# Patient Record
Sex: MALE | Race: WHITE | Employment: OTHER | URBAN - NONMETROPOLITAN AREA
[De-identification: names, ages, dates, MRNs, and addresses within clinical notes are randomized per-mention and may not be internally consistent; named-entity substitution may affect disease eponyms.]

---

## 2017-06-23 ENCOUNTER — OFFICE VISIT (OUTPATIENT)
Dept: CARDIOLOGY | Age: 62
End: 2017-06-23
Payer: COMMERCIAL

## 2017-06-23 VITALS
BODY MASS INDEX: 39.27 KG/M2 | SYSTOLIC BLOOD PRESSURE: 128 MMHG | DIASTOLIC BLOOD PRESSURE: 78 MMHG | WEIGHT: 306 LBS | HEART RATE: 70 BPM | HEIGHT: 74 IN

## 2017-06-23 DIAGNOSIS — I10 ESSENTIAL HYPERTENSION: ICD-10-CM

## 2017-06-23 DIAGNOSIS — I48.0 PAROXYSMAL ATRIAL FIBRILLATION (HCC): Primary | ICD-10-CM

## 2017-06-23 PROCEDURE — 93000 ELECTROCARDIOGRAM COMPLETE: CPT | Performed by: CLINICAL NURSE SPECIALIST

## 2017-06-23 PROCEDURE — 3017F COLORECTAL CA SCREEN DOC REV: CPT | Performed by: CLINICAL NURSE SPECIALIST

## 2017-06-23 PROCEDURE — G8427 DOCREV CUR MEDS BY ELIG CLIN: HCPCS | Performed by: CLINICAL NURSE SPECIALIST

## 2017-06-23 PROCEDURE — 1036F TOBACCO NON-USER: CPT | Performed by: CLINICAL NURSE SPECIALIST

## 2017-06-23 PROCEDURE — G8417 CALC BMI ABV UP PARAM F/U: HCPCS | Performed by: CLINICAL NURSE SPECIALIST

## 2017-06-23 PROCEDURE — 99213 OFFICE O/P EST LOW 20 MIN: CPT | Performed by: CLINICAL NURSE SPECIALIST

## 2017-08-04 ENCOUNTER — HOSPITAL ENCOUNTER (OUTPATIENT)
Dept: GENERAL RADIOLOGY | Age: 62
Discharge: HOME | End: 2017-08-04
Payer: COMMERCIAL

## 2017-08-04 DIAGNOSIS — J20.9 ACUTE BRONCHITIS, UNSPECIFIED ORGANISM: ICD-10-CM

## 2017-08-04 DIAGNOSIS — J01.10 ACUTE FRONTAL SINUSITIS, RECURRENCE NOT SPECIFIED: ICD-10-CM

## 2017-08-04 PROCEDURE — 71020 XR CHEST STANDARD TWO VW: CPT

## 2017-08-18 ENCOUNTER — HOSPITAL ENCOUNTER (OUTPATIENT)
Dept: CT IMAGING | Age: 62
Discharge: HOME | End: 2017-08-18
Payer: COMMERCIAL

## 2017-08-18 DIAGNOSIS — R06.2 WHEEZING: ICD-10-CM

## 2017-08-18 DIAGNOSIS — R05.9 COUGH: ICD-10-CM

## 2017-08-18 DIAGNOSIS — R07.89 OTHER CHEST PAIN: ICD-10-CM

## 2017-08-18 LAB
GFR NON-AFRICAN AMERICAN: >60
PERFORMED ON: NORMAL
POC CREATININE: 1.1 MG/DL (ref 0.3–1.3)
POC SAMPLE TYPE: NORMAL

## 2017-08-18 PROCEDURE — 71270 CT THORAX DX C-/C+: CPT

## 2017-08-18 PROCEDURE — 82565 ASSAY OF CREATININE: CPT

## 2017-08-18 PROCEDURE — 6360000004 HC RX CONTRAST MEDICATION: Performed by: FAMILY MEDICINE

## 2017-08-18 RX ADMIN — IOVERSOL 90 ML: 741 INJECTION INTRA-ARTERIAL; INTRAVENOUS at 09:58

## 2017-08-25 ENCOUNTER — HOSPITAL ENCOUNTER (OUTPATIENT)
Dept: PULMONOLOGY | Age: 62
Discharge: HOME | DRG: 193 | End: 2017-08-25
Payer: COMMERCIAL

## 2017-08-25 PROCEDURE — 94729 DIFFUSING CAPACITY: CPT

## 2017-08-25 PROCEDURE — 94727 GAS DIL/WSHOT DETER LNG VOL: CPT

## 2017-08-25 PROCEDURE — 94060 EVALUATION OF WHEEZING: CPT

## 2017-08-25 PROCEDURE — 6360000002 HC RX W HCPCS: Performed by: FAMILY MEDICINE

## 2017-08-25 RX ORDER — ALBUTEROL SULFATE 2.5 MG/3ML
2.5 SOLUTION RESPIRATORY (INHALATION) EVERY 6 HOURS PRN
Status: DISCONTINUED | OUTPATIENT
Start: 2017-08-25 | End: 2017-08-27 | Stop reason: HOSPADM

## 2017-08-27 ENCOUNTER — APPOINTMENT (OUTPATIENT)
Dept: GENERAL RADIOLOGY | Age: 62
DRG: 193 | End: 2017-08-27
Payer: COMMERCIAL

## 2017-08-27 ENCOUNTER — HOSPITAL ENCOUNTER (INPATIENT)
Age: 62
LOS: 8 days | Discharge: HOME | DRG: 193 | End: 2017-09-04
Attending: EMERGENCY MEDICINE | Admitting: FAMILY MEDICINE
Payer: COMMERCIAL

## 2017-08-27 DIAGNOSIS — R09.02 HYPOXIA: Primary | ICD-10-CM

## 2017-08-27 DIAGNOSIS — J45.20 REACTIVE AIRWAY DISEASE, MILD INTERMITTENT, UNCOMPLICATED: ICD-10-CM

## 2017-08-27 LAB
ALBUMIN SERPL-MCNC: 4.2 G/DL (ref 3.5–5.2)
ALP BLD-CCNC: 99 U/L (ref 40–130)
ALT SERPL-CCNC: 21 U/L (ref 5–41)
ANION GAP SERPL CALCULATED.3IONS-SCNC: 17 MMOL/L (ref 7–19)
AST SERPL-CCNC: 14 U/L (ref 5–40)
BASE EXCESS ARTERIAL: 0.8 MMOL/L (ref -2–2)
BILIRUB SERPL-MCNC: 1.3 MG/DL (ref 0.2–1.2)
BUN BLDV-MCNC: 14 MG/DL (ref 8–23)
CALCIUM SERPL-MCNC: 9.9 MG/DL (ref 8.8–10.2)
CARBOXYHEMOGLOBIN ARTERIAL: 1.3 % (ref 0–5)
CHLORIDE BLD-SCNC: 99 MMOL/L (ref 98–111)
CO2: 24 MMOL/L (ref 22–29)
CREAT SERPL-MCNC: 0.7 MG/DL (ref 0.5–1.2)
GFR NON-AFRICAN AMERICAN: >60
GLUCOSE BLD-MCNC: 180 MG/DL (ref 74–109)
GLUCOSE BLD-MCNC: 389 MG/DL (ref 70–99)
HCO3 ARTERIAL: 24.3 MMOL/L (ref 22–26)
HCT VFR BLD CALC: 49.8 % (ref 42–52)
HEMOGLOBIN, ART, EXTENDED: 16 G/DL (ref 14–18)
HEMOGLOBIN: 16.1 G/DL (ref 14–18)
MCH RBC QN AUTO: 26.9 PG (ref 27–31)
MCHC RBC AUTO-ENTMCNC: 32.3 G/DL (ref 33–37)
MCV RBC AUTO: 83.1 FL (ref 80–94)
METHEMOGLOBIN ARTERIAL: 0.6 %
O2 CONTENT ARTERIAL: 20.7 ML/DL
O2 SAT, ARTERIAL: 92.3 %
O2 THERAPY: ABNORMAL
PCO2 ARTERIAL: 35 MMHG (ref 35–45)
PDW BLD-RTO: 15.2 % (ref 11.5–14.5)
PERFORMED ON: ABNORMAL
PH ARTERIAL: 7.45 (ref 7.35–7.45)
PLATELET # BLD: 166 K/UL (ref 130–400)
PMV BLD AUTO: 11.9 FL (ref 9.4–12.4)
PO2 ARTERIAL: 64 MMHG (ref 80–100)
POTASSIUM SERPL-SCNC: 4.3 MMOL/L (ref 3.5–5)
POTASSIUM, WHOLE BLOOD: 4.1
RBC # BLD: 5.99 M/UL (ref 4.7–6.1)
SODIUM BLD-SCNC: 140 MMOL/L (ref 136–145)
TOTAL PROTEIN: 7.9 G/DL (ref 6.6–8.7)
WBC # BLD: 13.8 K/UL (ref 4.8–10.8)

## 2017-08-27 PROCEDURE — 82803 BLOOD GASES ANY COMBINATION: CPT

## 2017-08-27 PROCEDURE — 6360000002 HC RX W HCPCS: Performed by: EMERGENCY MEDICINE

## 2017-08-27 PROCEDURE — 1210000000 HC MED SURG R&B

## 2017-08-27 PROCEDURE — 99284 EMERGENCY DEPT VISIT MOD MDM: CPT | Performed by: EMERGENCY MEDICINE

## 2017-08-27 PROCEDURE — 36415 COLL VENOUS BLD VENIPUNCTURE: CPT

## 2017-08-27 PROCEDURE — 36600 WITHDRAWAL OF ARTERIAL BLOOD: CPT

## 2017-08-27 PROCEDURE — 82948 REAGENT STRIP/BLOOD GLUCOSE: CPT

## 2017-08-27 PROCEDURE — 84132 ASSAY OF SERUM POTASSIUM: CPT

## 2017-08-27 PROCEDURE — 93005 ELECTROCARDIOGRAM TRACING: CPT

## 2017-08-27 PROCEDURE — 6370000000 HC RX 637 (ALT 250 FOR IP): Performed by: EMERGENCY MEDICINE

## 2017-08-27 PROCEDURE — 85027 COMPLETE CBC AUTOMATED: CPT

## 2017-08-27 PROCEDURE — 99285 EMERGENCY DEPT VISIT HI MDM: CPT

## 2017-08-27 PROCEDURE — 6370000000 HC RX 637 (ALT 250 FOR IP): Performed by: FAMILY MEDICINE

## 2017-08-27 PROCEDURE — 71020 XR CHEST STANDARD TWO VW: CPT

## 2017-08-27 PROCEDURE — 2580000003 HC RX 258: Performed by: EMERGENCY MEDICINE

## 2017-08-27 PROCEDURE — 80053 COMPREHEN METABOLIC PANEL: CPT

## 2017-08-27 PROCEDURE — 94640 AIRWAY INHALATION TREATMENT: CPT

## 2017-08-27 PROCEDURE — 96374 THER/PROPH/DIAG INJ IV PUSH: CPT

## 2017-08-27 RX ORDER — METHYLPREDNISOLONE SODIUM SUCCINATE 125 MG/2ML
80 INJECTION, POWDER, LYOPHILIZED, FOR SOLUTION INTRAMUSCULAR; INTRAVENOUS EVERY 6 HOURS
Status: DISCONTINUED | OUTPATIENT
Start: 2017-08-27 | End: 2017-08-28

## 2017-08-27 RX ORDER — IPRATROPIUM BROMIDE AND ALBUTEROL SULFATE 2.5; .5 MG/3ML; MG/3ML
1 SOLUTION RESPIRATORY (INHALATION) ONCE
Status: COMPLETED | OUTPATIENT
Start: 2017-08-27 | End: 2017-08-27

## 2017-08-27 RX ORDER — M-VIT,TX,IRON,MINS/CALC/FOLIC 27MG-0.4MG
1 TABLET ORAL DAILY
Status: DISCONTINUED | OUTPATIENT
Start: 2017-08-27 | End: 2017-09-05 | Stop reason: HOSPADM

## 2017-08-27 RX ORDER — SODIUM CHLORIDE 0.9 % (FLUSH) 0.9 %
10 SYRINGE (ML) INJECTION EVERY 12 HOURS SCHEDULED
Status: DISCONTINUED | OUTPATIENT
Start: 2017-08-27 | End: 2017-09-05 | Stop reason: HOSPADM

## 2017-08-27 RX ORDER — ACETAMINOPHEN 325 MG/1
650 TABLET ORAL EVERY 4 HOURS PRN
Status: DISCONTINUED | OUTPATIENT
Start: 2017-08-27 | End: 2017-09-05 | Stop reason: HOSPADM

## 2017-08-27 RX ORDER — NICOTINE POLACRILEX 4 MG
15 LOZENGE BUCCAL PRN
Status: DISCONTINUED | OUTPATIENT
Start: 2017-08-27 | End: 2017-09-05 | Stop reason: HOSPADM

## 2017-08-27 RX ORDER — LISINOPRIL AND HYDROCHLOROTHIAZIDE 20; 12.5 MG/1; MG/1
1 TABLET ORAL DAILY
Status: DISCONTINUED | OUTPATIENT
Start: 2017-08-27 | End: 2017-08-27 | Stop reason: CLARIF

## 2017-08-27 RX ORDER — IPRATROPIUM BROMIDE AND ALBUTEROL SULFATE 2.5; .5 MG/3ML; MG/3ML
1 SOLUTION RESPIRATORY (INHALATION)
Status: DISCONTINUED | OUTPATIENT
Start: 2017-08-27 | End: 2017-08-27

## 2017-08-27 RX ORDER — ATORVASTATIN CALCIUM 40 MG/1
40 TABLET, FILM COATED ORAL DAILY
Status: DISCONTINUED | OUTPATIENT
Start: 2017-08-27 | End: 2017-09-05 | Stop reason: HOSPADM

## 2017-08-27 RX ORDER — ASPIRIN 81 MG/1
81 TABLET ORAL DAILY
Status: DISCONTINUED | OUTPATIENT
Start: 2017-08-27 | End: 2017-09-05 | Stop reason: HOSPADM

## 2017-08-27 RX ORDER — METHYLPREDNISOLONE SODIUM SUCCINATE 125 MG/2ML
125 INJECTION, POWDER, LYOPHILIZED, FOR SOLUTION INTRAMUSCULAR; INTRAVENOUS ONCE
Status: COMPLETED | OUTPATIENT
Start: 2017-08-27 | End: 2017-08-27

## 2017-08-27 RX ORDER — PANTOPRAZOLE SODIUM 40 MG/1
40 TABLET, DELAYED RELEASE ORAL
Status: DISCONTINUED | OUTPATIENT
Start: 2017-08-28 | End: 2017-09-05 | Stop reason: HOSPADM

## 2017-08-27 RX ORDER — HYDROCHLOROTHIAZIDE 12.5 MG/1
12.5 CAPSULE, GELATIN COATED ORAL DAILY
Status: DISCONTINUED | OUTPATIENT
Start: 2017-08-27 | End: 2017-08-28

## 2017-08-27 RX ORDER — LISINOPRIL 20 MG/1
20 TABLET ORAL DAILY
Status: DISCONTINUED | OUTPATIENT
Start: 2017-08-27 | End: 2017-08-28

## 2017-08-27 RX ORDER — PROPAFENONE HYDROCHLORIDE 150 MG/1
300 TABLET, FILM COATED ORAL 3 TIMES DAILY
Status: DISCONTINUED | OUTPATIENT
Start: 2017-08-27 | End: 2017-09-05 | Stop reason: HOSPADM

## 2017-08-27 RX ORDER — DEXTROSE MONOHYDRATE 50 MG/ML
100 INJECTION, SOLUTION INTRAVENOUS PRN
Status: DISCONTINUED | OUTPATIENT
Start: 2017-08-27 | End: 2017-09-05 | Stop reason: HOSPADM

## 2017-08-27 RX ORDER — ALBUTEROL SULFATE 2.5 MG/3ML
2.5 SOLUTION RESPIRATORY (INHALATION) EVERY 4 HOURS PRN
Status: DISCONTINUED | OUTPATIENT
Start: 2017-08-27 | End: 2017-08-28

## 2017-08-27 RX ORDER — DEXTROSE MONOHYDRATE 25 G/50ML
12.5 INJECTION, SOLUTION INTRAVENOUS PRN
Status: DISCONTINUED | OUTPATIENT
Start: 2017-08-27 | End: 2017-09-05 | Stop reason: HOSPADM

## 2017-08-27 RX ORDER — SODIUM CHLORIDE 0.9 % (FLUSH) 0.9 %
10 SYRINGE (ML) INJECTION PRN
Status: DISCONTINUED | OUTPATIENT
Start: 2017-08-27 | End: 2017-09-05 | Stop reason: HOSPADM

## 2017-08-27 RX ADMIN — IPRATROPIUM BROMIDE AND ALBUTEROL SULFATE 1 AMPULE: .5; 3 SOLUTION RESPIRATORY (INHALATION) at 15:06

## 2017-08-27 RX ADMIN — INSULIN LISPRO 5 UNITS: 100 INJECTION, SOLUTION INTRAVENOUS; SUBCUTANEOUS at 22:04

## 2017-08-27 RX ADMIN — ASPIRIN 81 MG: 81 TABLET, COATED ORAL at 20:08

## 2017-08-27 RX ADMIN — METFORMIN HYDROCHLORIDE 500 MG: 500 TABLET, FILM COATED ORAL at 20:08

## 2017-08-27 RX ADMIN — ATORVASTATIN CALCIUM 40 MG: 40 TABLET, FILM COATED ORAL at 20:08

## 2017-08-27 RX ADMIN — METHYLPREDNISOLONE SODIUM SUCCINATE 80 MG: 125 INJECTION, POWDER, FOR SOLUTION INTRAMUSCULAR; INTRAVENOUS at 20:09

## 2017-08-27 RX ADMIN — METHYLPREDNISOLONE SODIUM SUCCINATE 125 MG: 125 INJECTION, POWDER, FOR SOLUTION INTRAMUSCULAR; INTRAVENOUS at 15:45

## 2017-08-27 RX ADMIN — ENOXAPARIN SODIUM 40 MG: 40 INJECTION SUBCUTANEOUS at 20:08

## 2017-08-27 RX ADMIN — ACETAMINOPHEN 650 MG: 325 TABLET, FILM COATED ORAL at 20:17

## 2017-08-27 RX ADMIN — PROPAFENONE HYDROCHLORIDE 300 MG: 150 TABLET, FILM COATED ORAL at 20:08

## 2017-08-27 RX ADMIN — Medication 10 ML: at 20:08

## 2017-08-27 ASSESSMENT — PAIN SCALES - GENERAL
PAINLEVEL_OUTOF10: 5
PAINLEVEL_OUTOF10: 2
PAINLEVEL_OUTOF10: 2

## 2017-08-27 ASSESSMENT — ENCOUNTER SYMPTOMS
EYE PAIN: 0
APNEA: 0
ALLERGIC/IMMUNOLOGIC NEGATIVE: 1
CHEST TIGHTNESS: 0
EYE DISCHARGE: 0
BACK PAIN: 0
CONSTIPATION: 0
CHOKING: 0
GASTROINTESTINAL NEGATIVE: 1
RESPIRATORY NEGATIVE: 1
EYE REDNESS: 0
EYES NEGATIVE: 1
NAUSEA: 0
COUGH: 0
EYE ITCHING: 0
ABDOMINAL DISTENTION: 0

## 2017-08-28 ENCOUNTER — APPOINTMENT (OUTPATIENT)
Dept: CT IMAGING | Age: 62
DRG: 193 | End: 2017-08-28
Payer: COMMERCIAL

## 2017-08-28 PROBLEM — J96.01 ACUTE RESPIRATORY FAILURE WITH HYPOXIA (HCC): Status: ACTIVE | Noted: 2017-08-28

## 2017-08-28 PROBLEM — R09.02 HYPOXIA: Status: ACTIVE | Noted: 2017-08-28

## 2017-08-28 LAB
BILIRUBIN URINE: NEGATIVE
BLOOD, URINE: NEGATIVE
CLARITY: CLEAR
COLOR: YELLOW
D DIMER: 15.19 NG/ML DDU (ref 0–0.48)
GLUCOSE BLD-MCNC: 275 MG/DL (ref 70–99)
GLUCOSE BLD-MCNC: 304 MG/DL (ref 70–99)
GLUCOSE BLD-MCNC: 320 MG/DL (ref 70–99)
GLUCOSE BLD-MCNC: 321 MG/DL (ref 70–99)
GLUCOSE URINE: >=1000 MG/DL
KETONES, URINE: ABNORMAL MG/DL
LEUKOCYTE ESTERASE, URINE: NEGATIVE
NITRITE, URINE: NEGATIVE
PERFORMED ON: ABNORMAL
PH UA: 6
PROTEIN UA: NEGATIVE MG/DL
SPECIFIC GRAVITY UA: 1.04
STREP PNEUMONIAE ANTIGEN, URINE: NORMAL
UROBILINOGEN, URINE: 1 E.U./DL

## 2017-08-28 PROCEDURE — 6370000000 HC RX 637 (ALT 250 FOR IP): Performed by: FAMILY MEDICINE

## 2017-08-28 PROCEDURE — 1210000000 HC MED SURG R&B

## 2017-08-28 PROCEDURE — 6360000004 HC RX CONTRAST MEDICATION: Performed by: FAMILY MEDICINE

## 2017-08-28 PROCEDURE — 6360000002 HC RX W HCPCS: Performed by: FAMILY MEDICINE

## 2017-08-28 PROCEDURE — 6360000002 HC RX W HCPCS: Performed by: EMERGENCY MEDICINE

## 2017-08-28 PROCEDURE — 81003 URINALYSIS AUTO W/O SCOPE: CPT

## 2017-08-28 PROCEDURE — 82948 REAGENT STRIP/BLOOD GLUCOSE: CPT

## 2017-08-28 PROCEDURE — 2700000000 HC OXYGEN THERAPY PER DAY

## 2017-08-28 PROCEDURE — 2580000003 HC RX 258: Performed by: FAMILY MEDICINE

## 2017-08-28 PROCEDURE — 87449 NOS EACH ORGANISM AG IA: CPT

## 2017-08-28 PROCEDURE — 6370000000 HC RX 637 (ALT 250 FOR IP): Performed by: EMERGENCY MEDICINE

## 2017-08-28 PROCEDURE — 36415 COLL VENOUS BLD VENIPUNCTURE: CPT

## 2017-08-28 PROCEDURE — 6360000002 HC RX W HCPCS: Performed by: INTERNAL MEDICINE

## 2017-08-28 PROCEDURE — 2580000003 HC RX 258: Performed by: EMERGENCY MEDICINE

## 2017-08-28 PROCEDURE — 85379 FIBRIN DEGRADATION QUANT: CPT

## 2017-08-28 PROCEDURE — 94640 AIRWAY INHALATION TREATMENT: CPT

## 2017-08-28 PROCEDURE — 71275 CT ANGIOGRAPHY CHEST: CPT

## 2017-08-28 RX ORDER — LOSARTAN POTASSIUM 25 MG/1
50 TABLET ORAL DAILY
Status: DISCONTINUED | OUTPATIENT
Start: 2017-08-28 | End: 2017-09-05 | Stop reason: HOSPADM

## 2017-08-28 RX ORDER — ALBUTEROL SULFATE 2.5 MG/3ML
2.5 SOLUTION RESPIRATORY (INHALATION)
Status: DISCONTINUED | OUTPATIENT
Start: 2017-08-28 | End: 2017-09-05 | Stop reason: HOSPADM

## 2017-08-28 RX ORDER — HYDROCHLOROTHIAZIDE 12.5 MG/1
12.5 CAPSULE, GELATIN COATED ORAL DAILY
Status: DISCONTINUED | OUTPATIENT
Start: 2017-08-28 | End: 2017-09-05 | Stop reason: HOSPADM

## 2017-08-28 RX ORDER — METHYLPREDNISOLONE SODIUM SUCCINATE 40 MG/ML
40 INJECTION, POWDER, LYOPHILIZED, FOR SOLUTION INTRAMUSCULAR; INTRAVENOUS EVERY 6 HOURS
Status: DISCONTINUED | OUTPATIENT
Start: 2017-08-28 | End: 2017-08-29

## 2017-08-28 RX ORDER — LOSARTAN POTASSIUM AND HYDROCHLOROTHIAZIDE 12.5; 5 MG/1; MG/1
1 TABLET ORAL DAILY
Status: DISCONTINUED | OUTPATIENT
Start: 2017-08-28 | End: 2017-08-28 | Stop reason: SDUPTHER

## 2017-08-28 RX ADMIN — PANTOPRAZOLE SODIUM 40 MG: 40 TABLET, DELAYED RELEASE ORAL at 06:21

## 2017-08-28 RX ADMIN — Medication 10 ML: at 21:10

## 2017-08-28 RX ADMIN — HYDROCHLOROTHIAZIDE 12.5 MG: 12.5 CAPSULE ORAL at 08:19

## 2017-08-28 RX ADMIN — ENOXAPARIN SODIUM 40 MG: 40 INJECTION SUBCUTANEOUS at 17:27

## 2017-08-28 RX ADMIN — PROPAFENONE HYDROCHLORIDE 300 MG: 150 TABLET, FILM COATED ORAL at 13:58

## 2017-08-28 RX ADMIN — CEFTRIAXONE SODIUM 1 G: 1 INJECTION, POWDER, FOR SOLUTION INTRAMUSCULAR; INTRAVENOUS at 13:56

## 2017-08-28 RX ADMIN — MULTIPLE VITAMINS W/ MINERALS TAB 1 TABLET: TAB at 08:19

## 2017-08-28 RX ADMIN — INSULIN LISPRO 8 UNITS: 100 INJECTION, SOLUTION INTRAVENOUS; SUBCUTANEOUS at 12:21

## 2017-08-28 RX ADMIN — ASPIRIN 81 MG: 81 TABLET, COATED ORAL at 21:09

## 2017-08-28 RX ADMIN — METFORMIN HYDROCHLORIDE 500 MG: 500 TABLET, FILM COATED ORAL at 17:27

## 2017-08-28 RX ADMIN — METHYLPREDNISOLONE SODIUM SUCCINATE 80 MG: 125 INJECTION, POWDER, FOR SOLUTION INTRAMUSCULAR; INTRAVENOUS at 02:40

## 2017-08-28 RX ADMIN — ALBUTEROL SULFATE 2.5 MG: 2.5 SOLUTION RESPIRATORY (INHALATION) at 14:46

## 2017-08-28 RX ADMIN — ALBUTEROL SULFATE 2.5 MG: 2.5 SOLUTION RESPIRATORY (INHALATION) at 19:53

## 2017-08-28 RX ADMIN — LISINOPRIL 20 MG: 20 TABLET ORAL at 08:19

## 2017-08-28 RX ADMIN — IOVERSOL 90 ML: 741 INJECTION INTRA-ARTERIAL; INTRAVENOUS at 20:25

## 2017-08-28 RX ADMIN — METHYLPREDNISOLONE SODIUM SUCCINATE 80 MG: 125 INJECTION, POWDER, FOR SOLUTION INTRAMUSCULAR; INTRAVENOUS at 12:23

## 2017-08-28 RX ADMIN — METHYLPREDNISOLONE SODIUM SUCCINATE 80 MG: 125 INJECTION, POWDER, FOR SOLUTION INTRAMUSCULAR; INTRAVENOUS at 06:20

## 2017-08-28 RX ADMIN — PROPAFENONE HYDROCHLORIDE 300 MG: 150 TABLET, FILM COATED ORAL at 08:20

## 2017-08-28 RX ADMIN — ATORVASTATIN CALCIUM 40 MG: 40 TABLET, FILM COATED ORAL at 21:09

## 2017-08-28 RX ADMIN — INSULIN LISPRO 8 UNITS: 100 INJECTION, SOLUTION INTRAVENOUS; SUBCUTANEOUS at 17:27

## 2017-08-28 RX ADMIN — Medication 10 ML: at 08:23

## 2017-08-28 RX ADMIN — INSULIN LISPRO 3 UNITS: 100 INJECTION, SOLUTION INTRAVENOUS; SUBCUTANEOUS at 21:10

## 2017-08-28 RX ADMIN — METFORMIN HYDROCHLORIDE 500 MG: 500 TABLET, FILM COATED ORAL at 08:19

## 2017-08-28 RX ADMIN — INSULIN LISPRO 8 UNITS: 100 INJECTION, SOLUTION INTRAVENOUS; SUBCUTANEOUS at 08:23

## 2017-08-28 RX ADMIN — METHYLPREDNISOLONE SODIUM SUCCINATE 40 MG: 40 INJECTION, POWDER, FOR SOLUTION INTRAMUSCULAR; INTRAVENOUS at 21:10

## 2017-08-28 RX ADMIN — PROPAFENONE HYDROCHLORIDE 300 MG: 150 TABLET, FILM COATED ORAL at 21:09

## 2017-08-28 ASSESSMENT — ENCOUNTER SYMPTOMS
NAUSEA: 0
BLURRED VISION: 0
HEMOPTYSIS: 0
VOMITING: 0
ORTHOPNEA: 0
SORE THROAT: 0
BACK PAIN: 0
STRIDOR: 0
ABDOMINAL PAIN: 0

## 2017-08-29 PROBLEM — J18.9 BILATERAL PNEUMONIA: Status: ACTIVE | Noted: 2017-08-29

## 2017-08-29 LAB
ALBUMIN SERPL-MCNC: 3.5 G/DL (ref 3.5–5.2)
ALP BLD-CCNC: 83 U/L (ref 40–130)
ALT SERPL-CCNC: 13 U/L (ref 5–41)
ANION GAP SERPL CALCULATED.3IONS-SCNC: 15 MMOL/L (ref 7–19)
AST SERPL-CCNC: 9 U/L (ref 5–40)
BASOPHILS ABSOLUTE: 0 K/UL (ref 0–0.2)
BASOPHILS RELATIVE PERCENT: 0.1 % (ref 0–1)
BILIRUB SERPL-MCNC: 0.3 MG/DL (ref 0.2–1.2)
BUN BLDV-MCNC: 22 MG/DL (ref 8–23)
CALCIUM SERPL-MCNC: 9.1 MG/DL (ref 8.8–10.2)
CHLORIDE BLD-SCNC: 98 MMOL/L (ref 98–111)
CO2: 24 MMOL/L (ref 22–29)
CREAT SERPL-MCNC: 0.8 MG/DL (ref 0.5–1.2)
CULTURE, RESPIRATORY: ABNORMAL
EOSINOPHILS ABSOLUTE: 0 K/UL (ref 0–0.6)
EOSINOPHILS RELATIVE PERCENT: 0 % (ref 0–5)
GFR NON-AFRICAN AMERICAN: >60
GLUCOSE BLD-MCNC: 228 MG/DL (ref 70–99)
GLUCOSE BLD-MCNC: 249 MG/DL (ref 74–109)
GLUCOSE BLD-MCNC: 258 MG/DL (ref 70–99)
GLUCOSE BLD-MCNC: 293 MG/DL (ref 70–99)
GLUCOSE BLD-MCNC: 386 MG/DL (ref 70–99)
GRAM STAIN RESULT: ABNORMAL
HCT VFR BLD CALC: 41.6 % (ref 42–52)
HEMOGLOBIN: 13.6 G/DL (ref 14–18)
LYMPHOCYTES ABSOLUTE: 2.1 K/UL (ref 1.1–4.5)
LYMPHOCYTES RELATIVE PERCENT: 11.5 % (ref 20–40)
MCH RBC QN AUTO: 27.1 PG (ref 27–31)
MCHC RBC AUTO-ENTMCNC: 32.7 G/DL (ref 33–37)
MCV RBC AUTO: 82.9 FL (ref 80–94)
MONOCYTES ABSOLUTE: 1 K/UL (ref 0–0.9)
MONOCYTES RELATIVE PERCENT: 5.5 % (ref 0–10)
NEUTROPHILS ABSOLUTE: 15.3 K/UL (ref 1.5–7.5)
NEUTROPHILS RELATIVE PERCENT: 81.8 % (ref 50–65)
ORGANISM: ABNORMAL
PDW BLD-RTO: 14.5 % (ref 11.5–14.5)
PERFORMED ON: ABNORMAL
PLATELET # BLD: 187 K/UL (ref 130–400)
PMV BLD AUTO: 11.8 FL (ref 9.4–12.4)
POTASSIUM SERPL-SCNC: 4.6 MMOL/L (ref 3.5–5)
RBC # BLD: 5.02 M/UL (ref 4.7–6.1)
SODIUM BLD-SCNC: 137 MMOL/L (ref 136–145)
TOTAL PROTEIN: 6.7 G/DL (ref 6.6–8.7)
WBC # BLD: 18.7 K/UL (ref 4.8–10.8)

## 2017-08-29 PROCEDURE — 6360000002 HC RX W HCPCS: Performed by: INTERNAL MEDICINE

## 2017-08-29 PROCEDURE — 6370000000 HC RX 637 (ALT 250 FOR IP): Performed by: FAMILY MEDICINE

## 2017-08-29 PROCEDURE — 94664 DEMO&/EVAL PT USE INHALER: CPT

## 2017-08-29 PROCEDURE — 6370000000 HC RX 637 (ALT 250 FOR IP): Performed by: EMERGENCY MEDICINE

## 2017-08-29 PROCEDURE — 36415 COLL VENOUS BLD VENIPUNCTURE: CPT

## 2017-08-29 PROCEDURE — 82948 REAGENT STRIP/BLOOD GLUCOSE: CPT

## 2017-08-29 PROCEDURE — 80053 COMPREHEN METABOLIC PANEL: CPT

## 2017-08-29 PROCEDURE — 2580000003 HC RX 258: Performed by: FAMILY MEDICINE

## 2017-08-29 PROCEDURE — 6360000002 HC RX W HCPCS: Performed by: FAMILY MEDICINE

## 2017-08-29 PROCEDURE — 6370000000 HC RX 637 (ALT 250 FOR IP): Performed by: INTERNAL MEDICINE

## 2017-08-29 PROCEDURE — 94640 AIRWAY INHALATION TREATMENT: CPT

## 2017-08-29 PROCEDURE — 2580000003 HC RX 258: Performed by: EMERGENCY MEDICINE

## 2017-08-29 PROCEDURE — 6360000002 HC RX W HCPCS: Performed by: EMERGENCY MEDICINE

## 2017-08-29 PROCEDURE — 2700000000 HC OXYGEN THERAPY PER DAY

## 2017-08-29 PROCEDURE — 1210000000 HC MED SURG R&B

## 2017-08-29 PROCEDURE — 94762 N-INVAS EAR/PLS OXIMTRY CONT: CPT

## 2017-08-29 PROCEDURE — 85025 COMPLETE CBC W/AUTO DIFF WBC: CPT

## 2017-08-29 RX ORDER — METHYLPREDNISOLONE SODIUM SUCCINATE 40 MG/ML
40 INJECTION, POWDER, LYOPHILIZED, FOR SOLUTION INTRAMUSCULAR; INTRAVENOUS EVERY 12 HOURS
Status: DISCONTINUED | OUTPATIENT
Start: 2017-08-29 | End: 2017-08-30

## 2017-08-29 RX ORDER — BENZONATATE 100 MG/1
100 CAPSULE ORAL 3 TIMES DAILY
Status: DISCONTINUED | OUTPATIENT
Start: 2017-08-29 | End: 2017-09-05 | Stop reason: HOSPADM

## 2017-08-29 RX ORDER — ZOLPIDEM TARTRATE 5 MG/1
5 TABLET ORAL NIGHTLY PRN
Status: DISCONTINUED | OUTPATIENT
Start: 2017-08-29 | End: 2017-09-05 | Stop reason: HOSPADM

## 2017-08-29 RX ADMIN — INSULIN LISPRO 6 UNITS: 100 INJECTION, SOLUTION INTRAVENOUS; SUBCUTANEOUS at 17:11

## 2017-08-29 RX ADMIN — ALBUTEROL SULFATE 2.5 MG: 2.5 SOLUTION RESPIRATORY (INHALATION) at 00:40

## 2017-08-29 RX ADMIN — ASPIRIN 81 MG: 81 TABLET, COATED ORAL at 20:42

## 2017-08-29 RX ADMIN — INSULIN LISPRO 6 UNITS: 100 INJECTION, SOLUTION INTRAVENOUS; SUBCUTANEOUS at 08:07

## 2017-08-29 RX ADMIN — Medication 10 ML: at 20:43

## 2017-08-29 RX ADMIN — ALBUTEROL SULFATE 2.5 MG: 2.5 SOLUTION RESPIRATORY (INHALATION) at 22:33

## 2017-08-29 RX ADMIN — PROPAFENONE HYDROCHLORIDE 300 MG: 150 TABLET, FILM COATED ORAL at 14:29

## 2017-08-29 RX ADMIN — METHYLPREDNISOLONE SODIUM SUCCINATE 40 MG: 40 INJECTION, POWDER, FOR SOLUTION INTRAMUSCULAR; INTRAVENOUS at 04:56

## 2017-08-29 RX ADMIN — GUAIFENESIN AND DEXTROMETHORPHAN HYDROBROMIDE 1 TABLET: 600; 30 TABLET, EXTENDED RELEASE ORAL at 10:05

## 2017-08-29 RX ADMIN — MULTIPLE VITAMINS W/ MINERALS TAB 1 TABLET: TAB at 08:06

## 2017-08-29 RX ADMIN — INSULIN LISPRO 10 UNITS: 100 INJECTION, SOLUTION INTRAVENOUS; SUBCUTANEOUS at 11:50

## 2017-08-29 RX ADMIN — ATORVASTATIN CALCIUM 40 MG: 40 TABLET, FILM COATED ORAL at 20:42

## 2017-08-29 RX ADMIN — CEFTRIAXONE SODIUM 1 G: 1 INJECTION, POWDER, FOR SOLUTION INTRAMUSCULAR; INTRAVENOUS at 14:29

## 2017-08-29 RX ADMIN — PROPAFENONE HYDROCHLORIDE 300 MG: 150 TABLET, FILM COATED ORAL at 20:42

## 2017-08-29 RX ADMIN — METHYLPREDNISOLONE SODIUM SUCCINATE 40 MG: 40 INJECTION, POWDER, FOR SOLUTION INTRAMUSCULAR; INTRAVENOUS at 17:10

## 2017-08-29 RX ADMIN — HYDROCHLOROTHIAZIDE 12.5 MG: 12.5 CAPSULE ORAL at 08:06

## 2017-08-29 RX ADMIN — INSULIN LISPRO 2 UNITS: 100 INJECTION, SOLUTION INTRAVENOUS; SUBCUTANEOUS at 20:51

## 2017-08-29 RX ADMIN — ALBUTEROL SULFATE 2.5 MG: 2.5 SOLUTION RESPIRATORY (INHALATION) at 14:29

## 2017-08-29 RX ADMIN — ZOLPIDEM TARTRATE 5 MG: 5 TABLET ORAL at 23:10

## 2017-08-29 RX ADMIN — LOSARTAN POTASSIUM 50 MG: 25 TABLET ORAL at 08:06

## 2017-08-29 RX ADMIN — GUAIFENESIN AND DEXTROMETHORPHAN HYDROBROMIDE 1 TABLET: 600; 30 TABLET, EXTENDED RELEASE ORAL at 20:42

## 2017-08-29 RX ADMIN — ENOXAPARIN SODIUM 40 MG: 40 INJECTION SUBCUTANEOUS at 17:10

## 2017-08-29 RX ADMIN — PROPAFENONE HYDROCHLORIDE 300 MG: 150 TABLET, FILM COATED ORAL at 08:06

## 2017-08-29 RX ADMIN — BENZONATATE 100 MG: 100 CAPSULE ORAL at 10:06

## 2017-08-29 RX ADMIN — Medication 10 ML: at 08:08

## 2017-08-29 RX ADMIN — PANTOPRAZOLE SODIUM 40 MG: 40 TABLET, DELAYED RELEASE ORAL at 06:12

## 2017-08-29 RX ADMIN — ALBUTEROL SULFATE 2.5 MG: 2.5 SOLUTION RESPIRATORY (INHALATION) at 10:19

## 2017-08-29 RX ADMIN — BENZONATATE 100 MG: 100 CAPSULE ORAL at 20:42

## 2017-08-29 RX ADMIN — BENZONATATE 100 MG: 100 CAPSULE ORAL at 14:29

## 2017-08-29 RX ADMIN — ALBUTEROL SULFATE 2.5 MG: 2.5 SOLUTION RESPIRATORY (INHALATION) at 06:26

## 2017-08-29 ASSESSMENT — ENCOUNTER SYMPTOMS
ORTHOPNEA: 0
VOMITING: 0
HEMOPTYSIS: 0

## 2017-08-30 LAB
ALBUMIN SERPL-MCNC: 3.4 G/DL (ref 3.5–5.2)
ALP BLD-CCNC: 76 U/L (ref 40–130)
ALT SERPL-CCNC: 23 U/L (ref 5–41)
ANION GAP SERPL CALCULATED.3IONS-SCNC: 13 MMOL/L (ref 7–19)
AST SERPL-CCNC: 27 U/L (ref 5–40)
BASOPHILS ABSOLUTE: 0 K/UL (ref 0–0.2)
BASOPHILS RELATIVE PERCENT: 0.1 % (ref 0–1)
BILIRUB SERPL-MCNC: 0.3 MG/DL (ref 0.2–1.2)
BUN BLDV-MCNC: 19 MG/DL (ref 8–23)
CALCIUM SERPL-MCNC: 8.9 MG/DL (ref 8.8–10.2)
CHLORIDE BLD-SCNC: 99 MMOL/L (ref 98–111)
CO2: 29 MMOL/L (ref 22–29)
CREAT SERPL-MCNC: 0.9 MG/DL (ref 0.5–1.2)
EOSINOPHILS ABSOLUTE: 0 K/UL (ref 0–0.6)
EOSINOPHILS RELATIVE PERCENT: 0.1 % (ref 0–5)
GFR NON-AFRICAN AMERICAN: >60
GLUCOSE BLD-MCNC: 233 MG/DL (ref 74–109)
GLUCOSE BLD-MCNC: 255 MG/DL (ref 70–99)
GLUCOSE BLD-MCNC: 257 MG/DL (ref 70–99)
GLUCOSE BLD-MCNC: 263 MG/DL (ref 70–99)
HCT VFR BLD CALC: 41.9 % (ref 42–52)
HEMOGLOBIN: 13.4 G/DL (ref 14–18)
LYMPHOCYTES ABSOLUTE: 2.7 K/UL (ref 1.1–4.5)
LYMPHOCYTES RELATIVE PERCENT: 19.8 % (ref 20–40)
MCH RBC QN AUTO: 26.9 PG (ref 27–31)
MCHC RBC AUTO-ENTMCNC: 32 G/DL (ref 33–37)
MCV RBC AUTO: 84 FL (ref 80–94)
MONOCYTES ABSOLUTE: 0.9 K/UL (ref 0–0.9)
MONOCYTES RELATIVE PERCENT: 6.6 % (ref 0–10)
NEUTROPHILS ABSOLUTE: 9.8 K/UL (ref 1.5–7.5)
NEUTROPHILS RELATIVE PERCENT: 72.2 % (ref 50–65)
PDW BLD-RTO: 14.6 % (ref 11.5–14.5)
PERFORMED ON: ABNORMAL
PLATELET # BLD: 166 K/UL (ref 130–400)
PMV BLD AUTO: 11.9 FL (ref 9.4–12.4)
POTASSIUM SERPL-SCNC: 4.3 MMOL/L (ref 3.5–5)
RBC # BLD: 4.99 M/UL (ref 4.7–6.1)
SODIUM BLD-SCNC: 141 MMOL/L (ref 136–145)
TOTAL PROTEIN: 6.2 G/DL (ref 6.6–8.7)
WBC # BLD: 13.6 K/UL (ref 4.8–10.8)

## 2017-08-30 PROCEDURE — 6370000000 HC RX 637 (ALT 250 FOR IP): Performed by: EMERGENCY MEDICINE

## 2017-08-30 PROCEDURE — 2580000003 HC RX 258: Performed by: FAMILY MEDICINE

## 2017-08-30 PROCEDURE — 94640 AIRWAY INHALATION TREATMENT: CPT

## 2017-08-30 PROCEDURE — 6370000000 HC RX 637 (ALT 250 FOR IP): Performed by: FAMILY MEDICINE

## 2017-08-30 PROCEDURE — 85025 COMPLETE CBC W/AUTO DIFF WBC: CPT

## 2017-08-30 PROCEDURE — 6360000002 HC RX W HCPCS: Performed by: EMERGENCY MEDICINE

## 2017-08-30 PROCEDURE — 2580000003 HC RX 258: Performed by: EMERGENCY MEDICINE

## 2017-08-30 PROCEDURE — 6360000002 HC RX W HCPCS: Performed by: INTERNAL MEDICINE

## 2017-08-30 PROCEDURE — 6360000002 HC RX W HCPCS: Performed by: FAMILY MEDICINE

## 2017-08-30 PROCEDURE — 1210000000 HC MED SURG R&B

## 2017-08-30 PROCEDURE — 2700000000 HC OXYGEN THERAPY PER DAY

## 2017-08-30 PROCEDURE — 36415 COLL VENOUS BLD VENIPUNCTURE: CPT

## 2017-08-30 PROCEDURE — 82948 REAGENT STRIP/BLOOD GLUCOSE: CPT

## 2017-08-30 PROCEDURE — 6370000000 HC RX 637 (ALT 250 FOR IP): Performed by: INTERNAL MEDICINE

## 2017-08-30 PROCEDURE — 80053 COMPREHEN METABOLIC PANEL: CPT

## 2017-08-30 PROCEDURE — 6360000002 HC RX W HCPCS: Performed by: NURSE PRACTITIONER

## 2017-08-30 RX ORDER — METHYLPREDNISOLONE SODIUM SUCCINATE 40 MG/ML
20 INJECTION, POWDER, LYOPHILIZED, FOR SOLUTION INTRAMUSCULAR; INTRAVENOUS EVERY 12 HOURS
Status: DISCONTINUED | OUTPATIENT
Start: 2017-08-30 | End: 2017-09-01

## 2017-08-30 RX ORDER — SODIUM CHLORIDE/ALOE VERA
GEL (GRAM) NASAL PRN
Status: DISCONTINUED | OUTPATIENT
Start: 2017-08-30 | End: 2017-09-05 | Stop reason: HOSPADM

## 2017-08-30 RX ADMIN — PROPAFENONE HYDROCHLORIDE 300 MG: 150 TABLET, FILM COATED ORAL at 20:16

## 2017-08-30 RX ADMIN — PANTOPRAZOLE SODIUM 40 MG: 40 TABLET, DELAYED RELEASE ORAL at 06:25

## 2017-08-30 RX ADMIN — LOSARTAN POTASSIUM 50 MG: 25 TABLET ORAL at 09:55

## 2017-08-30 RX ADMIN — MULTIPLE VITAMINS W/ MINERALS TAB 1 TABLET: TAB at 09:55

## 2017-08-30 RX ADMIN — METHYLPREDNISOLONE SODIUM SUCCINATE 40 MG: 40 INJECTION, POWDER, FOR SOLUTION INTRAMUSCULAR; INTRAVENOUS at 06:26

## 2017-08-30 RX ADMIN — ALBUTEROL SULFATE 2.5 MG: 2.5 SOLUTION RESPIRATORY (INHALATION) at 19:15

## 2017-08-30 RX ADMIN — HYDROCHLOROTHIAZIDE 12.5 MG: 12.5 CAPSULE ORAL at 09:55

## 2017-08-30 RX ADMIN — BENZONATATE 100 MG: 100 CAPSULE ORAL at 13:53

## 2017-08-30 RX ADMIN — ALBUTEROL SULFATE 2.5 MG: 2.5 SOLUTION RESPIRATORY (INHALATION) at 10:09

## 2017-08-30 RX ADMIN — Medication 10 ML: at 20:16

## 2017-08-30 RX ADMIN — INSULIN LISPRO 3 UNITS: 100 INJECTION, SOLUTION INTRAVENOUS; SUBCUTANEOUS at 20:59

## 2017-08-30 RX ADMIN — INSULIN LISPRO 6 UNITS: 100 INJECTION, SOLUTION INTRAVENOUS; SUBCUTANEOUS at 13:46

## 2017-08-30 RX ADMIN — BENZONATATE 100 MG: 100 CAPSULE ORAL at 20:15

## 2017-08-30 RX ADMIN — BENZONATATE 100 MG: 100 CAPSULE ORAL at 09:55

## 2017-08-30 RX ADMIN — PROPAFENONE HYDROCHLORIDE 300 MG: 150 TABLET, FILM COATED ORAL at 09:55

## 2017-08-30 RX ADMIN — METHYLPREDNISOLONE SODIUM SUCCINATE 20 MG: 40 INJECTION, POWDER, FOR SOLUTION INTRAMUSCULAR; INTRAVENOUS at 18:27

## 2017-08-30 RX ADMIN — ENOXAPARIN SODIUM 40 MG: 40 INJECTION SUBCUTANEOUS at 18:27

## 2017-08-30 RX ADMIN — ALBUTEROL SULFATE 2.5 MG: 2.5 SOLUTION RESPIRATORY (INHALATION) at 22:30

## 2017-08-30 RX ADMIN — ALBUTEROL SULFATE 2.5 MG: 2.5 SOLUTION RESPIRATORY (INHALATION) at 06:17

## 2017-08-30 RX ADMIN — ATORVASTATIN CALCIUM 40 MG: 40 TABLET, FILM COATED ORAL at 20:15

## 2017-08-30 RX ADMIN — ALBUTEROL SULFATE 2.5 MG: 2.5 SOLUTION RESPIRATORY (INHALATION) at 14:53

## 2017-08-30 RX ADMIN — PROPAFENONE HYDROCHLORIDE 300 MG: 150 TABLET, FILM COATED ORAL at 13:53

## 2017-08-30 RX ADMIN — ASPIRIN 81 MG: 81 TABLET, COATED ORAL at 20:15

## 2017-08-30 RX ADMIN — INSULIN LISPRO 6 UNITS: 100 INJECTION, SOLUTION INTRAVENOUS; SUBCUTANEOUS at 18:30

## 2017-08-30 RX ADMIN — CEFTRIAXONE SODIUM 1 G: 1 INJECTION, POWDER, FOR SOLUTION INTRAMUSCULAR; INTRAVENOUS at 13:41

## 2017-08-30 ASSESSMENT — ENCOUNTER SYMPTOMS
VOMITING: 0
ORTHOPNEA: 0
HEMOPTYSIS: 0

## 2017-08-31 LAB
ANION GAP SERPL CALCULATED.3IONS-SCNC: 15 MMOL/L (ref 7–19)
BASOPHILS ABSOLUTE: 0 K/UL (ref 0–0.2)
BASOPHILS RELATIVE PERCENT: 0.2 % (ref 0–1)
BUN BLDV-MCNC: 18 MG/DL (ref 8–23)
CALCIUM SERPL-MCNC: 9.5 MG/DL (ref 8.8–10.2)
CHLORIDE BLD-SCNC: 97 MMOL/L (ref 98–111)
CO2: 26 MMOL/L (ref 22–29)
CREAT SERPL-MCNC: 0.8 MG/DL (ref 0.5–1.2)
EOSINOPHILS ABSOLUTE: 0 K/UL (ref 0–0.6)
EOSINOPHILS RELATIVE PERCENT: 0.2 % (ref 0–5)
GFR NON-AFRICAN AMERICAN: >60
GLUCOSE BLD-MCNC: 214 MG/DL (ref 70–99)
GLUCOSE BLD-MCNC: 221 MG/DL (ref 70–99)
GLUCOSE BLD-MCNC: 224 MG/DL (ref 70–99)
GLUCOSE BLD-MCNC: 244 MG/DL (ref 74–109)
GLUCOSE BLD-MCNC: 275 MG/DL (ref 70–99)
HCT VFR BLD CALC: 44 % (ref 42–52)
HEMOGLOBIN: 14.3 G/DL (ref 14–18)
L. PNEUMOPHILA SEROGP 1 UR AG: NEGATIVE
LYMPHOCYTES ABSOLUTE: 3.3 K/UL (ref 1.1–4.5)
LYMPHOCYTES RELATIVE PERCENT: 21.9 % (ref 20–40)
MCH RBC QN AUTO: 27.4 PG (ref 27–31)
MCHC RBC AUTO-ENTMCNC: 32.5 G/DL (ref 33–37)
MCV RBC AUTO: 84.5 FL (ref 80–94)
MONOCYTES ABSOLUTE: 1.1 K/UL (ref 0–0.9)
MONOCYTES RELATIVE PERCENT: 7.1 % (ref 0–10)
NEUTROPHILS ABSOLUTE: 10.3 K/UL (ref 1.5–7.5)
NEUTROPHILS RELATIVE PERCENT: 68 % (ref 50–65)
PDW BLD-RTO: 14.6 % (ref 11.5–14.5)
PERFORMED ON: ABNORMAL
PLATELET # BLD: 185 K/UL (ref 130–400)
PMV BLD AUTO: 11.4 FL (ref 9.4–12.4)
POTASSIUM SERPL-SCNC: 4.5 MMOL/L (ref 3.5–5)
RBC # BLD: 5.21 M/UL (ref 4.7–6.1)
SODIUM BLD-SCNC: 138 MMOL/L (ref 136–145)
WBC # BLD: 15.2 K/UL (ref 4.8–10.8)

## 2017-08-31 PROCEDURE — 6370000000 HC RX 637 (ALT 250 FOR IP): Performed by: EMERGENCY MEDICINE

## 2017-08-31 PROCEDURE — 2580000003 HC RX 258: Performed by: EMERGENCY MEDICINE

## 2017-08-31 PROCEDURE — 94640 AIRWAY INHALATION TREATMENT: CPT

## 2017-08-31 PROCEDURE — 6370000000 HC RX 637 (ALT 250 FOR IP): Performed by: INTERNAL MEDICINE

## 2017-08-31 PROCEDURE — 85025 COMPLETE CBC W/AUTO DIFF WBC: CPT

## 2017-08-31 PROCEDURE — 6360000002 HC RX W HCPCS: Performed by: NURSE PRACTITIONER

## 2017-08-31 PROCEDURE — 36415 COLL VENOUS BLD VENIPUNCTURE: CPT

## 2017-08-31 PROCEDURE — 6370000000 HC RX 637 (ALT 250 FOR IP): Performed by: FAMILY MEDICINE

## 2017-08-31 PROCEDURE — 6360000002 HC RX W HCPCS: Performed by: FAMILY MEDICINE

## 2017-08-31 PROCEDURE — 6360000002 HC RX W HCPCS: Performed by: EMERGENCY MEDICINE

## 2017-08-31 PROCEDURE — 2580000003 HC RX 258: Performed by: FAMILY MEDICINE

## 2017-08-31 PROCEDURE — 1210000000 HC MED SURG R&B

## 2017-08-31 PROCEDURE — 94667 MNPJ CHEST WALL 1ST: CPT

## 2017-08-31 PROCEDURE — 82948 REAGENT STRIP/BLOOD GLUCOSE: CPT

## 2017-08-31 PROCEDURE — 80048 BASIC METABOLIC PNL TOTAL CA: CPT

## 2017-08-31 RX ORDER — HYDROCODONE POLISTIREX AND CHLORPHENIRAMINE POLISTIREX 10; 8 MG/5ML; MG/5ML
5 SUSPENSION, EXTENDED RELEASE ORAL EVERY 12 HOURS PRN
Status: DISCONTINUED | OUTPATIENT
Start: 2017-08-31 | End: 2017-09-05 | Stop reason: HOSPADM

## 2017-08-31 RX ADMIN — ALBUTEROL SULFATE 2.5 MG: 2.5 SOLUTION RESPIRATORY (INHALATION) at 09:53

## 2017-08-31 RX ADMIN — BENZONATATE 100 MG: 100 CAPSULE ORAL at 08:30

## 2017-08-31 RX ADMIN — ATORVASTATIN CALCIUM 40 MG: 40 TABLET, FILM COATED ORAL at 20:52

## 2017-08-31 RX ADMIN — MULTIPLE VITAMINS W/ MINERALS TAB 1 TABLET: TAB at 08:29

## 2017-08-31 RX ADMIN — METHYLPREDNISOLONE SODIUM SUCCINATE 20 MG: 40 INJECTION, POWDER, FOR SOLUTION INTRAMUSCULAR; INTRAVENOUS at 04:07

## 2017-08-31 RX ADMIN — LOSARTAN POTASSIUM 50 MG: 25 TABLET ORAL at 08:30

## 2017-08-31 RX ADMIN — ALBUTEROL SULFATE 2.5 MG: 2.5 SOLUTION RESPIRATORY (INHALATION) at 18:30

## 2017-08-31 RX ADMIN — BENZONATATE 100 MG: 100 CAPSULE ORAL at 14:42

## 2017-08-31 RX ADMIN — PROPAFENONE HYDROCHLORIDE 300 MG: 150 TABLET, FILM COATED ORAL at 08:30

## 2017-08-31 RX ADMIN — PROPAFENONE HYDROCHLORIDE 300 MG: 150 TABLET, FILM COATED ORAL at 14:42

## 2017-08-31 RX ADMIN — ALBUTEROL SULFATE 2.5 MG: 2.5 SOLUTION RESPIRATORY (INHALATION) at 22:23

## 2017-08-31 RX ADMIN — PANTOPRAZOLE SODIUM 40 MG: 40 TABLET, DELAYED RELEASE ORAL at 05:25

## 2017-08-31 RX ADMIN — BENZONATATE 100 MG: 100 CAPSULE ORAL at 20:52

## 2017-08-31 RX ADMIN — INSULIN LISPRO 4 UNITS: 100 INJECTION, SOLUTION INTRAVENOUS; SUBCUTANEOUS at 12:23

## 2017-08-31 RX ADMIN — METFORMIN HYDROCHLORIDE 500 MG: 500 TABLET, FILM COATED ORAL at 08:29

## 2017-08-31 RX ADMIN — Medication 10 ML: at 10:03

## 2017-08-31 RX ADMIN — INSULIN LISPRO 4 UNITS: 100 INJECTION, SOLUTION INTRAVENOUS; SUBCUTANEOUS at 16:06

## 2017-08-31 RX ADMIN — INSULIN LISPRO 3 UNITS: 100 INJECTION, SOLUTION INTRAVENOUS; SUBCUTANEOUS at 21:16

## 2017-08-31 RX ADMIN — Medication 10 ML: at 20:52

## 2017-08-31 RX ADMIN — ALBUTEROL SULFATE 2.5 MG: 2.5 SOLUTION RESPIRATORY (INHALATION) at 06:35

## 2017-08-31 RX ADMIN — PROPAFENONE HYDROCHLORIDE 300 MG: 150 TABLET, FILM COATED ORAL at 20:52

## 2017-08-31 RX ADMIN — Medication 5 ML: at 09:21

## 2017-08-31 RX ADMIN — ENOXAPARIN SODIUM 40 MG: 40 INJECTION SUBCUTANEOUS at 17:42

## 2017-08-31 RX ADMIN — METHYLPREDNISOLONE SODIUM SUCCINATE 20 MG: 40 INJECTION, POWDER, FOR SOLUTION INTRAMUSCULAR; INTRAVENOUS at 16:06

## 2017-08-31 RX ADMIN — HYDROCHLOROTHIAZIDE 12.5 MG: 12.5 CAPSULE ORAL at 08:30

## 2017-08-31 RX ADMIN — ALBUTEROL SULFATE 2.5 MG: 2.5 SOLUTION RESPIRATORY (INHALATION) at 14:35

## 2017-08-31 RX ADMIN — INSULIN LISPRO 4 UNITS: 100 INJECTION, SOLUTION INTRAVENOUS; SUBCUTANEOUS at 08:32

## 2017-08-31 RX ADMIN — METFORMIN HYDROCHLORIDE 500 MG: 500 TABLET, FILM COATED ORAL at 16:06

## 2017-08-31 RX ADMIN — ASPIRIN 81 MG: 81 TABLET, COATED ORAL at 20:52

## 2017-08-31 RX ADMIN — CEFTRIAXONE SODIUM 1 G: 1 INJECTION, POWDER, FOR SOLUTION INTRAMUSCULAR; INTRAVENOUS at 14:42

## 2017-08-31 ASSESSMENT — ENCOUNTER SYMPTOMS
ABDOMINAL PAIN: 0
BLURRED VISION: 0
SPUTUM PRODUCTION: 0
NAUSEA: 0
CONSTIPATION: 0
VOMITING: 0
COUGH: 1
DOUBLE VISION: 0
DIARRHEA: 0
ORTHOPNEA: 0
SHORTNESS OF BREATH: 0
HEMOPTYSIS: 0

## 2017-09-01 LAB
BASOPHILS ABSOLUTE: 0 K/UL (ref 0–0.2)
BASOPHILS RELATIVE PERCENT: 0.2 % (ref 0–1)
EOSINOPHILS ABSOLUTE: 0.2 K/UL (ref 0–0.6)
EOSINOPHILS RELATIVE PERCENT: 1.2 % (ref 0–5)
GLUCOSE BLD-MCNC: 181 MG/DL (ref 70–99)
GLUCOSE BLD-MCNC: 201 MG/DL (ref 70–99)
GLUCOSE BLD-MCNC: 205 MG/DL (ref 70–99)
GLUCOSE BLD-MCNC: 300 MG/DL (ref 70–99)
HCT VFR BLD CALC: 44.9 % (ref 42–52)
HEMOGLOBIN: 14.2 G/DL (ref 14–18)
LYMPHOCYTES ABSOLUTE: 4.1 K/UL (ref 1.1–4.5)
LYMPHOCYTES RELATIVE PERCENT: 26.2 % (ref 20–40)
MCH RBC QN AUTO: 26.7 PG (ref 27–31)
MCHC RBC AUTO-ENTMCNC: 31.6 G/DL (ref 33–37)
MCV RBC AUTO: 84.6 FL (ref 80–94)
MONOCYTES ABSOLUTE: 1.1 K/UL (ref 0–0.9)
MONOCYTES RELATIVE PERCENT: 7.1 % (ref 0–10)
NEUTROPHILS ABSOLUTE: 9.8 K/UL (ref 1.5–7.5)
NEUTROPHILS RELATIVE PERCENT: 62.7 % (ref 50–65)
PDW BLD-RTO: 14.7 % (ref 11.5–14.5)
PERFORMED ON: ABNORMAL
PLATELET # BLD: 182 K/UL (ref 130–400)
PMV BLD AUTO: 11.6 FL (ref 9.4–12.4)
RBC # BLD: 5.31 M/UL (ref 4.7–6.1)
WBC # BLD: 15.6 K/UL (ref 4.8–10.8)

## 2017-09-01 PROCEDURE — 94668 MNPJ CHEST WALL SBSQ: CPT

## 2017-09-01 PROCEDURE — 6370000000 HC RX 637 (ALT 250 FOR IP): Performed by: FAMILY MEDICINE

## 2017-09-01 PROCEDURE — 6360000002 HC RX W HCPCS: Performed by: EMERGENCY MEDICINE

## 2017-09-01 PROCEDURE — 94667 MNPJ CHEST WALL 1ST: CPT

## 2017-09-01 PROCEDURE — 6370000000 HC RX 637 (ALT 250 FOR IP): Performed by: EMERGENCY MEDICINE

## 2017-09-01 PROCEDURE — 6360000002 HC RX W HCPCS: Performed by: FAMILY MEDICINE

## 2017-09-01 PROCEDURE — 2700000000 HC OXYGEN THERAPY PER DAY

## 2017-09-01 PROCEDURE — 6370000000 HC RX 637 (ALT 250 FOR IP): Performed by: INTERNAL MEDICINE

## 2017-09-01 PROCEDURE — 2580000003 HC RX 258: Performed by: EMERGENCY MEDICINE

## 2017-09-01 PROCEDURE — 1210000000 HC MED SURG R&B

## 2017-09-01 PROCEDURE — 94640 AIRWAY INHALATION TREATMENT: CPT

## 2017-09-01 PROCEDURE — 85025 COMPLETE CBC W/AUTO DIFF WBC: CPT

## 2017-09-01 PROCEDURE — 2580000003 HC RX 258: Performed by: FAMILY MEDICINE

## 2017-09-01 PROCEDURE — 36415 COLL VENOUS BLD VENIPUNCTURE: CPT

## 2017-09-01 PROCEDURE — 82948 REAGENT STRIP/BLOOD GLUCOSE: CPT

## 2017-09-01 PROCEDURE — 6360000002 HC RX W HCPCS: Performed by: NURSE PRACTITIONER

## 2017-09-01 RX ADMIN — METHYLPREDNISOLONE SODIUM SUCCINATE 20 MG: 40 INJECTION, POWDER, FOR SOLUTION INTRAMUSCULAR; INTRAVENOUS at 05:08

## 2017-09-01 RX ADMIN — ALBUTEROL SULFATE 2.5 MG: 2.5 SOLUTION RESPIRATORY (INHALATION) at 11:04

## 2017-09-01 RX ADMIN — PANTOPRAZOLE SODIUM 40 MG: 40 TABLET, DELAYED RELEASE ORAL at 05:08

## 2017-09-01 RX ADMIN — ENOXAPARIN SODIUM 40 MG: 40 INJECTION SUBCUTANEOUS at 17:02

## 2017-09-01 RX ADMIN — PROPAFENONE HYDROCHLORIDE 300 MG: 150 TABLET, FILM COATED ORAL at 13:08

## 2017-09-01 RX ADMIN — BENZONATATE 100 MG: 100 CAPSULE ORAL at 21:02

## 2017-09-01 RX ADMIN — ALBUTEROL SULFATE 2.5 MG: 2.5 SOLUTION RESPIRATORY (INHALATION) at 07:46

## 2017-09-01 RX ADMIN — HYDROCHLOROTHIAZIDE 12.5 MG: 12.5 CAPSULE ORAL at 09:06

## 2017-09-01 RX ADMIN — INSULIN LISPRO 4 UNITS: 100 INJECTION, SOLUTION INTRAVENOUS; SUBCUTANEOUS at 09:05

## 2017-09-01 RX ADMIN — INSULIN LISPRO 8 UNITS: 100 INJECTION, SOLUTION INTRAVENOUS; SUBCUTANEOUS at 12:52

## 2017-09-01 RX ADMIN — ATORVASTATIN CALCIUM 40 MG: 40 TABLET, FILM COATED ORAL at 21:02

## 2017-09-01 RX ADMIN — BENZONATATE 100 MG: 100 CAPSULE ORAL at 09:06

## 2017-09-01 RX ADMIN — BENZONATATE 100 MG: 100 CAPSULE ORAL at 13:08

## 2017-09-01 RX ADMIN — ASPIRIN 81 MG: 81 TABLET, COATED ORAL at 21:01

## 2017-09-01 RX ADMIN — PROPAFENONE HYDROCHLORIDE 300 MG: 150 TABLET, FILM COATED ORAL at 09:05

## 2017-09-01 RX ADMIN — Medication 10 ML: at 10:03

## 2017-09-01 RX ADMIN — Medication 10 ML: at 21:01

## 2017-09-01 RX ADMIN — INSULIN LISPRO 2 UNITS: 100 INJECTION, SOLUTION INTRAVENOUS; SUBCUTANEOUS at 17:02

## 2017-09-01 RX ADMIN — ALBUTEROL SULFATE 2.5 MG: 2.5 SOLUTION RESPIRATORY (INHALATION) at 22:29

## 2017-09-01 RX ADMIN — INSULIN LISPRO 2 UNITS: 100 INJECTION, SOLUTION INTRAVENOUS; SUBCUTANEOUS at 21:07

## 2017-09-01 RX ADMIN — ALBUTEROL SULFATE 2.5 MG: 2.5 SOLUTION RESPIRATORY (INHALATION) at 15:29

## 2017-09-01 RX ADMIN — LOSARTAN POTASSIUM 50 MG: 25 TABLET ORAL at 09:06

## 2017-09-01 RX ADMIN — Medication 5 ML: at 23:55

## 2017-09-01 RX ADMIN — PROPAFENONE HYDROCHLORIDE 300 MG: 150 TABLET, FILM COATED ORAL at 21:02

## 2017-09-01 RX ADMIN — METFORMIN HYDROCHLORIDE 500 MG: 500 TABLET, FILM COATED ORAL at 17:02

## 2017-09-01 RX ADMIN — ALBUTEROL SULFATE 2.5 MG: 2.5 SOLUTION RESPIRATORY (INHALATION) at 18:38

## 2017-09-01 RX ADMIN — MULTIPLE VITAMINS W/ MINERALS TAB 1 TABLET: TAB at 09:06

## 2017-09-01 RX ADMIN — METFORMIN HYDROCHLORIDE 500 MG: 500 TABLET, FILM COATED ORAL at 09:06

## 2017-09-01 RX ADMIN — CEFTRIAXONE SODIUM 1 G: 1 INJECTION, POWDER, FOR SOLUTION INTRAMUSCULAR; INTRAVENOUS at 13:10

## 2017-09-02 LAB
ALBUMIN SERPL-MCNC: 3.2 G/DL (ref 3.5–5.2)
ALP BLD-CCNC: 83 U/L (ref 40–130)
ALT SERPL-CCNC: 66 U/L (ref 5–41)
ANION GAP SERPL CALCULATED.3IONS-SCNC: 17 MMOL/L (ref 7–19)
AST SERPL-CCNC: 32 U/L (ref 5–40)
BASOPHILS ABSOLUTE: 0.1 K/UL (ref 0–0.2)
BASOPHILS RELATIVE PERCENT: 0.3 % (ref 0–1)
BILIRUB SERPL-MCNC: <0.2 MG/DL (ref 0.2–1.2)
BUN BLDV-MCNC: 24 MG/DL (ref 8–23)
CALCIUM SERPL-MCNC: 9 MG/DL (ref 8.8–10.2)
CHLORIDE BLD-SCNC: 98 MMOL/L (ref 98–111)
CO2: 25 MMOL/L (ref 22–29)
CREAT SERPL-MCNC: 0.9 MG/DL (ref 0.5–1.2)
EOSINOPHILS ABSOLUTE: 0.3 K/UL (ref 0–0.6)
EOSINOPHILS RELATIVE PERCENT: 1.9 % (ref 0–5)
GFR NON-AFRICAN AMERICAN: >60
GLUCOSE BLD-MCNC: 176 MG/DL (ref 70–99)
GLUCOSE BLD-MCNC: 179 MG/DL (ref 70–99)
GLUCOSE BLD-MCNC: 184 MG/DL (ref 70–99)
GLUCOSE BLD-MCNC: 237 MG/DL (ref 74–109)
GLUCOSE BLD-MCNC: 259 MG/DL (ref 70–99)
HCT VFR BLD CALC: 44.7 % (ref 42–52)
HEMOGLOBIN: 13.8 G/DL (ref 14–18)
LYMPHOCYTES ABSOLUTE: 4.7 K/UL (ref 1.1–4.5)
LYMPHOCYTES RELATIVE PERCENT: 26.9 % (ref 20–40)
MCH RBC QN AUTO: 26.8 PG (ref 27–31)
MCHC RBC AUTO-ENTMCNC: 30.9 G/DL (ref 33–37)
MCV RBC AUTO: 87 FL (ref 80–94)
MONOCYTES ABSOLUTE: 1.1 K/UL (ref 0–0.9)
MONOCYTES RELATIVE PERCENT: 6.4 % (ref 0–10)
NEUTROPHILS ABSOLUTE: 10.8 K/UL (ref 1.5–7.5)
NEUTROPHILS RELATIVE PERCENT: 61.7 % (ref 50–65)
PDW BLD-RTO: 14.9 % (ref 11.5–14.5)
PERFORMED ON: ABNORMAL
PLATELET # BLD: 184 K/UL (ref 130–400)
PMV BLD AUTO: 11.5 FL (ref 9.4–12.4)
POTASSIUM SERPL-SCNC: 4 MMOL/L (ref 3.5–5)
RBC # BLD: 5.14 M/UL (ref 4.7–6.1)
SODIUM BLD-SCNC: 140 MMOL/L (ref 136–145)
TOTAL PROTEIN: 5.8 G/DL (ref 6.6–8.7)
WBC # BLD: 17.5 K/UL (ref 4.8–10.8)

## 2017-09-02 PROCEDURE — 85025 COMPLETE CBC W/AUTO DIFF WBC: CPT

## 2017-09-02 PROCEDURE — 6370000000 HC RX 637 (ALT 250 FOR IP): Performed by: INTERNAL MEDICINE

## 2017-09-02 PROCEDURE — 6360000002 HC RX W HCPCS: Performed by: EMERGENCY MEDICINE

## 2017-09-02 PROCEDURE — 94668 MNPJ CHEST WALL SBSQ: CPT

## 2017-09-02 PROCEDURE — 6370000000 HC RX 637 (ALT 250 FOR IP): Performed by: EMERGENCY MEDICINE

## 2017-09-02 PROCEDURE — 36415 COLL VENOUS BLD VENIPUNCTURE: CPT

## 2017-09-02 PROCEDURE — 80053 COMPREHEN METABOLIC PANEL: CPT

## 2017-09-02 PROCEDURE — 6370000000 HC RX 637 (ALT 250 FOR IP): Performed by: FAMILY MEDICINE

## 2017-09-02 PROCEDURE — 6360000002 HC RX W HCPCS: Performed by: FAMILY MEDICINE

## 2017-09-02 PROCEDURE — 82948 REAGENT STRIP/BLOOD GLUCOSE: CPT

## 2017-09-02 PROCEDURE — 1210000000 HC MED SURG R&B

## 2017-09-02 PROCEDURE — 94640 AIRWAY INHALATION TREATMENT: CPT

## 2017-09-02 PROCEDURE — 2580000003 HC RX 258: Performed by: EMERGENCY MEDICINE

## 2017-09-02 PROCEDURE — 2580000003 HC RX 258: Performed by: FAMILY MEDICINE

## 2017-09-02 RX ORDER — GUAIFENESIN 600 MG/1
1200 TABLET, EXTENDED RELEASE ORAL 2 TIMES DAILY
Status: DISCONTINUED | OUTPATIENT
Start: 2017-09-02 | End: 2017-09-05 | Stop reason: HOSPADM

## 2017-09-02 RX ADMIN — CEFTRIAXONE SODIUM 1 G: 1 INJECTION, POWDER, FOR SOLUTION INTRAMUSCULAR; INTRAVENOUS at 14:32

## 2017-09-02 RX ADMIN — Medication 10 ML: at 20:12

## 2017-09-02 RX ADMIN — MULTIPLE VITAMINS W/ MINERALS TAB 1 TABLET: TAB at 08:20

## 2017-09-02 RX ADMIN — ALBUTEROL SULFATE 2.5 MG: 2.5 SOLUTION RESPIRATORY (INHALATION) at 10:49

## 2017-09-02 RX ADMIN — ALBUTEROL SULFATE 2.5 MG: 2.5 SOLUTION RESPIRATORY (INHALATION) at 22:31

## 2017-09-02 RX ADMIN — PANTOPRAZOLE SODIUM 40 MG: 40 TABLET, DELAYED RELEASE ORAL at 06:39

## 2017-09-02 RX ADMIN — GUAIFENESIN 1200 MG: 600 TABLET, EXTENDED RELEASE ORAL at 20:24

## 2017-09-02 RX ADMIN — BENZONATATE 100 MG: 100 CAPSULE ORAL at 14:32

## 2017-09-02 RX ADMIN — ENOXAPARIN SODIUM 40 MG: 40 INJECTION SUBCUTANEOUS at 18:30

## 2017-09-02 RX ADMIN — HYDROCHLOROTHIAZIDE 12.5 MG: 12.5 CAPSULE ORAL at 08:20

## 2017-09-02 RX ADMIN — ASPIRIN 81 MG: 81 TABLET, COATED ORAL at 20:12

## 2017-09-02 RX ADMIN — LOSARTAN POTASSIUM 50 MG: 25 TABLET ORAL at 08:20

## 2017-09-02 RX ADMIN — METFORMIN HYDROCHLORIDE 500 MG: 500 TABLET, FILM COATED ORAL at 08:20

## 2017-09-02 RX ADMIN — BENZONATATE 100 MG: 100 CAPSULE ORAL at 20:12

## 2017-09-02 RX ADMIN — METFORMIN HYDROCHLORIDE 500 MG: 500 TABLET, FILM COATED ORAL at 18:30

## 2017-09-02 RX ADMIN — ALBUTEROL SULFATE 2.5 MG: 2.5 SOLUTION RESPIRATORY (INHALATION) at 18:30

## 2017-09-02 RX ADMIN — PROPAFENONE HYDROCHLORIDE 300 MG: 150 TABLET, FILM COATED ORAL at 14:32

## 2017-09-02 RX ADMIN — ATORVASTATIN CALCIUM 40 MG: 40 TABLET, FILM COATED ORAL at 20:12

## 2017-09-02 RX ADMIN — BENZONATATE 100 MG: 100 CAPSULE ORAL at 08:19

## 2017-09-02 RX ADMIN — PROPAFENONE HYDROCHLORIDE 300 MG: 150 TABLET, FILM COATED ORAL at 20:12

## 2017-09-02 RX ADMIN — ALBUTEROL SULFATE 2.5 MG: 2.5 SOLUTION RESPIRATORY (INHALATION) at 06:56

## 2017-09-02 RX ADMIN — PROPAFENONE HYDROCHLORIDE 300 MG: 150 TABLET, FILM COATED ORAL at 08:20

## 2017-09-02 RX ADMIN — Medication 10 ML: at 08:20

## 2017-09-02 RX ADMIN — ALBUTEROL SULFATE 2.5 MG: 2.5 SOLUTION RESPIRATORY (INHALATION) at 15:34

## 2017-09-03 LAB
GLUCOSE BLD-MCNC: 155 MG/DL (ref 70–99)
GLUCOSE BLD-MCNC: 162 MG/DL (ref 70–99)
GLUCOSE BLD-MCNC: 165 MG/DL (ref 70–99)
GLUCOSE BLD-MCNC: 227 MG/DL (ref 70–99)
PERFORMED ON: ABNORMAL

## 2017-09-03 PROCEDURE — 6370000000 HC RX 637 (ALT 250 FOR IP): Performed by: FAMILY MEDICINE

## 2017-09-03 PROCEDURE — 6360000002 HC RX W HCPCS: Performed by: FAMILY MEDICINE

## 2017-09-03 PROCEDURE — 6370000000 HC RX 637 (ALT 250 FOR IP): Performed by: INTERNAL MEDICINE

## 2017-09-03 PROCEDURE — 94668 MNPJ CHEST WALL SBSQ: CPT

## 2017-09-03 PROCEDURE — 2580000003 HC RX 258: Performed by: FAMILY MEDICINE

## 2017-09-03 PROCEDURE — 82948 REAGENT STRIP/BLOOD GLUCOSE: CPT

## 2017-09-03 PROCEDURE — 94640 AIRWAY INHALATION TREATMENT: CPT

## 2017-09-03 PROCEDURE — 1210000000 HC MED SURG R&B

## 2017-09-03 PROCEDURE — 6360000002 HC RX W HCPCS: Performed by: EMERGENCY MEDICINE

## 2017-09-03 PROCEDURE — 2580000003 HC RX 258: Performed by: EMERGENCY MEDICINE

## 2017-09-03 PROCEDURE — 6370000000 HC RX 637 (ALT 250 FOR IP): Performed by: EMERGENCY MEDICINE

## 2017-09-03 RX ADMIN — PROPAFENONE HYDROCHLORIDE 300 MG: 150 TABLET, FILM COATED ORAL at 09:27

## 2017-09-03 RX ADMIN — LOSARTAN POTASSIUM 50 MG: 25 TABLET ORAL at 09:28

## 2017-09-03 RX ADMIN — BENZONATATE 100 MG: 100 CAPSULE ORAL at 09:28

## 2017-09-03 RX ADMIN — ALBUTEROL SULFATE 2.5 MG: 2.5 SOLUTION RESPIRATORY (INHALATION) at 07:02

## 2017-09-03 RX ADMIN — ALBUTEROL SULFATE 2.5 MG: 2.5 SOLUTION RESPIRATORY (INHALATION) at 10:34

## 2017-09-03 RX ADMIN — GUAIFENESIN 1200 MG: 600 TABLET, EXTENDED RELEASE ORAL at 21:41

## 2017-09-03 RX ADMIN — ALBUTEROL SULFATE 2.5 MG: 2.5 SOLUTION RESPIRATORY (INHALATION) at 15:02

## 2017-09-03 RX ADMIN — PROPAFENONE HYDROCHLORIDE 300 MG: 150 TABLET, FILM COATED ORAL at 15:21

## 2017-09-03 RX ADMIN — PROPAFENONE HYDROCHLORIDE 300 MG: 150 TABLET, FILM COATED ORAL at 21:41

## 2017-09-03 RX ADMIN — BENZONATATE 100 MG: 100 CAPSULE ORAL at 21:41

## 2017-09-03 RX ADMIN — MULTIPLE VITAMINS W/ MINERALS TAB 1 TABLET: TAB at 09:27

## 2017-09-03 RX ADMIN — ASPIRIN 81 MG: 81 TABLET, COATED ORAL at 21:41

## 2017-09-03 RX ADMIN — ALBUTEROL SULFATE 2.5 MG: 2.5 SOLUTION RESPIRATORY (INHALATION) at 19:44

## 2017-09-03 RX ADMIN — METFORMIN HYDROCHLORIDE 500 MG: 500 TABLET, FILM COATED ORAL at 09:28

## 2017-09-03 RX ADMIN — BENZONATATE 100 MG: 100 CAPSULE ORAL at 15:22

## 2017-09-03 RX ADMIN — INSULIN LISPRO 4 UNITS: 100 INJECTION, SOLUTION INTRAVENOUS; SUBCUTANEOUS at 18:19

## 2017-09-03 RX ADMIN — ENOXAPARIN SODIUM 40 MG: 40 INJECTION SUBCUTANEOUS at 18:20

## 2017-09-03 RX ADMIN — ATORVASTATIN CALCIUM 40 MG: 40 TABLET, FILM COATED ORAL at 21:41

## 2017-09-03 RX ADMIN — PANTOPRAZOLE SODIUM 40 MG: 40 TABLET, DELAYED RELEASE ORAL at 05:24

## 2017-09-03 RX ADMIN — HYDROCHLOROTHIAZIDE 12.5 MG: 12.5 CAPSULE ORAL at 09:27

## 2017-09-03 RX ADMIN — METFORMIN HYDROCHLORIDE 500 MG: 500 TABLET, FILM COATED ORAL at 18:20

## 2017-09-03 RX ADMIN — Medication 10 ML: at 21:41

## 2017-09-03 RX ADMIN — GUAIFENESIN 1200 MG: 600 TABLET, EXTENDED RELEASE ORAL at 09:27

## 2017-09-03 RX ADMIN — CEFTRIAXONE SODIUM 1 G: 1 INJECTION, POWDER, FOR SOLUTION INTRAMUSCULAR; INTRAVENOUS at 15:21

## 2017-09-04 VITALS
RESPIRATION RATE: 18 BRPM | SYSTOLIC BLOOD PRESSURE: 137 MMHG | OXYGEN SATURATION: 93 % | BODY MASS INDEX: 38.5 KG/M2 | TEMPERATURE: 96.8 F | DIASTOLIC BLOOD PRESSURE: 74 MMHG | HEART RATE: 87 BPM | HEIGHT: 74 IN | WEIGHT: 300 LBS

## 2017-09-04 LAB
GLUCOSE BLD-MCNC: 150 MG/DL (ref 70–99)
GLUCOSE BLD-MCNC: 157 MG/DL (ref 70–99)
GLUCOSE BLD-MCNC: 172 MG/DL (ref 70–99)
PERFORMED ON: ABNORMAL

## 2017-09-04 PROCEDURE — 6360000002 HC RX W HCPCS: Performed by: FAMILY MEDICINE

## 2017-09-04 PROCEDURE — 6370000000 HC RX 637 (ALT 250 FOR IP): Performed by: EMERGENCY MEDICINE

## 2017-09-04 PROCEDURE — 82948 REAGENT STRIP/BLOOD GLUCOSE: CPT

## 2017-09-04 PROCEDURE — 94640 AIRWAY INHALATION TREATMENT: CPT

## 2017-09-04 PROCEDURE — 6370000000 HC RX 637 (ALT 250 FOR IP): Performed by: INTERNAL MEDICINE

## 2017-09-04 PROCEDURE — 2580000003 HC RX 258: Performed by: EMERGENCY MEDICINE

## 2017-09-04 PROCEDURE — 2580000003 HC RX 258: Performed by: FAMILY MEDICINE

## 2017-09-04 PROCEDURE — 94668 MNPJ CHEST WALL SBSQ: CPT

## 2017-09-04 PROCEDURE — 6370000000 HC RX 637 (ALT 250 FOR IP): Performed by: FAMILY MEDICINE

## 2017-09-04 RX ORDER — CEFDINIR 300 MG/1
300 CAPSULE ORAL 2 TIMES DAILY
Qty: 10 CAPSULE | Refills: 0 | Status: SHIPPED | OUTPATIENT
Start: 2017-09-04 | End: 2017-09-14

## 2017-09-04 RX ORDER — ALBUTEROL SULFATE 90 UG/1
2 AEROSOL, METERED RESPIRATORY (INHALATION) EVERY 6 HOURS PRN
Qty: 1 INHALER | Refills: 0 | Status: SHIPPED | OUTPATIENT
Start: 2017-09-04

## 2017-09-04 RX ORDER — LOSARTAN POTASSIUM 50 MG/1
50 TABLET ORAL DAILY
Qty: 30 TABLET | Refills: 0 | Status: SHIPPED | OUTPATIENT
Start: 2017-09-04 | End: 2017-12-18 | Stop reason: ALTCHOICE

## 2017-09-04 RX ORDER — BENZONATATE 100 MG/1
100 CAPSULE ORAL 3 TIMES DAILY
Qty: 30 CAPSULE | Refills: 0 | Status: SHIPPED | OUTPATIENT
Start: 2017-09-04 | End: 2017-09-11

## 2017-09-04 RX ORDER — ALBUTEROL SULFATE 90 UG/1
2 AEROSOL, METERED RESPIRATORY (INHALATION) EVERY 6 HOURS PRN
Status: DISCONTINUED | OUTPATIENT
Start: 2017-09-04 | End: 2017-09-05 | Stop reason: HOSPADM

## 2017-09-04 RX ORDER — HYDROCHLOROTHIAZIDE 12.5 MG/1
12.5 CAPSULE, GELATIN COATED ORAL DAILY
Qty: 30 CAPSULE | Refills: 0 | Status: SHIPPED | OUTPATIENT
Start: 2017-09-04 | End: 2017-09-20

## 2017-09-04 RX ORDER — ALBUTEROL SULFATE 2.5 MG/3ML
2.5 SOLUTION RESPIRATORY (INHALATION)
Qty: 25 EACH | Refills: 0 | Status: SHIPPED | OUTPATIENT
Start: 2017-09-04 | End: 2017-09-20

## 2017-09-04 RX ORDER — GUAIFENESIN 600 MG/1
1200 TABLET, EXTENDED RELEASE ORAL 2 TIMES DAILY
Qty: 60 TABLET | Refills: 0 | Status: SHIPPED | OUTPATIENT
Start: 2017-09-04 | End: 2017-09-20

## 2017-09-04 RX ADMIN — CEFTRIAXONE SODIUM 1 G: 1 INJECTION, POWDER, FOR SOLUTION INTRAMUSCULAR; INTRAVENOUS at 08:00

## 2017-09-04 RX ADMIN — BENZONATATE 100 MG: 100 CAPSULE ORAL at 15:06

## 2017-09-04 RX ADMIN — METFORMIN HYDROCHLORIDE 500 MG: 500 TABLET, FILM COATED ORAL at 10:34

## 2017-09-04 RX ADMIN — ALBUTEROL SULFATE 2.5 MG: 2.5 SOLUTION RESPIRATORY (INHALATION) at 10:48

## 2017-09-04 RX ADMIN — HYDROCHLOROTHIAZIDE 12.5 MG: 12.5 CAPSULE ORAL at 10:35

## 2017-09-04 RX ADMIN — GUAIFENESIN 1200 MG: 600 TABLET, EXTENDED RELEASE ORAL at 10:35

## 2017-09-04 RX ADMIN — ALBUTEROL SULFATE 2.5 MG: 2.5 SOLUTION RESPIRATORY (INHALATION) at 14:56

## 2017-09-04 RX ADMIN — ALBUTEROL SULFATE 2.5 MG: 2.5 SOLUTION RESPIRATORY (INHALATION) at 18:59

## 2017-09-04 RX ADMIN — METFORMIN HYDROCHLORIDE 500 MG: 500 TABLET, FILM COATED ORAL at 18:02

## 2017-09-04 RX ADMIN — ALBUTEROL SULFATE 2.5 MG: 2.5 SOLUTION RESPIRATORY (INHALATION) at 07:14

## 2017-09-04 RX ADMIN — Medication 10 ML: at 10:36

## 2017-09-04 RX ADMIN — MULTIPLE VITAMINS W/ MINERALS TAB 1 TABLET: TAB at 10:34

## 2017-09-04 RX ADMIN — PANTOPRAZOLE SODIUM 40 MG: 40 TABLET, DELAYED RELEASE ORAL at 06:16

## 2017-09-04 RX ADMIN — PROPAFENONE HYDROCHLORIDE 300 MG: 150 TABLET, FILM COATED ORAL at 15:06

## 2017-09-04 RX ADMIN — BENZONATATE 100 MG: 100 CAPSULE ORAL at 10:39

## 2017-09-04 RX ADMIN — PROPAFENONE HYDROCHLORIDE 300 MG: 150 TABLET, FILM COATED ORAL at 10:34

## 2017-09-04 RX ADMIN — LOSARTAN POTASSIUM 50 MG: 25 TABLET ORAL at 10:35

## 2017-09-06 LAB
EKG P AXIS: 61 DEGREES
EKG P-R INTERVAL: 166 MS
EKG Q-T INTERVAL: 356 MS
EKG QRS DURATION: 149 MS
EKG QTC CALCULATION (BAZETT): 464 MS
EKG T AXIS: 28 DEGREES

## 2017-09-20 ENCOUNTER — APPOINTMENT (OUTPATIENT)
Dept: GENERAL RADIOLOGY | Age: 62
DRG: 871 | End: 2017-09-20
Payer: COMMERCIAL

## 2017-09-20 ENCOUNTER — HOSPITAL ENCOUNTER (INPATIENT)
Age: 62
LOS: 8 days | Discharge: HOME | DRG: 871 | End: 2017-09-28
Attending: EMERGENCY MEDICINE | Admitting: FAMILY MEDICINE
Payer: COMMERCIAL

## 2017-09-20 DIAGNOSIS — J18.9 PNEUMONIA OF LEFT LOWER LOBE DUE TO INFECTIOUS ORGANISM: ICD-10-CM

## 2017-09-20 DIAGNOSIS — R09.02 HYPOXIA: ICD-10-CM

## 2017-09-20 DIAGNOSIS — J18.9 PNEUMONIA DUE TO ORGANISM: Primary | ICD-10-CM

## 2017-09-20 LAB
ALBUMIN SERPL-MCNC: 3.8 G/DL (ref 3.5–5.2)
ALP BLD-CCNC: 76 U/L (ref 40–130)
ALT SERPL-CCNC: 24 U/L (ref 5–41)
ANION GAP SERPL CALCULATED.3IONS-SCNC: 15 MMOL/L (ref 7–19)
ANISOCYTOSIS: ABNORMAL
APTT: 23.6 SEC (ref 26–36.2)
AST SERPL-CCNC: 14 U/L (ref 5–40)
BANDED NEUTROPHILS RELATIVE PERCENT: 15 % (ref 0–5)
BASE EXCESS ARTERIAL: 0.4 MMOL/L (ref -2–2)
BASOPHILS ABSOLUTE: 0 K/UL (ref 0–0.2)
BASOPHILS MANUAL: 0 %
BASOPHILS RELATIVE PERCENT: 0 % (ref 0–1)
BILIRUB SERPL-MCNC: 0.9 MG/DL (ref 0.2–1.2)
BUN BLDV-MCNC: 21 MG/DL (ref 8–23)
CALCIUM SERPL-MCNC: 9 MG/DL (ref 8.8–10.2)
CARBOXYHEMOGLOBIN ARTERIAL: 1.1 % (ref 0–5)
CHLORIDE BLD-SCNC: 102 MMOL/L (ref 98–111)
CO2: 24 MMOL/L (ref 22–29)
CREAT SERPL-MCNC: 0.8 MG/DL (ref 0.5–1.2)
EOSINOPHILS ABSOLUTE: 0 K/UL (ref 0–0.6)
EOSINOPHILS RELATIVE PERCENT: 0 % (ref 0–5)
GFR NON-AFRICAN AMERICAN: >60
GLUCOSE BLD-MCNC: 175 MG/DL (ref 74–109)
HCO3 ARTERIAL: 23.3 MMOL/L (ref 22–26)
HCT VFR BLD CALC: 46.9 % (ref 42–52)
HEMOGLOBIN, ART, EXTENDED: 15.3 G/DL (ref 14–18)
HEMOGLOBIN: 15 G/DL (ref 14–18)
LV EF: 55 %
LVEF MODALITY: NORMAL
LYMPHOCYTES ABSOLUTE: 1.3 K/UL (ref 1.1–4.5)
LYMPHOCYTES RELATIVE PERCENT: 20 % (ref 20–40)
MCH RBC QN AUTO: 27.1 PG (ref 27–31)
MCHC RBC AUTO-ENTMCNC: 32 G/DL (ref 33–37)
MCV RBC AUTO: 84.8 FL (ref 80–94)
METHEMOGLOBIN ARTERIAL: 0.4 %
MICROCYTES: ABNORMAL
MONOCYTES ABSOLUTE: 0.1 K/UL (ref 0–0.9)
MONOCYTES RELATIVE PERCENT: 1 % (ref 0–10)
NEUTROPHILS ABSOLUTE: 5.3 K/UL (ref 1.5–7.5)
NEUTROPHILS MANUAL: 64 %
NEUTROPHILS RELATIVE PERCENT: 64 % (ref 50–65)
O2 CONTENT ARTERIAL: 19.7 ML/DL
O2 SAT, ARTERIAL: 91.7 %
O2 THERAPY: ABNORMAL
PCO2 ARTERIAL: 32 MMHG (ref 35–45)
PDW BLD-RTO: 15.4 % (ref 11.5–14.5)
PERFORMED ON: NORMAL
PH ARTERIAL: 7.47 (ref 7.35–7.45)
PLATELET # BLD: 121 K/UL (ref 130–400)
PLATELET SLIDE REVIEW: ADEQUATE
PMV BLD AUTO: 11.7 FL (ref 9.4–12.4)
PO2 ARTERIAL: 57 MMHG (ref 80–100)
POC TROPONIN I: 0 NG/ML (ref 0–0.08)
POTASSIUM SERPL-SCNC: 4.6 MMOL/L (ref 3.5–5)
POTASSIUM, WHOLE BLOOD: 4.3
PRO-BNP: 250 PG/ML (ref 0–900)
RBC # BLD: 5.53 M/UL (ref 4.7–6.1)
SODIUM BLD-SCNC: 141 MMOL/L (ref 136–145)
TOTAL PROTEIN: 6.4 G/DL (ref 6.6–8.7)
WBC # BLD: 6.7 K/UL (ref 4.8–10.8)

## 2017-09-20 PROCEDURE — 83880 ASSAY OF NATRIURETIC PEPTIDE: CPT

## 2017-09-20 PROCEDURE — 82803 BLOOD GASES ANY COMBINATION: CPT

## 2017-09-20 PROCEDURE — 87015 SPECIMEN INFECT AGNT CONCNTJ: CPT

## 2017-09-20 PROCEDURE — 87184 SC STD DISK METHOD PER PLATE: CPT

## 2017-09-20 PROCEDURE — 94664 DEMO&/EVAL PT USE INHALER: CPT

## 2017-09-20 PROCEDURE — 87077 CULTURE AEROBIC IDENTIFY: CPT

## 2017-09-20 PROCEDURE — 71010 XR CHEST PORTABLE: CPT

## 2017-09-20 PROCEDURE — 87102 FUNGUS ISOLATION CULTURE: CPT

## 2017-09-20 PROCEDURE — G8998 SWALLOW D/C STATUS: HCPCS

## 2017-09-20 PROCEDURE — 2500000003 HC RX 250 WO HCPCS: Performed by: FAMILY MEDICINE

## 2017-09-20 PROCEDURE — 6370000000 HC RX 637 (ALT 250 FOR IP): Performed by: FAMILY MEDICINE

## 2017-09-20 PROCEDURE — 87205 SMEAR GRAM STAIN: CPT

## 2017-09-20 PROCEDURE — 87070 CULTURE OTHR SPECIMN AEROBIC: CPT

## 2017-09-20 PROCEDURE — 6370000000 HC RX 637 (ALT 250 FOR IP): Performed by: NURSE PRACTITIONER

## 2017-09-20 PROCEDURE — 86612 BLASTOMYCES ANTIBODY: CPT

## 2017-09-20 PROCEDURE — 94640 AIRWAY INHALATION TREATMENT: CPT

## 2017-09-20 PROCEDURE — 6360000002 HC RX W HCPCS: Performed by: EMERGENCY MEDICINE

## 2017-09-20 PROCEDURE — 84132 ASSAY OF SERUM POTASSIUM: CPT

## 2017-09-20 PROCEDURE — 80053 COMPREHEN METABOLIC PANEL: CPT

## 2017-09-20 PROCEDURE — 85730 THROMBOPLASTIN TIME PARTIAL: CPT

## 2017-09-20 PROCEDURE — 2580000003 HC RX 258: Performed by: FAMILY MEDICINE

## 2017-09-20 PROCEDURE — 99285 EMERGENCY DEPT VISIT HI MDM: CPT

## 2017-09-20 PROCEDURE — 6360000002 HC RX W HCPCS

## 2017-09-20 PROCEDURE — 85025 COMPLETE CBC W/AUTO DIFF WBC: CPT

## 2017-09-20 PROCEDURE — 94667 MNPJ CHEST WALL 1ST: CPT

## 2017-09-20 PROCEDURE — 87206 SMEAR FLUORESCENT/ACID STAI: CPT

## 2017-09-20 PROCEDURE — 93005 ELECTROCARDIOGRAM TRACING: CPT

## 2017-09-20 PROCEDURE — 87040 BLOOD CULTURE FOR BACTERIA: CPT

## 2017-09-20 PROCEDURE — G8996 SWALLOW CURRENT STATUS: HCPCS

## 2017-09-20 PROCEDURE — G8997 SWALLOW GOAL STATUS: HCPCS

## 2017-09-20 PROCEDURE — 84484 ASSAY OF TROPONIN QUANT: CPT

## 2017-09-20 PROCEDURE — 92610 EVALUATE SWALLOWING FUNCTION: CPT

## 2017-09-20 PROCEDURE — 1210000000 HC MED SURG R&B

## 2017-09-20 PROCEDURE — 87385 HISTOPLASMA CAPSUL AG IA: CPT

## 2017-09-20 PROCEDURE — 87116 MYCOBACTERIA CULTURE: CPT

## 2017-09-20 PROCEDURE — 6360000002 HC RX W HCPCS: Performed by: NURSE PRACTITIONER

## 2017-09-20 PROCEDURE — 36415 COLL VENOUS BLD VENIPUNCTURE: CPT

## 2017-09-20 PROCEDURE — 99284 EMERGENCY DEPT VISIT MOD MDM: CPT | Performed by: EMERGENCY MEDICINE

## 2017-09-20 PROCEDURE — 6360000002 HC RX W HCPCS: Performed by: FAMILY MEDICINE

## 2017-09-20 PROCEDURE — 87449 NOS EACH ORGANISM AG IA: CPT

## 2017-09-20 PROCEDURE — 36600 WITHDRAWAL OF ARTERIAL BLOOD: CPT

## 2017-09-20 PROCEDURE — 87185 SC STD ENZYME DETCJ PER NZM: CPT

## 2017-09-20 PROCEDURE — 93306 TTE W/DOPPLER COMPLETE: CPT

## 2017-09-20 RX ORDER — ACETAMINOPHEN 325 MG/1
650 TABLET ORAL EVERY 4 HOURS PRN
Status: DISCONTINUED | OUTPATIENT
Start: 2017-09-20 | End: 2017-09-25 | Stop reason: SDUPTHER

## 2017-09-20 RX ORDER — GUAIFENESIN 600 MG/1
1200 TABLET, EXTENDED RELEASE ORAL 2 TIMES DAILY
Status: DISCONTINUED | OUTPATIENT
Start: 2017-09-20 | End: 2017-09-28 | Stop reason: HOSPADM

## 2017-09-20 RX ORDER — IPRATROPIUM BROMIDE AND ALBUTEROL SULFATE 2.5; .5 MG/3ML; MG/3ML
1 SOLUTION RESPIRATORY (INHALATION) ONCE
Status: COMPLETED | OUTPATIENT
Start: 2017-09-20 | End: 2017-09-20

## 2017-09-20 RX ORDER — METHYLPREDNISOLONE SODIUM SUCCINATE 125 MG/2ML
125 INJECTION, POWDER, LYOPHILIZED, FOR SOLUTION INTRAMUSCULAR; INTRAVENOUS ONCE
Status: COMPLETED | OUTPATIENT
Start: 2017-09-20 | End: 2017-09-20

## 2017-09-20 RX ORDER — SODIUM CHLORIDE 0.9 % (FLUSH) 0.9 %
10 SYRINGE (ML) INJECTION PRN
Status: DISCONTINUED | OUTPATIENT
Start: 2017-09-20 | End: 2017-09-28 | Stop reason: HOSPADM

## 2017-09-20 RX ORDER — SODIUM CHLORIDE 0.9 % (FLUSH) 0.9 %
10 SYRINGE (ML) INJECTION EVERY 12 HOURS SCHEDULED
Status: DISCONTINUED | OUTPATIENT
Start: 2017-09-20 | End: 2017-09-20 | Stop reason: SDUPTHER

## 2017-09-20 RX ORDER — ASPIRIN 81 MG/1
81 TABLET ORAL DAILY
Status: DISCONTINUED | OUTPATIENT
Start: 2017-09-20 | End: 2017-09-28 | Stop reason: HOSPADM

## 2017-09-20 RX ORDER — ATORVASTATIN CALCIUM 40 MG/1
40 TABLET, FILM COATED ORAL DAILY
Status: DISCONTINUED | OUTPATIENT
Start: 2017-09-20 | End: 2017-09-28 | Stop reason: HOSPADM

## 2017-09-20 RX ORDER — ACETAMINOPHEN 325 MG/1
650 TABLET ORAL EVERY 4 HOURS PRN
Status: DISCONTINUED | OUTPATIENT
Start: 2017-09-20 | End: 2017-09-28 | Stop reason: HOSPADM

## 2017-09-20 RX ORDER — LEVOFLOXACIN 5 MG/ML
750 INJECTION, SOLUTION INTRAVENOUS ONCE
Status: DISCONTINUED | OUTPATIENT
Start: 2017-09-20 | End: 2017-09-20

## 2017-09-20 RX ORDER — IPRATROPIUM BROMIDE AND ALBUTEROL SULFATE 2.5; .5 MG/3ML; MG/3ML
1 SOLUTION RESPIRATORY (INHALATION)
Status: DISCONTINUED | OUTPATIENT
Start: 2017-09-20 | End: 2017-09-28 | Stop reason: HOSPADM

## 2017-09-20 RX ORDER — LEVOFLOXACIN 5 MG/ML
750 INJECTION, SOLUTION INTRAVENOUS EVERY 24 HOURS
Status: DISCONTINUED | OUTPATIENT
Start: 2017-09-21 | End: 2017-09-21

## 2017-09-20 RX ORDER — HYDROCODONE BITARTRATE AND ACETAMINOPHEN 5; 325 MG/1; MG/1
2 TABLET ORAL EVERY 4 HOURS PRN
Status: DISCONTINUED | OUTPATIENT
Start: 2017-09-20 | End: 2017-09-28 | Stop reason: HOSPADM

## 2017-09-20 RX ORDER — SODIUM CHLORIDE 0.9 % (FLUSH) 0.9 %
10 SYRINGE (ML) INJECTION EVERY 12 HOURS SCHEDULED
Status: DISCONTINUED | OUTPATIENT
Start: 2017-09-20 | End: 2017-09-28 | Stop reason: HOSPADM

## 2017-09-20 RX ORDER — ONDANSETRON 2 MG/ML
4 INJECTION INTRAMUSCULAR; INTRAVENOUS EVERY 6 HOURS PRN
Status: DISCONTINUED | OUTPATIENT
Start: 2017-09-20 | End: 2017-09-28 | Stop reason: HOSPADM

## 2017-09-20 RX ORDER — PROPAFENONE HYDROCHLORIDE 150 MG/1
300 TABLET, FILM COATED ORAL 2 TIMES DAILY
Status: DISCONTINUED | OUTPATIENT
Start: 2017-09-20 | End: 2017-09-28 | Stop reason: HOSPADM

## 2017-09-20 RX ORDER — LOSARTAN POTASSIUM 50 MG/1
50 TABLET ORAL DAILY
Status: DISCONTINUED | OUTPATIENT
Start: 2017-09-20 | End: 2017-09-28 | Stop reason: HOSPADM

## 2017-09-20 RX ORDER — ONDANSETRON 2 MG/ML
INJECTION INTRAMUSCULAR; INTRAVENOUS
Status: COMPLETED
Start: 2017-09-20 | End: 2017-09-20

## 2017-09-20 RX ORDER — SODIUM CHLORIDE 0.9 % (FLUSH) 0.9 %
10 SYRINGE (ML) INJECTION PRN
Status: DISCONTINUED | OUTPATIENT
Start: 2017-09-20 | End: 2017-09-20 | Stop reason: SDUPTHER

## 2017-09-20 RX ORDER — VANCOMYCIN HYDROCHLORIDE 1 G/200ML
1000 INJECTION, SOLUTION INTRAVENOUS ONCE
Status: COMPLETED | OUTPATIENT
Start: 2017-09-20 | End: 2017-09-20

## 2017-09-20 RX ORDER — MORPHINE SULFATE 4 MG/ML
4 INJECTION, SOLUTION INTRAMUSCULAR; INTRAVENOUS ONCE
Status: COMPLETED | OUTPATIENT
Start: 2017-09-20 | End: 2017-09-20

## 2017-09-20 RX ORDER — METHYLPREDNISOLONE SODIUM SUCCINATE 40 MG/ML
40 INJECTION, POWDER, LYOPHILIZED, FOR SOLUTION INTRAMUSCULAR; INTRAVENOUS EVERY 12 HOURS
Status: DISCONTINUED | OUTPATIENT
Start: 2017-09-20 | End: 2017-09-21

## 2017-09-20 RX ORDER — IPRATROPIUM BROMIDE AND ALBUTEROL SULFATE 2.5; .5 MG/3ML; MG/3ML
1 SOLUTION RESPIRATORY (INHALATION) ONCE
Status: DISCONTINUED | OUTPATIENT
Start: 2017-09-20 | End: 2017-09-20

## 2017-09-20 RX ORDER — HYDROCODONE BITARTRATE AND ACETAMINOPHEN 5; 325 MG/1; MG/1
1 TABLET ORAL EVERY 4 HOURS PRN
Status: DISCONTINUED | OUTPATIENT
Start: 2017-09-20 | End: 2017-09-28 | Stop reason: HOSPADM

## 2017-09-20 RX ADMIN — VANCOMYCIN HYDROCHLORIDE 1000 MG: 1 INJECTION, SOLUTION INTRAVENOUS at 17:37

## 2017-09-20 RX ADMIN — METHYLPREDNISOLONE SODIUM SUCCINATE 40 MG: 40 INJECTION, POWDER, FOR SOLUTION INTRAMUSCULAR; INTRAVENOUS at 17:37

## 2017-09-20 RX ADMIN — IPRATROPIUM BROMIDE AND ALBUTEROL SULFATE 1 AMPULE: .5; 3 SOLUTION RESPIRATORY (INHALATION) at 14:15

## 2017-09-20 RX ADMIN — PROPAFENONE HYDROCHLORIDE 300 MG: 150 TABLET, FILM COATED ORAL at 20:41

## 2017-09-20 RX ADMIN — METFORMIN HYDROCHLORIDE 1000 MG: 500 TABLET, FILM COATED ORAL at 17:32

## 2017-09-20 RX ADMIN — Medication 10 ML: at 14:01

## 2017-09-20 RX ADMIN — ONDANSETRON 4 MG: 2 INJECTION INTRAMUSCULAR; INTRAVENOUS at 10:16

## 2017-09-20 RX ADMIN — MORPHINE SULFATE 4 MG: 4 INJECTION, SOLUTION INTRAMUSCULAR; INTRAVENOUS at 08:07

## 2017-09-20 RX ADMIN — PROPAFENONE HYDROCHLORIDE 300 MG: 150 TABLET, FILM COATED ORAL at 13:09

## 2017-09-20 RX ADMIN — TAZOBACTAM SODIUM AND PIPERACILLIN SODIUM 3.38 G: 375; 3 INJECTION, SOLUTION INTRAVENOUS at 22:02

## 2017-09-20 RX ADMIN — IPRATROPIUM BROMIDE AND ALBUTEROL SULFATE 1 AMPULE: .5; 3 SOLUTION RESPIRATORY (INHALATION) at 07:25

## 2017-09-20 RX ADMIN — GUAIFENESIN 1200 MG: 600 TABLET, EXTENDED RELEASE ORAL at 20:41

## 2017-09-20 RX ADMIN — METFORMIN HYDROCHLORIDE 1000 MG: 500 TABLET, FILM COATED ORAL at 13:09

## 2017-09-20 RX ADMIN — METHYLPREDNISOLONE SODIUM SUCCINATE 125 MG: 125 INJECTION, POWDER, FOR SOLUTION INTRAMUSCULAR; INTRAVENOUS at 07:35

## 2017-09-20 RX ADMIN — IPRATROPIUM BROMIDE AND ALBUTEROL SULFATE 1 AMPULE: .5; 3 SOLUTION RESPIRATORY (INHALATION) at 19:26

## 2017-09-20 RX ADMIN — LEVOFLOXACIN 750 MG: 5 INJECTION, SOLUTION INTRAVENOUS at 07:35

## 2017-09-20 RX ADMIN — TUBERCULIN PURIFIED PROTEIN DERIVATIVE 5 UNITS: 5 INJECTION, SOLUTION INTRADERMAL at 18:11

## 2017-09-20 RX ADMIN — IPRATROPIUM BROMIDE AND ALBUTEROL SULFATE 1 AMPULE: .5; 3 SOLUTION RESPIRATORY (INHALATION) at 10:58

## 2017-09-20 RX ADMIN — TAZOBACTAM SODIUM AND PIPERACILLIN SODIUM 3.38 G: 375; 3 INJECTION, SOLUTION INTRAVENOUS at 13:57

## 2017-09-20 RX ADMIN — Medication 10 ML: at 20:41

## 2017-09-20 RX ADMIN — ENOXAPARIN SODIUM 40 MG: 40 INJECTION SUBCUTANEOUS at 12:59

## 2017-09-20 ASSESSMENT — ENCOUNTER SYMPTOMS
SORE THROAT: 0
SHORTNESS OF BREATH: 1
COUGH: 1
WHEEZING: 1
HEMOPTYSIS: 1
CONSTIPATION: 0
VOMITING: 0
ABDOMINAL PAIN: 0
WHEEZING: 0
BLURRED VISION: 0
EYE DISCHARGE: 0
BACK PAIN: 0
DIARRHEA: 0
SPUTUM PRODUCTION: 1
NAUSEA: 0
DOUBLE VISION: 0

## 2017-09-20 ASSESSMENT — PAIN SCALES - GENERAL
PAINLEVEL_OUTOF10: 6
PAINLEVEL_OUTOF10: 6

## 2017-09-21 ENCOUNTER — APPOINTMENT (OUTPATIENT)
Dept: GENERAL RADIOLOGY | Age: 62
DRG: 871 | End: 2017-09-21
Payer: COMMERCIAL

## 2017-09-21 PROBLEM — R78.81 BACTEREMIA DUE TO GRAM-NEGATIVE BACTERIA: Status: ACTIVE | Noted: 2017-09-21

## 2017-09-21 LAB
ALBUMIN SERPL-MCNC: 3.2 G/DL (ref 3.5–5.2)
ALP BLD-CCNC: 55 U/L (ref 40–130)
ALT SERPL-CCNC: 17 U/L (ref 5–41)
ANION GAP SERPL CALCULATED.3IONS-SCNC: 16 MMOL/L (ref 7–19)
ANISOCYTOSIS: ABNORMAL
AST SERPL-CCNC: 8 U/L (ref 5–40)
BASOPHILS ABSOLUTE: 0 K/UL (ref 0–0.2)
BASOPHILS MANUAL: 0 %
BASOPHILS RELATIVE PERCENT: 0 % (ref 0–1)
BILIRUB SERPL-MCNC: 0.6 MG/DL (ref 0.2–1.2)
BUN BLDV-MCNC: 25 MG/DL (ref 8–23)
CALCIUM SERPL-MCNC: 9.1 MG/DL (ref 8.8–10.2)
CHLORIDE BLD-SCNC: 101 MMOL/L (ref 98–111)
CO2: 24 MMOL/L (ref 22–29)
CREAT SERPL-MCNC: 1 MG/DL (ref 0.5–1.2)
EOSINOPHILS ABSOLUTE: 0 K/UL (ref 0–0.6)
EOSINOPHILS RELATIVE PERCENT: 0 % (ref 0–5)
GFR NON-AFRICAN AMERICAN: >60
GLUCOSE BLD-MCNC: 178 MG/DL (ref 70–99)
GLUCOSE BLD-MCNC: 206 MG/DL (ref 70–99)
GLUCOSE BLD-MCNC: 208 MG/DL (ref 70–99)
GLUCOSE BLD-MCNC: 257 MG/DL (ref 70–99)
GLUCOSE BLD-MCNC: 259 MG/DL (ref 74–109)
HBA1C MFR BLD: 8.8 %
HCT VFR BLD CALC: 40.2 % (ref 42–52)
HEMOGLOBIN: 12.4 G/DL (ref 14–18)
LYMPHOCYTES ABSOLUTE: 1.5 K/UL (ref 1.1–4.5)
LYMPHOCYTES RELATIVE PERCENT: 10 % (ref 20–40)
MCH RBC QN AUTO: 26.7 PG (ref 27–31)
MCHC RBC AUTO-ENTMCNC: 30.8 G/DL (ref 33–37)
MCV RBC AUTO: 86.5 FL (ref 80–94)
MICROCYTES: ABNORMAL
MONOCYTES ABSOLUTE: 1.2 K/UL (ref 0–0.9)
MONOCYTES RELATIVE PERCENT: 8 % (ref 0–10)
NEUTROPHILS ABSOLUTE: 12.5 K/UL (ref 1.5–7.5)
NEUTROPHILS MANUAL: 82 %
NEUTROPHILS RELATIVE PERCENT: 82 % (ref 50–65)
PDW BLD-RTO: 15.5 % (ref 11.5–14.5)
PERFORMED ON: ABNORMAL
PLATELET # BLD: 107 K/UL (ref 130–400)
PLATELET SLIDE REVIEW: ADEQUATE
PMV BLD AUTO: 12 FL (ref 9.4–12.4)
POTASSIUM SERPL-SCNC: 4.3 MMOL/L (ref 3.5–5)
RBC # BLD: 4.65 M/UL (ref 4.7–6.1)
SODIUM BLD-SCNC: 141 MMOL/L (ref 136–145)
STREP PNEUMONIAE ANTIGEN, URINE: NORMAL
TOTAL PROTEIN: 5.9 G/DL (ref 6.6–8.7)
WBC # BLD: 15.3 K/UL (ref 4.8–10.8)

## 2017-09-21 PROCEDURE — 85025 COMPLETE CBC W/AUTO DIFF WBC: CPT

## 2017-09-21 PROCEDURE — 2580000003 HC RX 258: Performed by: FAMILY MEDICINE

## 2017-09-21 PROCEDURE — 36415 COLL VENOUS BLD VENIPUNCTURE: CPT

## 2017-09-21 PROCEDURE — 6370000000 HC RX 637 (ALT 250 FOR IP): Performed by: FAMILY MEDICINE

## 2017-09-21 PROCEDURE — 71020 XR CHEST STANDARD TWO VW: CPT

## 2017-09-21 PROCEDURE — 94668 MNPJ CHEST WALL SBSQ: CPT

## 2017-09-21 PROCEDURE — 6360000002 HC RX W HCPCS: Performed by: NURSE PRACTITIONER

## 2017-09-21 PROCEDURE — 94640 AIRWAY INHALATION TREATMENT: CPT

## 2017-09-21 PROCEDURE — 6360000002 HC RX W HCPCS: Performed by: FAMILY MEDICINE

## 2017-09-21 PROCEDURE — 2500000003 HC RX 250 WO HCPCS: Performed by: FAMILY MEDICINE

## 2017-09-21 PROCEDURE — 80053 COMPREHEN METABOLIC PANEL: CPT

## 2017-09-21 PROCEDURE — 82948 REAGENT STRIP/BLOOD GLUCOSE: CPT

## 2017-09-21 PROCEDURE — 6360000002 HC RX W HCPCS: Performed by: INTERNAL MEDICINE

## 2017-09-21 PROCEDURE — 1210000000 HC MED SURG R&B

## 2017-09-21 PROCEDURE — 83036 HEMOGLOBIN GLYCOSYLATED A1C: CPT

## 2017-09-21 PROCEDURE — 6370000000 HC RX 637 (ALT 250 FOR IP): Performed by: NURSE PRACTITIONER

## 2017-09-21 RX ORDER — DEXTROSE MONOHYDRATE 50 MG/ML
100 INJECTION, SOLUTION INTRAVENOUS PRN
Status: DISCONTINUED | OUTPATIENT
Start: 2017-09-21 | End: 2017-09-28 | Stop reason: HOSPADM

## 2017-09-21 RX ORDER — NICOTINE POLACRILEX 4 MG
15 LOZENGE BUCCAL PRN
Status: DISCONTINUED | OUTPATIENT
Start: 2017-09-21 | End: 2017-09-28 | Stop reason: HOSPADM

## 2017-09-21 RX ORDER — LEVOFLOXACIN 5 MG/ML
750 INJECTION, SOLUTION INTRAVENOUS EVERY 24 HOURS
Status: DISCONTINUED | OUTPATIENT
Start: 2017-09-21 | End: 2017-09-23

## 2017-09-21 RX ORDER — DEXTROSE MONOHYDRATE 25 G/50ML
12.5 INJECTION, SOLUTION INTRAVENOUS PRN
Status: DISCONTINUED | OUTPATIENT
Start: 2017-09-21 | End: 2017-09-28 | Stop reason: HOSPADM

## 2017-09-21 RX ORDER — METHYLPREDNISOLONE SODIUM SUCCINATE 40 MG/ML
20 INJECTION, POWDER, LYOPHILIZED, FOR SOLUTION INTRAMUSCULAR; INTRAVENOUS EVERY 12 HOURS
Status: COMPLETED | OUTPATIENT
Start: 2017-09-21 | End: 2017-09-22

## 2017-09-21 RX ADMIN — TAZOBACTAM SODIUM AND PIPERACILLIN SODIUM 3.38 G: 375; 3 INJECTION, SOLUTION INTRAVENOUS at 06:00

## 2017-09-21 RX ADMIN — ENOXAPARIN SODIUM 40 MG: 40 INJECTION SUBCUTANEOUS at 08:48

## 2017-09-21 RX ADMIN — METHYLPREDNISOLONE SODIUM SUCCINATE 20 MG: 40 INJECTION, POWDER, FOR SOLUTION INTRAMUSCULAR; INTRAVENOUS at 18:20

## 2017-09-21 RX ADMIN — ASPIRIN 81 MG: 81 TABLET, COATED ORAL at 08:49

## 2017-09-21 RX ADMIN — INSULIN LISPRO 2 UNITS: 100 INJECTION, SOLUTION INTRAVENOUS; SUBCUTANEOUS at 11:33

## 2017-09-21 RX ADMIN — PROPAFENONE HYDROCHLORIDE 300 MG: 150 TABLET, FILM COATED ORAL at 08:49

## 2017-09-21 RX ADMIN — METFORMIN HYDROCHLORIDE 1000 MG: 500 TABLET, FILM COATED ORAL at 08:49

## 2017-09-21 RX ADMIN — GUAIFENESIN 1200 MG: 600 TABLET, EXTENDED RELEASE ORAL at 21:23

## 2017-09-21 RX ADMIN — PROPAFENONE HYDROCHLORIDE 300 MG: 150 TABLET, FILM COATED ORAL at 21:23

## 2017-09-21 RX ADMIN — ATORVASTATIN CALCIUM 40 MG: 40 TABLET, FILM COATED ORAL at 08:49

## 2017-09-21 RX ADMIN — IPRATROPIUM BROMIDE AND ALBUTEROL SULFATE 1 AMPULE: .5; 3 SOLUTION RESPIRATORY (INHALATION) at 06:48

## 2017-09-21 RX ADMIN — INSULIN LISPRO 2 UNITS: 100 INJECTION, SOLUTION INTRAVENOUS; SUBCUTANEOUS at 18:20

## 2017-09-21 RX ADMIN — METFORMIN HYDROCHLORIDE 1000 MG: 500 TABLET, FILM COATED ORAL at 18:19

## 2017-09-21 RX ADMIN — METHYLPREDNISOLONE SODIUM SUCCINATE 40 MG: 40 INJECTION, POWDER, FOR SOLUTION INTRAMUSCULAR; INTRAVENOUS at 06:03

## 2017-09-21 RX ADMIN — TAZOBACTAM SODIUM AND PIPERACILLIN SODIUM 3.38 G: 375; 3 INJECTION, SOLUTION INTRAVENOUS at 16:13

## 2017-09-21 RX ADMIN — INSULIN LISPRO 2 UNITS: 100 INJECTION, SOLUTION INTRAVENOUS; SUBCUTANEOUS at 21:24

## 2017-09-21 RX ADMIN — IPRATROPIUM BROMIDE AND ALBUTEROL SULFATE 1 AMPULE: .5; 3 SOLUTION RESPIRATORY (INHALATION) at 15:23

## 2017-09-21 RX ADMIN — GUAIFENESIN 1200 MG: 600 TABLET, EXTENDED RELEASE ORAL at 08:49

## 2017-09-21 RX ADMIN — Medication 10 ML: at 21:23

## 2017-09-21 RX ADMIN — IPRATROPIUM BROMIDE AND ALBUTEROL SULFATE 1 AMPULE: .5; 3 SOLUTION RESPIRATORY (INHALATION) at 11:08

## 2017-09-21 RX ADMIN — LEVOFLOXACIN 750 MG: 5 INJECTION, SOLUTION INTRAVENOUS at 11:26

## 2017-09-21 RX ADMIN — TAZOBACTAM SODIUM AND PIPERACILLIN SODIUM 3.38 G: 375; 3 INJECTION, SOLUTION INTRAVENOUS at 21:27

## 2017-09-21 RX ADMIN — IPRATROPIUM BROMIDE AND ALBUTEROL SULFATE 1 AMPULE: .5; 3 SOLUTION RESPIRATORY (INHALATION) at 20:39

## 2017-09-21 RX ADMIN — LOSARTAN POTASSIUM 50 MG: 50 TABLET ORAL at 08:49

## 2017-09-21 ASSESSMENT — ENCOUNTER SYMPTOMS
SPUTUM PRODUCTION: 0
VOMITING: 0
DIARRHEA: 0
NAUSEA: 0

## 2017-09-22 LAB
ALBUMIN SERPL-MCNC: 2.9 G/DL (ref 3.5–5.2)
ALP BLD-CCNC: 55 U/L (ref 40–130)
ALT SERPL-CCNC: 13 U/L (ref 5–41)
ANION GAP SERPL CALCULATED.3IONS-SCNC: 12 MMOL/L (ref 7–19)
ANISOCYTOSIS: ABNORMAL
AST SERPL-CCNC: 6 U/L (ref 5–40)
BANDED NEUTROPHILS RELATIVE PERCENT: 12 % (ref 0–5)
BASOPHILS ABSOLUTE: 0 K/UL (ref 0–0.2)
BASOPHILS MANUAL: 0 %
BASOPHILS RELATIVE PERCENT: 0 % (ref 0–1)
BILIRUB SERPL-MCNC: 0.4 MG/DL (ref 0.2–1.2)
BUN BLDV-MCNC: 24 MG/DL (ref 8–23)
CALCIUM SERPL-MCNC: 9.2 MG/DL (ref 8.8–10.2)
CHLORIDE BLD-SCNC: 102 MMOL/L (ref 98–111)
CO2: 24 MMOL/L (ref 22–29)
CREAT SERPL-MCNC: 0.7 MG/DL (ref 0.5–1.2)
CULTURE, RESPIRATORY: NORMAL
EOSINOPHILS ABSOLUTE: 0 K/UL (ref 0–0.6)
EOSINOPHILS RELATIVE PERCENT: 0 % (ref 0–5)
GFR NON-AFRICAN AMERICAN: >60
GLUCOSE BLD-MCNC: 216 MG/DL (ref 70–99)
GLUCOSE BLD-MCNC: 226 MG/DL (ref 70–99)
GLUCOSE BLD-MCNC: 236 MG/DL (ref 74–109)
GLUCOSE BLD-MCNC: 251 MG/DL (ref 70–99)
GLUCOSE BLD-MCNC: 256 MG/DL (ref 70–99)
GRAM STAIN RESULT: NORMAL
HCT VFR BLD CALC: 38.2 % (ref 42–52)
HEMOGLOBIN: 11.9 G/DL (ref 14–18)
LYMPHOCYTES ABSOLUTE: 2 K/UL (ref 1.1–4.5)
LYMPHOCYTES RELATIVE PERCENT: 14 % (ref 20–40)
MCH RBC QN AUTO: 26.8 PG (ref 27–31)
MCHC RBC AUTO-ENTMCNC: 31.2 G/DL (ref 33–37)
MCV RBC AUTO: 86 FL (ref 80–94)
MONOCYTES ABSOLUTE: 0.4 K/UL (ref 0–0.9)
MONOCYTES RELATIVE PERCENT: 3 % (ref 0–10)
MYELOCYTE PERCENT: 1 %
NEUTROPHILS ABSOLUTE: 11.6 K/UL (ref 1.5–7.5)
NEUTROPHILS MANUAL: 70 %
NEUTROPHILS RELATIVE PERCENT: 70 % (ref 50–65)
PDW BLD-RTO: 15.4 % (ref 11.5–14.5)
PERFORMED ON: ABNORMAL
PLATELET # BLD: 119 K/UL (ref 130–400)
PLATELET SLIDE REVIEW: ADEQUATE
PMV BLD AUTO: 12.8 FL (ref 9.4–12.4)
POTASSIUM SERPL-SCNC: 4.5 MMOL/L (ref 3.5–5)
RBC # BLD: 4.44 M/UL (ref 4.7–6.1)
SODIUM BLD-SCNC: 138 MMOL/L (ref 136–145)
TOTAL PROTEIN: 5.9 G/DL (ref 6.6–8.7)
WBC # BLD: 14 K/UL (ref 4.8–10.8)

## 2017-09-22 PROCEDURE — 82948 REAGENT STRIP/BLOOD GLUCOSE: CPT

## 2017-09-22 PROCEDURE — 2500000003 HC RX 250 WO HCPCS: Performed by: FAMILY MEDICINE

## 2017-09-22 PROCEDURE — 36415 COLL VENOUS BLD VENIPUNCTURE: CPT

## 2017-09-22 PROCEDURE — 6360000002 HC RX W HCPCS: Performed by: INTERNAL MEDICINE

## 2017-09-22 PROCEDURE — 6370000000 HC RX 637 (ALT 250 FOR IP): Performed by: FAMILY MEDICINE

## 2017-09-22 PROCEDURE — 94640 AIRWAY INHALATION TREATMENT: CPT

## 2017-09-22 PROCEDURE — 94668 MNPJ CHEST WALL SBSQ: CPT

## 2017-09-22 PROCEDURE — 6360000002 HC RX W HCPCS: Performed by: FAMILY MEDICINE

## 2017-09-22 PROCEDURE — 85025 COMPLETE CBC W/AUTO DIFF WBC: CPT

## 2017-09-22 PROCEDURE — 2580000003 HC RX 258: Performed by: FAMILY MEDICINE

## 2017-09-22 PROCEDURE — 80053 COMPREHEN METABOLIC PANEL: CPT

## 2017-09-22 PROCEDURE — 6370000000 HC RX 637 (ALT 250 FOR IP): Performed by: NURSE PRACTITIONER

## 2017-09-22 PROCEDURE — 1210000000 HC MED SURG R&B

## 2017-09-22 PROCEDURE — 36600 WITHDRAWAL OF ARTERIAL BLOOD: CPT

## 2017-09-22 RX ORDER — HYDROCODONE POLISTIREX AND CHLORPHENIRAMINE POLISTIREX 10; 8 MG/5ML; MG/5ML
5 SUSPENSION, EXTENDED RELEASE ORAL EVERY 12 HOURS PRN
Status: DISCONTINUED | OUTPATIENT
Start: 2017-09-22 | End: 2017-09-28 | Stop reason: HOSPADM

## 2017-09-22 RX ADMIN — IPRATROPIUM BROMIDE AND ALBUTEROL SULFATE 1 AMPULE: .5; 3 SOLUTION RESPIRATORY (INHALATION) at 11:57

## 2017-09-22 RX ADMIN — METFORMIN HYDROCHLORIDE 1000 MG: 500 TABLET, FILM COATED ORAL at 17:10

## 2017-09-22 RX ADMIN — INSULIN LISPRO 2 UNITS: 100 INJECTION, SOLUTION INTRAVENOUS; SUBCUTANEOUS at 17:11

## 2017-09-22 RX ADMIN — LOSARTAN POTASSIUM 50 MG: 50 TABLET ORAL at 02:00

## 2017-09-22 RX ADMIN — Medication 10 ML: at 10:10

## 2017-09-22 RX ADMIN — METHYLPREDNISOLONE SODIUM SUCCINATE 20 MG: 40 INJECTION, POWDER, FOR SOLUTION INTRAMUSCULAR; INTRAVENOUS at 05:54

## 2017-09-22 RX ADMIN — ASPIRIN 81 MG: 81 TABLET, COATED ORAL at 10:01

## 2017-09-22 RX ADMIN — IPRATROPIUM BROMIDE AND ALBUTEROL SULFATE 1 AMPULE: .5; 3 SOLUTION RESPIRATORY (INHALATION) at 15:44

## 2017-09-22 RX ADMIN — TAZOBACTAM SODIUM AND PIPERACILLIN SODIUM 3.38 G: 375; 3 INJECTION, SOLUTION INTRAVENOUS at 21:37

## 2017-09-22 RX ADMIN — PROPAFENONE HYDROCHLORIDE 300 MG: 150 TABLET, FILM COATED ORAL at 10:01

## 2017-09-22 RX ADMIN — INSULIN LISPRO 3 UNITS: 100 INJECTION, SOLUTION INTRAVENOUS; SUBCUTANEOUS at 12:23

## 2017-09-22 RX ADMIN — IPRATROPIUM BROMIDE AND ALBUTEROL SULFATE 1 AMPULE: .5; 3 SOLUTION RESPIRATORY (INHALATION) at 21:55

## 2017-09-22 RX ADMIN — TAZOBACTAM SODIUM AND PIPERACILLIN SODIUM 3.38 G: 375; 3 INJECTION, SOLUTION INTRAVENOUS at 05:54

## 2017-09-22 RX ADMIN — INSULIN LISPRO 1 UNITS: 100 INJECTION, SOLUTION INTRAVENOUS; SUBCUTANEOUS at 21:38

## 2017-09-22 RX ADMIN — Medication 5 ML: at 09:59

## 2017-09-22 RX ADMIN — Medication 10 ML: at 21:37

## 2017-09-22 RX ADMIN — LEVOFLOXACIN 750 MG: 5 INJECTION, SOLUTION INTRAVENOUS at 10:11

## 2017-09-22 RX ADMIN — Medication 5 ML: at 21:43

## 2017-09-22 RX ADMIN — GUAIFENESIN 1200 MG: 600 TABLET, EXTENDED RELEASE ORAL at 10:00

## 2017-09-22 RX ADMIN — PROPAFENONE HYDROCHLORIDE 300 MG: 150 TABLET, FILM COATED ORAL at 21:37

## 2017-09-22 RX ADMIN — IPRATROPIUM BROMIDE AND ALBUTEROL SULFATE 1 AMPULE: .5; 3 SOLUTION RESPIRATORY (INHALATION) at 07:58

## 2017-09-22 RX ADMIN — TAZOBACTAM SODIUM AND PIPERACILLIN SODIUM 3.38 G: 375; 3 INJECTION, SOLUTION INTRAVENOUS at 13:29

## 2017-09-22 RX ADMIN — ATORVASTATIN CALCIUM 40 MG: 40 TABLET, FILM COATED ORAL at 10:08

## 2017-09-22 RX ADMIN — INSULIN LISPRO 3 UNITS: 100 INJECTION, SOLUTION INTRAVENOUS; SUBCUTANEOUS at 09:39

## 2017-09-22 RX ADMIN — METFORMIN HYDROCHLORIDE 1000 MG: 500 TABLET, FILM COATED ORAL at 10:01

## 2017-09-22 RX ADMIN — ENOXAPARIN SODIUM 40 MG: 40 INJECTION SUBCUTANEOUS at 10:00

## 2017-09-22 RX ADMIN — GUAIFENESIN 1200 MG: 600 TABLET, EXTENDED RELEASE ORAL at 21:37

## 2017-09-22 ASSESSMENT — ENCOUNTER SYMPTOMS
VOMITING: 0
NAUSEA: 0
WHEEZING: 0
COUGH: 1

## 2017-09-23 PROBLEM — A41.3 SEPSIS DUE TO HAEMOPHILUS INFLUENZAE (HCC): Status: ACTIVE | Noted: 2017-09-23

## 2017-09-23 LAB
ALBUMIN SERPL-MCNC: 3.1 G/DL (ref 3.5–5.2)
ALP BLD-CCNC: 61 U/L (ref 40–130)
ALT SERPL-CCNC: 12 U/L (ref 5–41)
ANION GAP SERPL CALCULATED.3IONS-SCNC: 11 MMOL/L (ref 7–19)
AST SERPL-CCNC: 7 U/L (ref 5–40)
BASOPHILS ABSOLUTE: 0.1 K/UL (ref 0–0.2)
BASOPHILS RELATIVE PERCENT: 0.3 % (ref 0–1)
BILIRUB SERPL-MCNC: 0.3 MG/DL (ref 0.2–1.2)
BLOOD CULTURE, ROUTINE: ABNORMAL
BUN BLDV-MCNC: 22 MG/DL (ref 8–23)
CALCIUM SERPL-MCNC: 8.8 MG/DL (ref 8.8–10.2)
CHLORIDE BLD-SCNC: 102 MMOL/L (ref 98–111)
CO2: 27 MMOL/L (ref 22–29)
CREAT SERPL-MCNC: 0.7 MG/DL (ref 0.5–1.2)
CULTURE, BLOOD 2: ABNORMAL
EOSINOPHILS ABSOLUTE: 0.1 K/UL (ref 0–0.6)
EOSINOPHILS RELATIVE PERCENT: 0.7 % (ref 0–5)
GFR NON-AFRICAN AMERICAN: >60
GLUCOSE BLD-MCNC: 116 MG/DL (ref 70–99)
GLUCOSE BLD-MCNC: 138 MG/DL (ref 70–99)
GLUCOSE BLD-MCNC: 144 MG/DL (ref 70–99)
GLUCOSE BLD-MCNC: 195 MG/DL (ref 70–99)
GLUCOSE BLD-MCNC: 214 MG/DL (ref 74–109)
HCT VFR BLD CALC: 40.2 % (ref 42–52)
HEMOGLOBIN: 12.4 G/DL (ref 14–18)
HISTOPLASMA ANTIGEN URINE INTERP: NOT DETECTED
HISTOPLASMA ANTIGEN URINE: NOT DETECTED NG/ML
L. PNEUMOPHILA SEROGP 1 UR AG: NEGATIVE
LYMPHOCYTES ABSOLUTE: 2.7 K/UL (ref 1.1–4.5)
LYMPHOCYTES RELATIVE PERCENT: 18.6 % (ref 20–40)
MCH RBC QN AUTO: 26.8 PG (ref 27–31)
MCHC RBC AUTO-ENTMCNC: 30.8 G/DL (ref 33–37)
MCV RBC AUTO: 86.8 FL (ref 80–94)
MONOCYTES ABSOLUTE: 0.8 K/UL (ref 0–0.9)
MONOCYTES RELATIVE PERCENT: 5.6 % (ref 0–10)
NEUTROPHILS ABSOLUTE: 10.4 K/UL (ref 1.5–7.5)
NEUTROPHILS RELATIVE PERCENT: 71.9 % (ref 50–65)
ORGANISM: ABNORMAL
ORGANISM: ABNORMAL
PDW BLD-RTO: 15.4 % (ref 11.5–14.5)
PERFORMED ON: ABNORMAL
PLATELET # BLD: 125 K/UL (ref 130–400)
PMV BLD AUTO: 12 FL (ref 9.4–12.4)
POTASSIUM SERPL-SCNC: 4.3 MMOL/L (ref 3.5–5)
RBC # BLD: 4.63 M/UL (ref 4.7–6.1)
SODIUM BLD-SCNC: 140 MMOL/L (ref 136–145)
TOTAL PROTEIN: 5.4 G/DL (ref 6.6–8.7)
WBC # BLD: 14.5 K/UL (ref 4.8–10.8)

## 2017-09-23 PROCEDURE — 82948 REAGENT STRIP/BLOOD GLUCOSE: CPT

## 2017-09-23 PROCEDURE — 6360000002 HC RX W HCPCS: Performed by: INTERNAL MEDICINE

## 2017-09-23 PROCEDURE — 2500000003 HC RX 250 WO HCPCS: Performed by: FAMILY MEDICINE

## 2017-09-23 PROCEDURE — 80053 COMPREHEN METABOLIC PANEL: CPT

## 2017-09-23 PROCEDURE — 6360000002 HC RX W HCPCS: Performed by: FAMILY MEDICINE

## 2017-09-23 PROCEDURE — 6370000000 HC RX 637 (ALT 250 FOR IP): Performed by: NURSE PRACTITIONER

## 2017-09-23 PROCEDURE — 94640 AIRWAY INHALATION TREATMENT: CPT

## 2017-09-23 PROCEDURE — 94668 MNPJ CHEST WALL SBSQ: CPT

## 2017-09-23 PROCEDURE — 2580000003 HC RX 258: Performed by: FAMILY MEDICINE

## 2017-09-23 PROCEDURE — 6370000000 HC RX 637 (ALT 250 FOR IP): Performed by: FAMILY MEDICINE

## 2017-09-23 PROCEDURE — 36415 COLL VENOUS BLD VENIPUNCTURE: CPT

## 2017-09-23 PROCEDURE — 1210000000 HC MED SURG R&B

## 2017-09-23 PROCEDURE — 85025 COMPLETE CBC W/AUTO DIFF WBC: CPT

## 2017-09-23 PROCEDURE — 2580000003 HC RX 258: Performed by: INTERNAL MEDICINE

## 2017-09-23 RX ADMIN — ATORVASTATIN CALCIUM 40 MG: 40 TABLET, FILM COATED ORAL at 08:53

## 2017-09-23 RX ADMIN — PROPAFENONE HYDROCHLORIDE 300 MG: 150 TABLET, FILM COATED ORAL at 22:38

## 2017-09-23 RX ADMIN — IPRATROPIUM BROMIDE AND ALBUTEROL SULFATE 1 AMPULE: .5; 3 SOLUTION RESPIRATORY (INHALATION) at 14:35

## 2017-09-23 RX ADMIN — METFORMIN HYDROCHLORIDE 1000 MG: 500 TABLET, FILM COATED ORAL at 19:05

## 2017-09-23 RX ADMIN — ENOXAPARIN SODIUM 40 MG: 40 INJECTION SUBCUTANEOUS at 08:53

## 2017-09-23 RX ADMIN — IPRATROPIUM BROMIDE AND ALBUTEROL SULFATE 1 AMPULE: .5; 3 SOLUTION RESPIRATORY (INHALATION) at 10:59

## 2017-09-23 RX ADMIN — LOSARTAN POTASSIUM 50 MG: 50 TABLET ORAL at 08:53

## 2017-09-23 RX ADMIN — TAZOBACTAM SODIUM AND PIPERACILLIN SODIUM 3.38 G: 375; 3 INJECTION, SOLUTION INTRAVENOUS at 05:55

## 2017-09-23 RX ADMIN — GUAIFENESIN 1200 MG: 600 TABLET, EXTENDED RELEASE ORAL at 08:53

## 2017-09-23 RX ADMIN — METFORMIN HYDROCHLORIDE 1000 MG: 500 TABLET, FILM COATED ORAL at 08:53

## 2017-09-23 RX ADMIN — Medication 5 ML: at 22:38

## 2017-09-23 RX ADMIN — PROPAFENONE HYDROCHLORIDE 300 MG: 150 TABLET, FILM COATED ORAL at 08:53

## 2017-09-23 RX ADMIN — Medication 10 ML: at 22:38

## 2017-09-23 RX ADMIN — GUAIFENESIN 1200 MG: 600 TABLET, EXTENDED RELEASE ORAL at 22:38

## 2017-09-23 RX ADMIN — IPRATROPIUM BROMIDE AND ALBUTEROL SULFATE 1 AMPULE: .5; 3 SOLUTION RESPIRATORY (INHALATION) at 07:32

## 2017-09-23 RX ADMIN — HYDROCODONE BITARTRATE AND ACETAMINOPHEN 2 TABLET: 5; 325 TABLET ORAL at 19:05

## 2017-09-23 RX ADMIN — CEFTRIAXONE SODIUM 1 G: 1 INJECTION, POWDER, FOR SOLUTION INTRAMUSCULAR; INTRAVENOUS at 12:53

## 2017-09-23 RX ADMIN — ASPIRIN 81 MG: 81 TABLET, COATED ORAL at 08:53

## 2017-09-23 RX ADMIN — IPRATROPIUM BROMIDE AND ALBUTEROL SULFATE 1 AMPULE: .5; 3 SOLUTION RESPIRATORY (INHALATION) at 19:42

## 2017-09-23 ASSESSMENT — PAIN SCALES - GENERAL
PAINLEVEL_OUTOF10: 3
PAINLEVEL_OUTOF10: 6

## 2017-09-23 ASSESSMENT — ENCOUNTER SYMPTOMS
COUGH: 1
NAUSEA: 0
VOMITING: 0
WHEEZING: 0

## 2017-09-24 LAB
ANION GAP SERPL CALCULATED.3IONS-SCNC: 13 MMOL/L (ref 7–19)
ANISOCYTOSIS: ABNORMAL
ATYPICAL LYMPHOCYTE RELATIVE PERCENT: 2 % (ref 0–8)
BASOPHILS ABSOLUTE: 0 K/UL (ref 0–0.2)
BASOPHILS MANUAL: 0 %
BASOPHILS RELATIVE PERCENT: 0 % (ref 0–1)
BUN BLDV-MCNC: 20 MG/DL (ref 8–23)
CALCIUM SERPL-MCNC: 8.8 MG/DL (ref 8.8–10.2)
CHLORIDE BLD-SCNC: 100 MMOL/L (ref 98–111)
CO2: 26 MMOL/L (ref 22–29)
CREAT SERPL-MCNC: 0.8 MG/DL (ref 0.5–1.2)
EOSINOPHILS ABSOLUTE: 0.25 K/UL (ref 0–0.6)
EOSINOPHILS RELATIVE PERCENT: 2 % (ref 0–5)
GFR NON-AFRICAN AMERICAN: >60
GLUCOSE BLD-MCNC: 123 MG/DL (ref 70–99)
GLUCOSE BLD-MCNC: 131 MG/DL (ref 70–99)
GLUCOSE BLD-MCNC: 155 MG/DL (ref 74–109)
GLUCOSE BLD-MCNC: 173 MG/DL (ref 70–99)
GLUCOSE BLD-MCNC: 222 MG/DL (ref 70–99)
HCT VFR BLD CALC: 39.8 % (ref 42–52)
HEMOGLOBIN: 12.6 G/DL (ref 14–18)
HYPOCHROMIA: ABNORMAL
IGA: 110 MG/DL (ref 70–400)
LYMPHOCYTES ABSOLUTE: 6.4 K/UL (ref 1.1–4.5)
LYMPHOCYTES RELATIVE PERCENT: 49 % (ref 20–40)
MCH RBC QN AUTO: 27 PG (ref 27–31)
MCHC RBC AUTO-ENTMCNC: 31.7 G/DL (ref 33–37)
MCV RBC AUTO: 85.2 FL (ref 80–94)
MONOCYTES ABSOLUTE: 0.4 K/UL (ref 0–0.9)
MONOCYTES RELATIVE PERCENT: 3 % (ref 0–10)
MYELOCYTE PERCENT: 1 %
NEUTROPHILS ABSOLUTE: 5.5 K/UL (ref 1.5–7.5)
NEUTROPHILS MANUAL: 43 %
NEUTROPHILS RELATIVE PERCENT: 43 % (ref 50–65)
PDW BLD-RTO: 15.6 % (ref 11.5–14.5)
PERFORMED ON: ABNORMAL
PLATELET # BLD: 135 K/UL (ref 130–400)
PLATELET SLIDE REVIEW: ADEQUATE
PMV BLD AUTO: 12.1 FL (ref 9.4–12.4)
POLYCHROMASIA: ABNORMAL
POTASSIUM SERPL-SCNC: 3.8 MMOL/L (ref 3.5–5)
RBC # BLD: 4.67 M/UL (ref 4.7–6.1)
SODIUM BLD-SCNC: 139 MMOL/L (ref 136–145)
WBC # BLD: 12.5 K/UL (ref 4.8–10.8)

## 2017-09-24 PROCEDURE — 82948 REAGENT STRIP/BLOOD GLUCOSE: CPT

## 2017-09-24 PROCEDURE — 6360000002 HC RX W HCPCS: Performed by: FAMILY MEDICINE

## 2017-09-24 PROCEDURE — 6370000000 HC RX 637 (ALT 250 FOR IP): Performed by: FAMILY MEDICINE

## 2017-09-24 PROCEDURE — 1210000000 HC MED SURG R&B

## 2017-09-24 PROCEDURE — 2580000003 HC RX 258: Performed by: FAMILY MEDICINE

## 2017-09-24 PROCEDURE — 36415 COLL VENOUS BLD VENIPUNCTURE: CPT

## 2017-09-24 PROCEDURE — 2580000003 HC RX 258: Performed by: INTERNAL MEDICINE

## 2017-09-24 PROCEDURE — 6370000000 HC RX 637 (ALT 250 FOR IP): Performed by: NURSE PRACTITIONER

## 2017-09-24 PROCEDURE — 80048 BASIC METABOLIC PNL TOTAL CA: CPT

## 2017-09-24 PROCEDURE — 94640 AIRWAY INHALATION TREATMENT: CPT

## 2017-09-24 PROCEDURE — 6360000002 HC RX W HCPCS: Performed by: INTERNAL MEDICINE

## 2017-09-24 PROCEDURE — 82784 ASSAY IGA/IGD/IGG/IGM EACH: CPT

## 2017-09-24 PROCEDURE — 94668 MNPJ CHEST WALL SBSQ: CPT

## 2017-09-24 PROCEDURE — 85007 BL SMEAR W/DIFF WBC COUNT: CPT

## 2017-09-24 RX ADMIN — Medication 10 ML: at 20:40

## 2017-09-24 RX ADMIN — GUAIFENESIN 1200 MG: 600 TABLET, EXTENDED RELEASE ORAL at 08:45

## 2017-09-24 RX ADMIN — ENOXAPARIN SODIUM 40 MG: 40 INJECTION SUBCUTANEOUS at 08:49

## 2017-09-24 RX ADMIN — IPRATROPIUM BROMIDE AND ALBUTEROL SULFATE 1 AMPULE: .5; 3 SOLUTION RESPIRATORY (INHALATION) at 10:23

## 2017-09-24 RX ADMIN — Medication 5 ML: at 20:40

## 2017-09-24 RX ADMIN — METFORMIN HYDROCHLORIDE 1000 MG: 500 TABLET, FILM COATED ORAL at 18:21

## 2017-09-24 RX ADMIN — IPRATROPIUM BROMIDE AND ALBUTEROL SULFATE 1 AMPULE: .5; 3 SOLUTION RESPIRATORY (INHALATION) at 19:58

## 2017-09-24 RX ADMIN — Medication 5 ML: at 11:52

## 2017-09-24 RX ADMIN — ASPIRIN 81 MG: 81 TABLET, COATED ORAL at 08:45

## 2017-09-24 RX ADMIN — CEFTRIAXONE SODIUM 1 G: 1 INJECTION, POWDER, FOR SOLUTION INTRAMUSCULAR; INTRAVENOUS at 12:01

## 2017-09-24 RX ADMIN — Medication 10 ML: at 09:00

## 2017-09-24 RX ADMIN — IPRATROPIUM BROMIDE AND ALBUTEROL SULFATE 1 AMPULE: .5; 3 SOLUTION RESPIRATORY (INHALATION) at 14:39

## 2017-09-24 RX ADMIN — GUAIFENESIN 1200 MG: 600 TABLET, EXTENDED RELEASE ORAL at 20:40

## 2017-09-24 RX ADMIN — ATORVASTATIN CALCIUM 40 MG: 40 TABLET, FILM COATED ORAL at 08:45

## 2017-09-24 RX ADMIN — PROPAFENONE HYDROCHLORIDE 300 MG: 150 TABLET, FILM COATED ORAL at 20:40

## 2017-09-24 RX ADMIN — IPRATROPIUM BROMIDE AND ALBUTEROL SULFATE 1 AMPULE: .5; 3 SOLUTION RESPIRATORY (INHALATION) at 06:44

## 2017-09-24 RX ADMIN — LOSARTAN POTASSIUM 50 MG: 50 TABLET ORAL at 08:45

## 2017-09-24 RX ADMIN — METFORMIN HYDROCHLORIDE 1000 MG: 500 TABLET, FILM COATED ORAL at 08:45

## 2017-09-24 RX ADMIN — PROPAFENONE HYDROCHLORIDE 300 MG: 150 TABLET, FILM COATED ORAL at 08:45

## 2017-09-24 ASSESSMENT — ENCOUNTER SYMPTOMS
VOMITING: 0
WHEEZING: 0
COUGH: 1
NAUSEA: 0

## 2017-09-25 ENCOUNTER — APPOINTMENT (OUTPATIENT)
Dept: GENERAL RADIOLOGY | Age: 62
DRG: 871 | End: 2017-09-25
Payer: COMMERCIAL

## 2017-09-25 LAB
ALBUMIN SERPL-MCNC: 3 G/DL (ref 3.5–5.2)
ALP BLD-CCNC: 59 U/L (ref 40–130)
ALT SERPL-CCNC: 25 U/L (ref 5–41)
ANION GAP SERPL CALCULATED.3IONS-SCNC: 11 MMOL/L (ref 7–19)
AST SERPL-CCNC: 16 U/L (ref 5–40)
BASOPHILS ABSOLUTE: 0.1 K/UL (ref 0–0.2)
BASOPHILS RELATIVE PERCENT: 0.5 % (ref 0–1)
BILIRUB SERPL-MCNC: 0.4 MG/DL (ref 0.2–1.2)
BUN BLDV-MCNC: 14 MG/DL (ref 8–23)
CALCIUM SERPL-MCNC: 8.6 MG/DL (ref 8.8–10.2)
CHLORIDE BLD-SCNC: 102 MMOL/L (ref 98–111)
CO2: 27 MMOL/L (ref 22–29)
CREAT SERPL-MCNC: 0.7 MG/DL (ref 0.5–1.2)
EOSINOPHILS ABSOLUTE: 0.3 K/UL (ref 0–0.6)
EOSINOPHILS RELATIVE PERCENT: 2.3 % (ref 0–5)
GFR NON-AFRICAN AMERICAN: >60
GLUCOSE BLD-MCNC: 114 MG/DL (ref 70–99)
GLUCOSE BLD-MCNC: 118 MG/DL (ref 70–99)
GLUCOSE BLD-MCNC: 133 MG/DL (ref 70–99)
GLUCOSE BLD-MCNC: 144 MG/DL (ref 70–99)
GLUCOSE BLD-MCNC: 148 MG/DL (ref 74–109)
HCT VFR BLD CALC: 41.7 % (ref 42–52)
HEMOGLOBIN: 12.8 G/DL (ref 14–18)
LYMPHOCYTES ABSOLUTE: 3.7 K/UL (ref 1.1–4.5)
LYMPHOCYTES RELATIVE PERCENT: 27.9 % (ref 20–40)
MCH RBC QN AUTO: 26.4 PG (ref 27–31)
MCHC RBC AUTO-ENTMCNC: 30.7 G/DL (ref 33–37)
MCV RBC AUTO: 86 FL (ref 80–94)
MONOCYTES ABSOLUTE: 1 K/UL (ref 0–0.9)
MONOCYTES RELATIVE PERCENT: 7.4 % (ref 0–10)
NEUTROPHILS ABSOLUTE: 7.6 K/UL (ref 1.5–7.5)
NEUTROPHILS RELATIVE PERCENT: 57.1 % (ref 50–65)
PDW BLD-RTO: 15.6 % (ref 11.5–14.5)
PERFORMED ON: ABNORMAL
PLATELET # BLD: 142 K/UL (ref 130–400)
PMV BLD AUTO: 11.9 FL (ref 9.4–12.4)
POTASSIUM SERPL-SCNC: 3.9 MMOL/L (ref 3.5–5)
RBC # BLD: 4.85 M/UL (ref 4.7–6.1)
SODIUM BLD-SCNC: 140 MMOL/L (ref 136–145)
TOTAL PROTEIN: 5.4 G/DL (ref 6.6–8.7)
WBC # BLD: 13.3 K/UL (ref 4.8–10.8)

## 2017-09-25 PROCEDURE — 85025 COMPLETE CBC W/AUTO DIFF WBC: CPT

## 2017-09-25 PROCEDURE — 82948 REAGENT STRIP/BLOOD GLUCOSE: CPT

## 2017-09-25 PROCEDURE — 36415 COLL VENOUS BLD VENIPUNCTURE: CPT

## 2017-09-25 PROCEDURE — 6360000002 HC RX W HCPCS: Performed by: INTERNAL MEDICINE

## 2017-09-25 PROCEDURE — 2580000003 HC RX 258: Performed by: FAMILY MEDICINE

## 2017-09-25 PROCEDURE — 94640 AIRWAY INHALATION TREATMENT: CPT

## 2017-09-25 PROCEDURE — 94668 MNPJ CHEST WALL SBSQ: CPT

## 2017-09-25 PROCEDURE — 6370000000 HC RX 637 (ALT 250 FOR IP): Performed by: FAMILY MEDICINE

## 2017-09-25 PROCEDURE — 71020 XR CHEST STANDARD TWO VW: CPT

## 2017-09-25 PROCEDURE — 6370000000 HC RX 637 (ALT 250 FOR IP): Performed by: NURSE PRACTITIONER

## 2017-09-25 PROCEDURE — 2580000003 HC RX 258: Performed by: INTERNAL MEDICINE

## 2017-09-25 PROCEDURE — 80053 COMPREHEN METABOLIC PANEL: CPT

## 2017-09-25 PROCEDURE — 1210000000 HC MED SURG R&B

## 2017-09-25 PROCEDURE — 6360000002 HC RX W HCPCS: Performed by: FAMILY MEDICINE

## 2017-09-25 RX ORDER — METHYLPREDNISOLONE SODIUM SUCCINATE 40 MG/ML
40 INJECTION, POWDER, LYOPHILIZED, FOR SOLUTION INTRAMUSCULAR; INTRAVENOUS EVERY 6 HOURS
Status: DISCONTINUED | OUTPATIENT
Start: 2017-09-25 | End: 2017-09-26

## 2017-09-25 RX ADMIN — GUAIFENESIN 1200 MG: 600 TABLET, EXTENDED RELEASE ORAL at 08:57

## 2017-09-25 RX ADMIN — ENOXAPARIN SODIUM 40 MG: 40 INJECTION SUBCUTANEOUS at 08:57

## 2017-09-25 RX ADMIN — IPRATROPIUM BROMIDE AND ALBUTEROL SULFATE 1 AMPULE: .5; 3 SOLUTION RESPIRATORY (INHALATION) at 15:15

## 2017-09-25 RX ADMIN — METHYLPREDNISOLONE SODIUM SUCCINATE 40 MG: 40 INJECTION, POWDER, FOR SOLUTION INTRAMUSCULAR; INTRAVENOUS at 23:46

## 2017-09-25 RX ADMIN — PROPAFENONE HYDROCHLORIDE 300 MG: 150 TABLET, FILM COATED ORAL at 20:12

## 2017-09-25 RX ADMIN — CEFTRIAXONE SODIUM 1 G: 1 INJECTION, POWDER, FOR SOLUTION INTRAMUSCULAR; INTRAVENOUS at 11:22

## 2017-09-25 RX ADMIN — ASPIRIN 81 MG: 81 TABLET, COATED ORAL at 08:57

## 2017-09-25 RX ADMIN — GUAIFENESIN 1200 MG: 600 TABLET, EXTENDED RELEASE ORAL at 20:12

## 2017-09-25 RX ADMIN — Medication 10 ML: at 08:57

## 2017-09-25 RX ADMIN — IPRATROPIUM BROMIDE AND ALBUTEROL SULFATE 1 AMPULE: .5; 3 SOLUTION RESPIRATORY (INHALATION) at 07:33

## 2017-09-25 RX ADMIN — HYDROCODONE BITARTRATE AND ACETAMINOPHEN 1 TABLET: 5; 325 TABLET ORAL at 11:09

## 2017-09-25 RX ADMIN — Medication 10 ML: at 20:12

## 2017-09-25 RX ADMIN — PROPAFENONE HYDROCHLORIDE 300 MG: 150 TABLET, FILM COATED ORAL at 08:57

## 2017-09-25 RX ADMIN — ATORVASTATIN CALCIUM 40 MG: 40 TABLET, FILM COATED ORAL at 08:57

## 2017-09-25 RX ADMIN — HYDROCODONE BITARTRATE AND ACETAMINOPHEN 1 TABLET: 5; 325 TABLET ORAL at 20:12

## 2017-09-25 RX ADMIN — IPRATROPIUM BROMIDE AND ALBUTEROL SULFATE 1 AMPULE: .5; 3 SOLUTION RESPIRATORY (INHALATION) at 18:28

## 2017-09-25 RX ADMIN — LOSARTAN POTASSIUM 50 MG: 50 TABLET ORAL at 08:57

## 2017-09-25 RX ADMIN — METFORMIN HYDROCHLORIDE 1000 MG: 500 TABLET, FILM COATED ORAL at 17:25

## 2017-09-25 RX ADMIN — METFORMIN HYDROCHLORIDE 1000 MG: 500 TABLET, FILM COATED ORAL at 08:57

## 2017-09-25 ASSESSMENT — ENCOUNTER SYMPTOMS
COUGH: 1
NAUSEA: 0
VOMITING: 0
WHEEZING: 0

## 2017-09-25 ASSESSMENT — PAIN DESCRIPTION - PAIN TYPE: TYPE: ACUTE PAIN

## 2017-09-25 ASSESSMENT — PAIN DESCRIPTION - LOCATION: LOCATION: BACK

## 2017-09-25 ASSESSMENT — PAIN SCALES - GENERAL
PAINLEVEL_OUTOF10: 6
PAINLEVEL_OUTOF10: 6
PAINLEVEL_OUTOF10: 0

## 2017-09-26 LAB
ALBUMIN SERPL-MCNC: 3.2 G/DL (ref 3.5–5.2)
ALP BLD-CCNC: 69 U/L (ref 40–130)
ALT SERPL-CCNC: 32 U/L (ref 5–41)
ANION GAP SERPL CALCULATED.3IONS-SCNC: 11 MMOL/L (ref 7–19)
AST SERPL-CCNC: 16 U/L (ref 5–40)
BANDED NEUTROPHILS RELATIVE PERCENT: 2 % (ref 0–5)
BASOPHILS ABSOLUTE: 0 K/UL (ref 0–0.2)
BASOPHILS MANUAL: 0 %
BASOPHILS RELATIVE PERCENT: 0 % (ref 0–1)
BILIRUB SERPL-MCNC: 0.6 MG/DL (ref 0.2–1.2)
BUN BLDV-MCNC: 13 MG/DL (ref 8–23)
CALCIUM SERPL-MCNC: 9.2 MG/DL (ref 8.8–10.2)
CHLORIDE BLD-SCNC: 102 MMOL/L (ref 98–111)
CO2: 26 MMOL/L (ref 22–29)
CREAT SERPL-MCNC: 0.6 MG/DL (ref 0.5–1.2)
EOSINOPHILS ABSOLUTE: 0.42 K/UL (ref 0–0.6)
EOSINOPHILS RELATIVE PERCENT: 3 % (ref 0–5)
GFR NON-AFRICAN AMERICAN: >60
GLUCOSE BLD-MCNC: 187 MG/DL (ref 74–109)
GLUCOSE BLD-MCNC: 194 MG/DL (ref 70–99)
GLUCOSE BLD-MCNC: 211 MG/DL (ref 70–99)
GLUCOSE BLD-MCNC: 230 MG/DL (ref 70–99)
GLUCOSE BLD-MCNC: 273 MG/DL (ref 70–99)
HCT VFR BLD CALC: 44.4 % (ref 42–52)
HEMOGLOBIN: 13.8 G/DL (ref 14–18)
LYMPHOCYTES ABSOLUTE: 2.9 K/UL (ref 1.1–4.5)
LYMPHOCYTES RELATIVE PERCENT: 21 % (ref 20–40)
MCH RBC QN AUTO: 26.4 PG (ref 27–31)
MCHC RBC AUTO-ENTMCNC: 31.1 G/DL (ref 33–37)
MCV RBC AUTO: 85.1 FL (ref 80–94)
MONOCYTES ABSOLUTE: 0.4 K/UL (ref 0–0.9)
MONOCYTES RELATIVE PERCENT: 3 % (ref 0–10)
NEUTROPHILS ABSOLUTE: 10.1 K/UL (ref 1.5–7.5)
NEUTROPHILS MANUAL: 71 %
NEUTROPHILS RELATIVE PERCENT: 71 % (ref 50–65)
PDW BLD-RTO: 15.9 % (ref 11.5–14.5)
PERFORMED ON: ABNORMAL
PLATELET # BLD: 145 K/UL (ref 130–400)
PLATELET SLIDE REVIEW: ADEQUATE
PMV BLD AUTO: 11.7 FL (ref 9.4–12.4)
POTASSIUM SERPL-SCNC: 4.6 MMOL/L (ref 3.5–5)
RBC # BLD: 5.22 M/UL (ref 4.7–6.1)
SODIUM BLD-SCNC: 139 MMOL/L (ref 136–145)
TOTAL PROTEIN: 6.1 G/DL (ref 6.6–8.7)
WBC # BLD: 13.9 K/UL (ref 4.8–10.8)

## 2017-09-26 PROCEDURE — 6370000000 HC RX 637 (ALT 250 FOR IP): Performed by: FAMILY MEDICINE

## 2017-09-26 PROCEDURE — 36415 COLL VENOUS BLD VENIPUNCTURE: CPT

## 2017-09-26 PROCEDURE — 6360000002 HC RX W HCPCS: Performed by: FAMILY MEDICINE

## 2017-09-26 PROCEDURE — 6360000002 HC RX W HCPCS: Performed by: INTERNAL MEDICINE

## 2017-09-26 PROCEDURE — 1210000000 HC MED SURG R&B

## 2017-09-26 PROCEDURE — 80053 COMPREHEN METABOLIC PANEL: CPT

## 2017-09-26 PROCEDURE — 85007 BL SMEAR W/DIFF WBC COUNT: CPT

## 2017-09-26 PROCEDURE — 94668 MNPJ CHEST WALL SBSQ: CPT

## 2017-09-26 PROCEDURE — 2580000003 HC RX 258: Performed by: FAMILY MEDICINE

## 2017-09-26 PROCEDURE — 82948 REAGENT STRIP/BLOOD GLUCOSE: CPT

## 2017-09-26 PROCEDURE — 94640 AIRWAY INHALATION TREATMENT: CPT

## 2017-09-26 PROCEDURE — 6370000000 HC RX 637 (ALT 250 FOR IP): Performed by: NURSE PRACTITIONER

## 2017-09-26 PROCEDURE — 2580000003 HC RX 258: Performed by: INTERNAL MEDICINE

## 2017-09-26 RX ORDER — TIZANIDINE 4 MG/1
4 TABLET ORAL EVERY 6 HOURS PRN
Status: DISCONTINUED | OUTPATIENT
Start: 2017-09-26 | End: 2017-09-28 | Stop reason: HOSPADM

## 2017-09-26 RX ORDER — METHYLPREDNISOLONE SODIUM SUCCINATE 40 MG/ML
40 INJECTION, POWDER, LYOPHILIZED, FOR SOLUTION INTRAMUSCULAR; INTRAVENOUS EVERY 8 HOURS
Status: DISCONTINUED | OUTPATIENT
Start: 2017-09-26 | End: 2017-09-27

## 2017-09-26 RX ADMIN — METFORMIN HYDROCHLORIDE 1000 MG: 500 TABLET, FILM COATED ORAL at 16:46

## 2017-09-26 RX ADMIN — INSULIN LISPRO 1 UNITS: 100 INJECTION, SOLUTION INTRAVENOUS; SUBCUTANEOUS at 16:46

## 2017-09-26 RX ADMIN — TIZANIDINE 4 MG: 4 TABLET ORAL at 08:55

## 2017-09-26 RX ADMIN — IPRATROPIUM BROMIDE AND ALBUTEROL SULFATE 1 AMPULE: .5; 3 SOLUTION RESPIRATORY (INHALATION) at 15:46

## 2017-09-26 RX ADMIN — INSULIN LISPRO 2 UNITS: 100 INJECTION, SOLUTION INTRAVENOUS; SUBCUTANEOUS at 08:55

## 2017-09-26 RX ADMIN — HYDROCODONE BITARTRATE AND ACETAMINOPHEN 1 TABLET: 5; 325 TABLET ORAL at 00:13

## 2017-09-26 RX ADMIN — CEFTRIAXONE SODIUM 1 G: 1 INJECTION, POWDER, FOR SOLUTION INTRAMUSCULAR; INTRAVENOUS at 10:45

## 2017-09-26 RX ADMIN — METHYLPREDNISOLONE SODIUM SUCCINATE 40 MG: 40 INJECTION, POWDER, FOR SOLUTION INTRAMUSCULAR; INTRAVENOUS at 10:45

## 2017-09-26 RX ADMIN — PROPAFENONE HYDROCHLORIDE 300 MG: 150 TABLET, FILM COATED ORAL at 21:18

## 2017-09-26 RX ADMIN — METHYLPREDNISOLONE SODIUM SUCCINATE 40 MG: 40 INJECTION, POWDER, FOR SOLUTION INTRAMUSCULAR; INTRAVENOUS at 04:12

## 2017-09-26 RX ADMIN — ATORVASTATIN CALCIUM 40 MG: 40 TABLET, FILM COATED ORAL at 08:55

## 2017-09-26 RX ADMIN — METHYLPREDNISOLONE SODIUM SUCCINATE 40 MG: 40 INJECTION, POWDER, FOR SOLUTION INTRAMUSCULAR; INTRAVENOUS at 17:55

## 2017-09-26 RX ADMIN — IPRATROPIUM BROMIDE AND ALBUTEROL SULFATE 1 AMPULE: .5; 3 SOLUTION RESPIRATORY (INHALATION) at 11:44

## 2017-09-26 RX ADMIN — INSULIN LISPRO 2 UNITS: 100 INJECTION, SOLUTION INTRAVENOUS; SUBCUTANEOUS at 12:19

## 2017-09-26 RX ADMIN — GUAIFENESIN 1200 MG: 600 TABLET, EXTENDED RELEASE ORAL at 08:55

## 2017-09-26 RX ADMIN — IPRATROPIUM BROMIDE AND ALBUTEROL SULFATE 1 AMPULE: .5; 3 SOLUTION RESPIRATORY (INHALATION) at 20:24

## 2017-09-26 RX ADMIN — METFORMIN HYDROCHLORIDE 1000 MG: 500 TABLET, FILM COATED ORAL at 08:55

## 2017-09-26 RX ADMIN — GUAIFENESIN 1200 MG: 600 TABLET, EXTENDED RELEASE ORAL at 21:18

## 2017-09-26 RX ADMIN — INSULIN LISPRO 2 UNITS: 100 INJECTION, SOLUTION INTRAVENOUS; SUBCUTANEOUS at 21:18

## 2017-09-26 RX ADMIN — PROPAFENONE HYDROCHLORIDE 300 MG: 150 TABLET, FILM COATED ORAL at 08:55

## 2017-09-26 RX ADMIN — HYDROCODONE BITARTRATE AND ACETAMINOPHEN 1 TABLET: 5; 325 TABLET ORAL at 04:12

## 2017-09-26 RX ADMIN — IPRATROPIUM BROMIDE AND ALBUTEROL SULFATE 1 AMPULE: .5; 3 SOLUTION RESPIRATORY (INHALATION) at 07:40

## 2017-09-26 RX ADMIN — LOSARTAN POTASSIUM 50 MG: 50 TABLET ORAL at 08:59

## 2017-09-26 RX ADMIN — Medication 10 ML: at 08:59

## 2017-09-26 RX ADMIN — ASPIRIN 81 MG: 81 TABLET, COATED ORAL at 08:55

## 2017-09-26 RX ADMIN — ENOXAPARIN SODIUM 40 MG: 40 INJECTION SUBCUTANEOUS at 08:55

## 2017-09-26 ASSESSMENT — PAIN SCALES - GENERAL
PAINLEVEL_OUTOF10: 0
PAINLEVEL_OUTOF10: 6
PAINLEVEL_OUTOF10: 0
PAINLEVEL_OUTOF10: 6

## 2017-09-26 ASSESSMENT — ENCOUNTER SYMPTOMS
COUGH: 1
NAUSEA: 0
VOMITING: 0
WHEEZING: 0

## 2017-09-27 LAB
ANION GAP SERPL CALCULATED.3IONS-SCNC: 13 MMOL/L (ref 7–19)
BASOPHILS ABSOLUTE: 0 K/UL (ref 0–0.2)
BASOPHILS RELATIVE PERCENT: 0.1 % (ref 0–1)
BUN BLDV-MCNC: 20 MG/DL (ref 8–23)
CALCIUM SERPL-MCNC: 8.8 MG/DL (ref 8.8–10.2)
CHLORIDE BLD-SCNC: 103 MMOL/L (ref 98–111)
CO2: 24 MMOL/L (ref 22–29)
CREAT SERPL-MCNC: 0.7 MG/DL (ref 0.5–1.2)
EOSINOPHILS ABSOLUTE: 0 K/UL (ref 0–0.6)
EOSINOPHILS RELATIVE PERCENT: 0.1 % (ref 0–5)
GFR NON-AFRICAN AMERICAN: >60
GLUCOSE BLD-MCNC: 156 MG/DL (ref 70–99)
GLUCOSE BLD-MCNC: 165 MG/DL (ref 70–99)
GLUCOSE BLD-MCNC: 171 MG/DL (ref 70–99)
GLUCOSE BLD-MCNC: 227 MG/DL (ref 70–99)
GLUCOSE BLD-MCNC: 240 MG/DL (ref 74–109)
HCT VFR BLD CALC: 41.9 % (ref 42–52)
HEMOGLOBIN: 13 G/DL (ref 14–18)
LYMPHOCYTES ABSOLUTE: 2.4 K/UL (ref 1.1–4.5)
LYMPHOCYTES RELATIVE PERCENT: 17 % (ref 20–40)
MCH RBC QN AUTO: 26.3 PG (ref 27–31)
MCHC RBC AUTO-ENTMCNC: 31 G/DL (ref 33–37)
MCV RBC AUTO: 84.6 FL (ref 80–94)
MONOCYTES ABSOLUTE: 1 K/UL (ref 0–0.9)
MONOCYTES RELATIVE PERCENT: 7 % (ref 0–10)
NEUTROPHILS ABSOLUTE: 9.9 K/UL (ref 1.5–7.5)
NEUTROPHILS RELATIVE PERCENT: 70.8 % (ref 50–65)
PDW BLD-RTO: 15.6 % (ref 11.5–14.5)
PERFORMED ON: ABNORMAL
PLATELET # BLD: 152 K/UL (ref 130–400)
PMV BLD AUTO: 11.5 FL (ref 9.4–12.4)
POTASSIUM SERPL-SCNC: 4.4 MMOL/L (ref 3.5–5)
RBC # BLD: 4.95 M/UL (ref 4.7–6.1)
SODIUM BLD-SCNC: 140 MMOL/L (ref 136–145)
WBC # BLD: 14.1 K/UL (ref 4.8–10.8)

## 2017-09-27 PROCEDURE — 6370000000 HC RX 637 (ALT 250 FOR IP): Performed by: FAMILY MEDICINE

## 2017-09-27 PROCEDURE — 2580000003 HC RX 258: Performed by: FAMILY MEDICINE

## 2017-09-27 PROCEDURE — 80048 BASIC METABOLIC PNL TOTAL CA: CPT

## 2017-09-27 PROCEDURE — 6370000000 HC RX 637 (ALT 250 FOR IP): Performed by: NURSE PRACTITIONER

## 2017-09-27 PROCEDURE — 6360000002 HC RX W HCPCS: Performed by: INTERNAL MEDICINE

## 2017-09-27 PROCEDURE — 85025 COMPLETE CBC W/AUTO DIFF WBC: CPT

## 2017-09-27 PROCEDURE — 2580000003 HC RX 258: Performed by: INTERNAL MEDICINE

## 2017-09-27 PROCEDURE — 6360000002 HC RX W HCPCS: Performed by: FAMILY MEDICINE

## 2017-09-27 PROCEDURE — 94668 MNPJ CHEST WALL SBSQ: CPT

## 2017-09-27 PROCEDURE — 94640 AIRWAY INHALATION TREATMENT: CPT

## 2017-09-27 PROCEDURE — 36415 COLL VENOUS BLD VENIPUNCTURE: CPT

## 2017-09-27 PROCEDURE — 1210000000 HC MED SURG R&B

## 2017-09-27 PROCEDURE — 82948 REAGENT STRIP/BLOOD GLUCOSE: CPT

## 2017-09-27 PROCEDURE — 6360000002 HC RX W HCPCS: Performed by: NURSE PRACTITIONER

## 2017-09-27 RX ORDER — METHYLPREDNISOLONE SODIUM SUCCINATE 40 MG/ML
40 INJECTION, POWDER, LYOPHILIZED, FOR SOLUTION INTRAMUSCULAR; INTRAVENOUS EVERY 12 HOURS
Status: DISCONTINUED | OUTPATIENT
Start: 2017-09-27 | End: 2017-09-28

## 2017-09-27 RX ADMIN — ENOXAPARIN SODIUM 40 MG: 40 INJECTION SUBCUTANEOUS at 08:28

## 2017-09-27 RX ADMIN — IPRATROPIUM BROMIDE AND ALBUTEROL SULFATE 1 AMPULE: .5; 3 SOLUTION RESPIRATORY (INHALATION) at 21:20

## 2017-09-27 RX ADMIN — CEFTRIAXONE SODIUM 1 G: 1 INJECTION, POWDER, FOR SOLUTION INTRAMUSCULAR; INTRAVENOUS at 11:49

## 2017-09-27 RX ADMIN — INSULIN LISPRO 2 UNITS: 100 INJECTION, SOLUTION INTRAVENOUS; SUBCUTANEOUS at 08:27

## 2017-09-27 RX ADMIN — METHYLPREDNISOLONE SODIUM SUCCINATE 40 MG: 40 INJECTION, POWDER, FOR SOLUTION INTRAMUSCULAR; INTRAVENOUS at 02:34

## 2017-09-27 RX ADMIN — ATORVASTATIN CALCIUM 40 MG: 40 TABLET, FILM COATED ORAL at 08:28

## 2017-09-27 RX ADMIN — Medication 10 ML: at 08:29

## 2017-09-27 RX ADMIN — Medication 10 ML: at 21:01

## 2017-09-27 RX ADMIN — IPRATROPIUM BROMIDE AND ALBUTEROL SULFATE 1 AMPULE: .5; 3 SOLUTION RESPIRATORY (INHALATION) at 16:03

## 2017-09-27 RX ADMIN — LOSARTAN POTASSIUM 50 MG: 50 TABLET ORAL at 08:28

## 2017-09-27 RX ADMIN — GUAIFENESIN 1200 MG: 600 TABLET, EXTENDED RELEASE ORAL at 21:01

## 2017-09-27 RX ADMIN — IPRATROPIUM BROMIDE AND ALBUTEROL SULFATE 1 AMPULE: .5; 3 SOLUTION RESPIRATORY (INHALATION) at 10:42

## 2017-09-27 RX ADMIN — METFORMIN HYDROCHLORIDE 1000 MG: 500 TABLET, FILM COATED ORAL at 08:28

## 2017-09-27 RX ADMIN — METHYLPREDNISOLONE SODIUM SUCCINATE 40 MG: 40 INJECTION, POWDER, FOR SOLUTION INTRAMUSCULAR; INTRAVENOUS at 14:56

## 2017-09-27 RX ADMIN — METFORMIN HYDROCHLORIDE 1000 MG: 500 TABLET, FILM COATED ORAL at 17:22

## 2017-09-27 RX ADMIN — GUAIFENESIN 1200 MG: 600 TABLET, EXTENDED RELEASE ORAL at 08:27

## 2017-09-27 RX ADMIN — PROPAFENONE HYDROCHLORIDE 300 MG: 150 TABLET, FILM COATED ORAL at 21:01

## 2017-09-27 RX ADMIN — IPRATROPIUM BROMIDE AND ALBUTEROL SULFATE 1 AMPULE: .5; 3 SOLUTION RESPIRATORY (INHALATION) at 07:41

## 2017-09-27 RX ADMIN — PROPAFENONE HYDROCHLORIDE 300 MG: 150 TABLET, FILM COATED ORAL at 08:28

## 2017-09-27 RX ADMIN — ASPIRIN 81 MG: 81 TABLET, COATED ORAL at 08:28

## 2017-09-27 ASSESSMENT — ENCOUNTER SYMPTOMS
VOMITING: 0
NAUSEA: 0
WHEEZING: 0
COUGH: 1

## 2017-09-28 VITALS
RESPIRATION RATE: 20 BRPM | BODY MASS INDEX: 37.22 KG/M2 | DIASTOLIC BLOOD PRESSURE: 71 MMHG | OXYGEN SATURATION: 94 % | HEIGHT: 74 IN | WEIGHT: 290 LBS | TEMPERATURE: 97.4 F | SYSTOLIC BLOOD PRESSURE: 130 MMHG | HEART RATE: 65 BPM

## 2017-09-28 LAB
BLASTOMYCES ANTIBODY CF: NORMAL
GLUCOSE BLD-MCNC: 206 MG/DL (ref 70–99)
PERFORMED ON: ABNORMAL

## 2017-09-28 PROCEDURE — 6370000000 HC RX 637 (ALT 250 FOR IP): Performed by: NURSE PRACTITIONER

## 2017-09-28 PROCEDURE — 82948 REAGENT STRIP/BLOOD GLUCOSE: CPT

## 2017-09-28 PROCEDURE — 6360000002 HC RX W HCPCS: Performed by: NURSE PRACTITIONER

## 2017-09-28 PROCEDURE — 6370000000 HC RX 637 (ALT 250 FOR IP): Performed by: FAMILY MEDICINE

## 2017-09-28 PROCEDURE — 94640 AIRWAY INHALATION TREATMENT: CPT

## 2017-09-28 RX ORDER — PREDNISONE 20 MG/1
20 TABLET ORAL DAILY
Status: DISCONTINUED | OUTPATIENT
Start: 2017-10-04 | End: 2017-09-28 | Stop reason: HOSPADM

## 2017-09-28 RX ORDER — CEFDINIR 300 MG/1
300 CAPSULE ORAL EVERY 12 HOURS SCHEDULED
Status: DISCONTINUED | OUTPATIENT
Start: 2017-09-28 | End: 2017-09-28 | Stop reason: HOSPADM

## 2017-09-28 RX ORDER — HYDROCODONE POLISTIREX AND CHLORPHENIRAMINE POLISTIREX 10; 8 MG/5ML; MG/5ML
5 SUSPENSION, EXTENDED RELEASE ORAL EVERY 12 HOURS PRN
Qty: 100 ML | Refills: 0 | Status: SHIPPED | OUTPATIENT
Start: 2017-09-28 | End: 2019-08-06 | Stop reason: ALTCHOICE

## 2017-09-28 RX ORDER — PREDNISONE 20 MG/1
20 TABLET ORAL 2 TIMES DAILY WITH MEALS
Status: DISCONTINUED | OUTPATIENT
Start: 2017-09-28 | End: 2017-09-28 | Stop reason: SDUPTHER

## 2017-09-28 RX ORDER — CEFDINIR 300 MG/1
300 CAPSULE ORAL EVERY 12 HOURS SCHEDULED
Qty: 18 CAPSULE | Refills: 0 | Status: SHIPPED | OUTPATIENT
Start: 2017-09-28 | End: 2017-10-07

## 2017-09-28 RX ORDER — PREDNISONE 20 MG/1
20 TABLET ORAL 2 TIMES DAILY
Status: DISCONTINUED | OUTPATIENT
Start: 2017-09-28 | End: 2017-09-28 | Stop reason: HOSPADM

## 2017-09-28 RX ORDER — PREDNISONE 20 MG/1
TABLET ORAL
Qty: 14 TABLET | Refills: 0 | Status: SHIPPED | OUTPATIENT
Start: 2017-09-28 | End: 2017-12-18 | Stop reason: ALTCHOICE

## 2017-09-28 RX ORDER — PREDNISONE 20 MG/1
10 TABLET ORAL DAILY
Status: DISCONTINUED | OUTPATIENT
Start: 2017-10-10 | End: 2017-09-28 | Stop reason: HOSPADM

## 2017-09-28 RX ADMIN — PREDNISONE 20 MG: 20 TABLET ORAL at 09:29

## 2017-09-28 RX ADMIN — CEFDINIR 300 MG: 300 CAPSULE ORAL at 09:27

## 2017-09-28 RX ADMIN — IPRATROPIUM BROMIDE AND ALBUTEROL SULFATE 1 AMPULE: .5; 3 SOLUTION RESPIRATORY (INHALATION) at 07:24

## 2017-09-28 RX ADMIN — GUAIFENESIN 1200 MG: 600 TABLET, EXTENDED RELEASE ORAL at 09:27

## 2017-09-28 RX ADMIN — ASPIRIN 81 MG: 81 TABLET, COATED ORAL at 09:28

## 2017-09-28 RX ADMIN — METFORMIN HYDROCHLORIDE 1000 MG: 500 TABLET, FILM COATED ORAL at 09:27

## 2017-09-28 RX ADMIN — METHYLPREDNISOLONE SODIUM SUCCINATE 40 MG: 40 INJECTION, POWDER, FOR SOLUTION INTRAMUSCULAR; INTRAVENOUS at 02:16

## 2017-09-28 RX ADMIN — LOSARTAN POTASSIUM 50 MG: 50 TABLET ORAL at 09:28

## 2017-09-28 RX ADMIN — ATORVASTATIN CALCIUM 40 MG: 40 TABLET, FILM COATED ORAL at 09:27

## 2017-09-28 RX ADMIN — PREDNISONE 20 MG: 20 TABLET ORAL at 09:28

## 2017-09-28 ASSESSMENT — ENCOUNTER SYMPTOMS
VOMITING: 0
NAUSEA: 0
COUGH: 1
WHEEZING: 0

## 2017-10-04 LAB
EKG P AXIS: 38 DEGREES
EKG P-R INTERVAL: 128 MS
EKG Q-T INTERVAL: 348 MS
EKG QRS DURATION: 132 MS
EKG QTC CALCULATION (BAZETT): 421 MS
EKG T AXIS: 15 DEGREES

## 2017-10-24 LAB
FUNGUS (MYCOLOGY) CULTURE: NORMAL
KOH PREP: NORMAL

## 2017-10-25 ENCOUNTER — HOSPITAL ENCOUNTER (OUTPATIENT)
Dept: GENERAL RADIOLOGY | Age: 62
Discharge: HOME | End: 2017-10-25
Payer: COMMERCIAL

## 2017-10-25 DIAGNOSIS — J18.9 PNEUMONIA OF LEFT LOWER LOBE DUE TO INFECTIOUS ORGANISM: ICD-10-CM

## 2017-10-25 PROCEDURE — 71020 XR CHEST STANDARD TWO VW: CPT

## 2017-11-08 LAB
AFB CULTURE (MYCOBACTERIA): NORMAL
AFB SMEAR: NORMAL

## 2017-12-18 ENCOUNTER — OFFICE VISIT (OUTPATIENT)
Dept: CARDIOLOGY | Age: 62
End: 2017-12-18
Payer: COMMERCIAL

## 2017-12-18 VITALS
DIASTOLIC BLOOD PRESSURE: 64 MMHG | WEIGHT: 306 LBS | HEART RATE: 82 BPM | HEIGHT: 74 IN | BODY MASS INDEX: 39.27 KG/M2 | SYSTOLIC BLOOD PRESSURE: 138 MMHG

## 2017-12-18 DIAGNOSIS — I48.0 PAROXYSMAL ATRIAL FIBRILLATION (HCC): Primary | ICD-10-CM

## 2017-12-18 DIAGNOSIS — I10 ESSENTIAL HYPERTENSION: ICD-10-CM

## 2017-12-18 PROCEDURE — 3017F COLORECTAL CA SCREEN DOC REV: CPT | Performed by: INTERNAL MEDICINE

## 2017-12-18 PROCEDURE — 99213 OFFICE O/P EST LOW 20 MIN: CPT | Performed by: INTERNAL MEDICINE

## 2017-12-18 PROCEDURE — G8484 FLU IMMUNIZE NO ADMIN: HCPCS | Performed by: INTERNAL MEDICINE

## 2017-12-18 PROCEDURE — G8417 CALC BMI ABV UP PARAM F/U: HCPCS | Performed by: INTERNAL MEDICINE

## 2017-12-18 PROCEDURE — 1036F TOBACCO NON-USER: CPT | Performed by: INTERNAL MEDICINE

## 2017-12-18 PROCEDURE — G8427 DOCREV CUR MEDS BY ELIG CLIN: HCPCS | Performed by: INTERNAL MEDICINE

## 2017-12-18 RX ORDER — LOSARTAN POTASSIUM AND HYDROCHLOROTHIAZIDE 12.5; 5 MG/1; MG/1
1 TABLET ORAL DAILY
COMMUNITY

## 2017-12-18 RX ORDER — PROPAFENONE HYDROCHLORIDE 300 MG/1
300 TABLET, COATED ORAL EVERY 8 HOURS
COMMUNITY

## 2017-12-27 ENCOUNTER — HOSPITAL ENCOUNTER (OUTPATIENT)
Dept: GENERAL RADIOLOGY | Age: 62
Discharge: HOME | End: 2017-12-27
Payer: COMMERCIAL

## 2017-12-27 DIAGNOSIS — Z00.01 ENCOUNTER FOR GENERAL ADULT MEDICAL EXAMINATION WITH ABNORMAL FINDINGS: ICD-10-CM

## 2017-12-27 DIAGNOSIS — M51.36 OTHER INTERVERTEBRAL DISC DEGENERATION, LUMBAR REGION: ICD-10-CM

## 2017-12-27 PROCEDURE — 72110 X-RAY EXAM L-2 SPINE 4/>VWS: CPT

## 2017-12-29 NOTE — PROGRESS NOTES
Review of Systems  Constitutional:  Negative for significant fatigue, activity change, appetite change, or unexpected weight change. Negative for fever, chills or diaphoresis. HENT:  Negative for nosebleeds, facial swelling, rhinorrhea or neck stiffness. RESPIRATORY:  Negative for shortness of breath. No cough, wheezing or stridor. CARDIOVASCULAR:  Negative for chest pain, palpitations or leg swelling. GASTROINTESTINAL:   Negative for abdominal distention or pain. Negative for constipation, diarrhea, nausea or vomiting. GENITOURINARY:  Negative for dysuria, frequency or urgency. MUSCULOSKELETAL:   Negative for myalgia or arthralgia. EXTREMITIES:  Negative for clubbing, cyanosis or edema. SKIN:  Negative for color change or rash. NEUROLOGICAL:   Negative for dizziness, light-headedness, numbness or headaches. Negative for speech difficulty. HEMATOLOGICAL:   No excessive bruising or bleeding. PSYCHIATRIC/BEHAVIORAL:   No severe confusion, anxiety, or insomnia. Except as noted in the HPI, all other systems are negative. Physical Exam:  Vital Signs:  /64   Pulse 82   Ht 6' 2\" (1.88 m)   Wt (!) 306 lb (138.8 kg)   BMI 39.29 kg/m²   Constitutional:  The patient is a pleasant 58 y.o. male in no acute distress. He appears well-developed and well-nourished. HEAD:  Normocephalic without evidence of old or recent trauma. EYES:  Sclerae clear. Conjunctivae pink. EOMs intact. Pupils equal and round. NOSE:  No nasal discharge or epistaxis. MOUTH:  Teeth, gums and palate normal.   THROAT:  No lesions on lips or buccal mucosa. Tongue protrudes in midline and is well papillated. NECK:  There are no carotid bruits. No noted jugular venous distention. No thyromegaly. CHEST:  Clear bilateral breath sounds without wheezes or rhonchi. RESPIRATORY:  The lungs clear to auscultation bilaterally with normal respiratory effort.   CARDIOVASCULAR:   The heart's rhythm is regular without audible murmur or gallop. ABDOMEN:  Soft, no tenderness or mass. UPPER EXTREMITY EVALUATION:  Radial pulses palpable bilaterally. LOWER EXTREMITY EVALUATION:  Negative for peripheral edema. Femoral, popliteal, dorsalis pedis, and posterior tibialis pulses 2+ to palpation bilaterally. No cyanosis or clubbing. MUSCULOSKELETAL:  Normal muscle strength and tone. No atrophy or abnormalities. SKIN:  Warm, dry, intact. No dermatitis or ulcers. NEUROLOGIC:  Intact cranial nerves II through XII and no focal weakness. Assessment / Plan:   1. Paroxysmal atrial fibrillation - he has had no reoccurrance. He is on Rythmol. He is not anticoagulated however. Historically, he has always been able to tell when he is out of rhythm. 2.  He will continue his current therapy and return in six months.        _____________________________________________________  Electronically Signed by:   C. Verdia Bence, M.D., F.A.C.C.    [id number] [place] [address] [facility]  _____________________________________________________  Copy:  Bruno Padilla MD

## 2018-02-23 ENCOUNTER — TRANSCRIBE ORDERS (OUTPATIENT)
Dept: ADMINISTRATIVE | Facility: HOSPITAL | Age: 63
End: 2018-02-23

## 2018-02-23 DIAGNOSIS — M54.16 LUMBAR RADICULOPATHY: ICD-10-CM

## 2018-02-23 DIAGNOSIS — M54.5 LOW BACK PAIN, UNSPECIFIED BACK PAIN LATERALITY, UNSPECIFIED CHRONICITY, WITH SCIATICA PRESENCE UNSPECIFIED: Primary | ICD-10-CM

## 2018-02-28 ENCOUNTER — HOSPITAL ENCOUNTER (OUTPATIENT)
Dept: MRI IMAGING | Facility: HOSPITAL | Age: 63
Discharge: HOME | End: 2018-02-28
Admitting: PHYSICIAN ASSISTANT

## 2018-02-28 DIAGNOSIS — M54.16 LUMBAR RADICULOPATHY: ICD-10-CM

## 2018-02-28 DIAGNOSIS — M54.5 LOW BACK PAIN, UNSPECIFIED BACK PAIN LATERALITY, UNSPECIFIED CHRONICITY, WITH SCIATICA PRESENCE UNSPECIFIED: ICD-10-CM

## 2018-02-28 PROCEDURE — 72148 MRI LUMBAR SPINE W/O DYE: CPT

## 2018-08-29 ENCOUNTER — HOSPITAL ENCOUNTER (OUTPATIENT)
Dept: NEUROLOGY | Age: 63
Discharge: HOME | End: 2018-08-29
Payer: COMMERCIAL

## 2018-08-29 PROCEDURE — 95910 NRV CNDJ TEST 7-8 STUDIES: CPT | Performed by: PSYCHIATRY & NEUROLOGY

## 2018-08-29 PROCEDURE — 95886 MUSC TEST DONE W/N TEST COMP: CPT | Performed by: PSYCHIATRY & NEUROLOGY

## 2018-08-29 PROCEDURE — 95886 MUSC TEST DONE W/N TEST COMP: CPT

## 2018-08-29 PROCEDURE — 95910 NRV CNDJ TEST 7-8 STUDIES: CPT

## 2018-09-14 ENCOUNTER — TELEPHONE (OUTPATIENT)
Dept: CARDIOLOGY | Age: 63
End: 2018-09-14

## 2018-09-14 NOTE — TELEPHONE ENCOUNTER
Date: 10/3/18    Cardiologist: Shanice    Procedure: Carpal tunnel release    Surgeon: Anival    Last Office Visit: 12/18/17  Reason for office visit and medical concerns addressed at this office visit: HTN, PAF, Hyperlipidemia    Testing Performed and Date of Service: 2D Echo 9/20/17      RCRI = (0pt) Low risk at 0.4%  METs msg left for pt to call back to review. As per visit note pt has always known in the past when he goes out of rhythm. Current Medications: ASA, Rythmol, Hyzaar, Tussionex, Albuterol, Lipitor, Glucophage    Is the patient currently taking an anticoagulant? If so, what is the diagnosis the patient has been given to warrant the need for the anticoagulant? ASA 81mg    Additional Notes: Cardiac risk request, can pt hold ASA?

## 2019-05-31 ENCOUNTER — TRANSCRIBE ORDERS (OUTPATIENT)
Dept: ADMINISTRATIVE | Facility: HOSPITAL | Age: 64
End: 2019-05-31

## 2019-05-31 ENCOUNTER — HOSPITAL ENCOUNTER (OUTPATIENT)
Dept: GENERAL RADIOLOGY | Age: 64
Discharge: HOME | End: 2019-05-31
Payer: COMMERCIAL

## 2019-05-31 DIAGNOSIS — M54.5 LOW BACK PAIN, UNSPECIFIED BACK PAIN LATERALITY, UNSPECIFIED CHRONICITY, WITH SCIATICA PRESENCE UNSPECIFIED: Primary | ICD-10-CM

## 2019-05-31 DIAGNOSIS — M54.5 LOW BACK PAIN, UNSPECIFIED BACK PAIN LATERALITY, UNSPECIFIED CHRONICITY, WITH SCIATICA PRESENCE UNSPECIFIED: ICD-10-CM

## 2019-05-31 PROCEDURE — 72100 X-RAY EXAM L-S SPINE 2/3 VWS: CPT

## 2019-08-06 ENCOUNTER — HOSPITAL ENCOUNTER (EMERGENCY)
Age: 64
Discharge: HOME | End: 2019-08-06
Attending: EMERGENCY MEDICINE
Payer: COMMERCIAL

## 2019-08-06 ENCOUNTER — APPOINTMENT (OUTPATIENT)
Dept: GENERAL RADIOLOGY | Age: 64
End: 2019-08-06
Payer: COMMERCIAL

## 2019-08-06 VITALS
OXYGEN SATURATION: 92 % | TEMPERATURE: 99 F | WEIGHT: 300 LBS | DIASTOLIC BLOOD PRESSURE: 58 MMHG | RESPIRATION RATE: 20 BRPM | HEIGHT: 74 IN | HEART RATE: 80 BPM | BODY MASS INDEX: 38.5 KG/M2 | SYSTOLIC BLOOD PRESSURE: 105 MMHG

## 2019-08-06 DIAGNOSIS — R11.2 NON-INTRACTABLE VOMITING WITH NAUSEA, UNSPECIFIED VOMITING TYPE: ICD-10-CM

## 2019-08-06 DIAGNOSIS — R51.9 ACUTE NONINTRACTABLE HEADACHE, UNSPECIFIED HEADACHE TYPE: Primary | ICD-10-CM

## 2019-08-06 LAB
ALBUMIN SERPL-MCNC: 4.3 G/DL (ref 3.5–5.2)
ALP BLD-CCNC: 101 U/L (ref 40–130)
ALT SERPL-CCNC: 40 U/L (ref 5–41)
ANION GAP SERPL CALCULATED.3IONS-SCNC: 18 MMOL/L (ref 7–19)
AST SERPL-CCNC: 24 U/L (ref 5–40)
BASOPHILS ABSOLUTE: 0.1 K/UL (ref 0–0.2)
BASOPHILS RELATIVE PERCENT: 0.4 % (ref 0–1)
BILIRUB SERPL-MCNC: 1 MG/DL (ref 0.2–1.2)
BILIRUBIN URINE: NEGATIVE
BLOOD, URINE: NEGATIVE
BUN BLDV-MCNC: 22 MG/DL (ref 8–23)
CALCIUM SERPL-MCNC: 9.4 MG/DL (ref 8.8–10.2)
CHLORIDE BLD-SCNC: 99 MMOL/L (ref 98–111)
CLARITY: ABNORMAL
CO2: 22 MMOL/L (ref 22–29)
COLOR: YELLOW
CREAT SERPL-MCNC: 1 MG/DL (ref 0.5–1.2)
EKG P AXIS: 41 DEGREES
EKG P-R INTERVAL: 140 MS
EKG Q-T INTERVAL: 402 MS
EKG QRS DURATION: 152 MS
EKG QTC CALCULATION (BAZETT): 461 MS
EKG T AXIS: 13 DEGREES
EOSINOPHILS ABSOLUTE: 0.1 K/UL (ref 0–0.6)
EOSINOPHILS RELATIVE PERCENT: 0.8 % (ref 0–5)
GFR NON-AFRICAN AMERICAN: >60
GLUCOSE BLD-MCNC: 258 MG/DL (ref 74–109)
GLUCOSE URINE: >=1000 MG/DL
HCT VFR BLD CALC: 50.7 % (ref 42–52)
HEMOGLOBIN: 15.7 G/DL (ref 14–18)
KETONES, URINE: 15 MG/DL
LEUKOCYTE ESTERASE, URINE: NEGATIVE
LIPASE: 29 U/L (ref 13–60)
LYMPHOCYTES ABSOLUTE: 1.4 K/UL (ref 1.1–4.5)
LYMPHOCYTES RELATIVE PERCENT: 9.3 % (ref 20–40)
MCH RBC QN AUTO: 25.9 PG (ref 27–31)
MCHC RBC AUTO-ENTMCNC: 31 G/DL (ref 33–37)
MCV RBC AUTO: 83.5 FL (ref 80–94)
MONOCYTES ABSOLUTE: 1.2 K/UL (ref 0–0.9)
MONOCYTES RELATIVE PERCENT: 8.2 % (ref 0–10)
NEUTROPHILS ABSOLUTE: 12.2 K/UL (ref 1.5–7.5)
NEUTROPHILS RELATIVE PERCENT: 81 % (ref 50–65)
NITRITE, URINE: NEGATIVE
PDW BLD-RTO: 16.4 % (ref 11.5–14.5)
PH UA: 5.5 (ref 5–8)
PLATELET # BLD: 127 K/UL (ref 130–400)
PMV BLD AUTO: 11.9 FL (ref 9.4–12.4)
POTASSIUM SERPL-SCNC: 4 MMOL/L (ref 3.5–5)
PROTEIN UA: NEGATIVE MG/DL
RBC # BLD: 6.07 M/UL (ref 4.7–6.1)
SODIUM BLD-SCNC: 139 MMOL/L (ref 136–145)
SPECIFIC GRAVITY UA: 1.04 (ref 1–1.03)
TOTAL PROTEIN: 7 G/DL (ref 6.6–8.7)
TROPONIN: <0.01 NG/ML (ref 0–0.03)
URINE REFLEX TO CULTURE: ABNORMAL
UROBILINOGEN, URINE: 0.2 E.U./DL
WBC # BLD: 15.1 K/UL (ref 4.8–10.8)

## 2019-08-06 PROCEDURE — 6360000002 HC RX W HCPCS: Performed by: EMERGENCY MEDICINE

## 2019-08-06 PROCEDURE — 99284 EMERGENCY DEPT VISIT MOD MDM: CPT

## 2019-08-06 PROCEDURE — 2580000003 HC RX 258: Performed by: EMERGENCY MEDICINE

## 2019-08-06 PROCEDURE — 93005 ELECTROCARDIOGRAM TRACING: CPT

## 2019-08-06 PROCEDURE — 80053 COMPREHEN METABOLIC PANEL: CPT

## 2019-08-06 PROCEDURE — 99284 EMERGENCY DEPT VISIT MOD MDM: CPT | Performed by: EMERGENCY MEDICINE

## 2019-08-06 PROCEDURE — 83690 ASSAY OF LIPASE: CPT

## 2019-08-06 PROCEDURE — 96375 TX/PRO/DX INJ NEW DRUG ADDON: CPT

## 2019-08-06 PROCEDURE — 96374 THER/PROPH/DIAG INJ IV PUSH: CPT

## 2019-08-06 PROCEDURE — 85025 COMPLETE CBC W/AUTO DIFF WBC: CPT

## 2019-08-06 PROCEDURE — 71046 X-RAY EXAM CHEST 2 VIEWS: CPT

## 2019-08-06 PROCEDURE — 36415 COLL VENOUS BLD VENIPUNCTURE: CPT

## 2019-08-06 PROCEDURE — 81003 URINALYSIS AUTO W/O SCOPE: CPT

## 2019-08-06 PROCEDURE — 84484 ASSAY OF TROPONIN QUANT: CPT

## 2019-08-06 RX ORDER — METOCLOPRAMIDE HYDROCHLORIDE 5 MG/ML
10 INJECTION INTRAMUSCULAR; INTRAVENOUS ONCE
Status: COMPLETED | OUTPATIENT
Start: 2019-08-06 | End: 2019-08-06

## 2019-08-06 RX ORDER — SODIUM CHLORIDE, SODIUM LACTATE, POTASSIUM CHLORIDE, AND CALCIUM CHLORIDE .6; .31; .03; .02 G/100ML; G/100ML; G/100ML; G/100ML
1000 INJECTION, SOLUTION INTRAVENOUS ONCE
Status: COMPLETED | OUTPATIENT
Start: 2019-08-06 | End: 2019-08-06

## 2019-08-06 RX ORDER — DEXAMETHASONE SODIUM PHOSPHATE 10 MG/ML
10 INJECTION, SOLUTION INTRAMUSCULAR; INTRAVENOUS ONCE
Status: COMPLETED | OUTPATIENT
Start: 2019-08-06 | End: 2019-08-06

## 2019-08-06 RX ORDER — ONDANSETRON 4 MG/1
4 TABLET, ORALLY DISINTEGRATING ORAL EVERY 8 HOURS PRN
Qty: 12 TABLET | Refills: 0 | Status: SHIPPED | OUTPATIENT
Start: 2019-08-06

## 2019-08-06 RX ORDER — KETOROLAC TROMETHAMINE 30 MG/ML
30 INJECTION, SOLUTION INTRAMUSCULAR; INTRAVENOUS ONCE
Status: COMPLETED | OUTPATIENT
Start: 2019-08-06 | End: 2019-08-06

## 2019-08-06 RX ADMIN — DEXAMETHASONE SODIUM PHOSPHATE 10 MG: 10 INJECTION, SOLUTION INTRAMUSCULAR; INTRAVENOUS at 04:37

## 2019-08-06 RX ADMIN — SODIUM CHLORIDE, POTASSIUM CHLORIDE, SODIUM LACTATE AND CALCIUM CHLORIDE 1000 ML: 600; 310; 30; 20 INJECTION, SOLUTION INTRAVENOUS at 04:37

## 2019-08-06 RX ADMIN — KETOROLAC TROMETHAMINE 30 MG: 30 INJECTION, SOLUTION INTRAMUSCULAR; INTRAVENOUS at 04:37

## 2019-08-06 RX ADMIN — METOCLOPRAMIDE 10 MG: 5 INJECTION, SOLUTION INTRAMUSCULAR; INTRAVENOUS at 04:37

## 2019-08-06 ASSESSMENT — ENCOUNTER SYMPTOMS
EYE PAIN: 0
COLOR CHANGE: 0
VOMITING: 1
DIARRHEA: 0
ABDOMINAL PAIN: 0
CONSTIPATION: 0
NAUSEA: 0
SHORTNESS OF BREATH: 0
WHEEZING: 0
TROUBLE SWALLOWING: 0
BACK PAIN: 0
SINUS PRESSURE: 0
CHEST TIGHTNESS: 0
PHOTOPHOBIA: 0

## 2019-08-06 ASSESSMENT — PAIN SCALES - GENERAL
PAINLEVEL_OUTOF10: 5
PAINLEVEL_OUTOF10: 6

## 2019-08-06 ASSESSMENT — PAIN DESCRIPTION - LOCATION: LOCATION: ABDOMEN;HEAD

## 2019-08-06 NOTE — ED PROVIDER NOTES
for dizziness, facial asymmetry, speech difficulty, weakness, light-headedness and numbness. Psychiatric/Behavioral: Negative for agitation, confusion, hallucinations and suicidal ideas. The patient is not nervous/anxious. PAST MEDICALHISTORY     Past Medical History:   Diagnosis Date    Diabetes mellitus ([address]     Hyperlipidemia     Cholesterol management per his pcp.  Hypertension     Irregular heart beat     Paroxysmal atrial fibrillation (HCC)     Pilonidal cyst     Sleep apnea     does not use a machine         SURGICAL HISTORY       Past Surgical History:   Procedure Laterality Date    APPENDECTOMY      BACK SURGERY      CHOLECYSTECTOMY, LAPAROSCOPIC  10/16/03    w/lysis of adhesions and hernia repair-Dr Jade Barkley    COLONOSCOPY  10/5/10    Dr Vickie Blas-internal hemorrhoids, bx not available    COLONOSCOPY N/A 7/15/2016    Dr Selma Brock, 10 yr recall    KNEE SURGERY Bilateral     PILONIDAL CYST EXCISION      UMBILICAL HERNIA REPAIR  10/16/03    w/qamar-Dr Jade Barkley    UPPER GASTROINTESTINAL ENDOSCOPY N/A 7/15/2016    Dr JR Ley-(+) H Pylori, moderate chronic gastritis         CURRENT MEDICATIONS     Previous Medications    ALBUTEROL SULFATE  (90 BASE) MCG/ACT INHALER    Inhale 2 puffs into the lungs every 6 hours as needed for Shortness of Breath    ASPIRIN 81 MG EC TABLET    Take 81 mg by mouth daily. ATORVASTATIN (LIPITOR) 40 MG TABLET    Take 40 mg by mouth daily     LOSARTAN-HYDROCHLOROTHIAZIDE (HYZAAR) 50-12.5 MG PER TABLET    Take 1 tablet by mouth daily    LYSINE PO    Take 1 tablet by mouth daily    METFORMIN (GLUCOPHAGE) 500 MG TABLET    Take 1,000 mg by mouth 2 times daily (with meals)     PROPAFENONE (RYTHMOL) 300 MG TABLET    Take 300 mg by mouth every 8 hours       ALLERGIES     Patient has no known allergies.     FAMILY HISTORY       Family History   Problem Relation Age of Onset    Heart Failure Father     Heart Failure Mother     Stroke Mother life or limb threatening cause of the patients sxs. The patient's symptoms have improved from presentation and appears clinically well. Patient reexamined prior to discharge and appears improved. Patient has been encouraged to follow up with his/her PCP and to return to the ED with any lack of improvement or worsening symptoms. The patient';s questions regarding the work up and plan were invited and answered. The patient/guardian states understanding and agreement with the plan. Patient Progress  Patient progress: stable      Reassessment      CONSULTS:  None    PROCEDURES:  Unless otherwise noted below, none     Procedures    FINAL IMPRESSION      1. Acute nonintractable headache, unspecified headache type    2.  Non-intractable vomiting with nausea, unspecified vomiting type          DISPOSITION/PLAN   DISPOSITION Decision To Discharge 08/06/2019 08:02:37 AM      PATIENT REFERRED TO:  Suzanne Orellana, [address] [place] [address] [phone]    Call in 3 days  For follow up, As needed      DISCHARGE MEDICATIONS:  New Prescriptions    ONDANSETRON (ZOFRAN ODT) 4 MG DISINTEGRATING TABLET    Take 1 tablet by mouth every 8 hours as needed for Nausea or Vomiting          (Please note that portions of this note were completed with a voice recognition program.  Efforts were made to edit thedictations but occasionally words are mis-transcribed.)    Du Gonzalez MD (electronically signed)  Attending Emergency Physician          Segundo Kirby MD  08/06/19 [id number]

## 2019-08-06 NOTE — ED NOTES
ASSESSMENT: c/o h/a and ab pain chills started 0000    PT ALERT/ORIENTED X4. PUPILS EQUAL/REACTIVE    SKIN:  WARM/DRY PINK CAPILLARY REFILL < 2SECS    CARDIAC:  S1 S2 NOTED     LUNGS: CLEAR UPPER AND LOWER LOBES, RESPIRATIONS EVEN/UNLABORED     ABDOMEN: BOWEL SOUNDS NOTED UPPER AND LOWER QUADRANTS                     SOFT AND NONTENDER. EXTREMITIES:  BILATERAL DP AND PT AND NO EDEMA NOTED. NO DISTRESS NOTED. SIDE RAILS UP AND CALL LIGHT IN REACH.      Silvestre Bañuelos RN  08/06/19 [id number]

## 2020-06-03 ENCOUNTER — HOSPITAL ENCOUNTER (OUTPATIENT)
Dept: GENERAL RADIOLOGY | Facility: HOSPITAL | Age: 65
Discharge: HOME | End: 2020-06-03
Admitting: FAMILY MEDICINE

## 2020-06-03 ENCOUNTER — TRANSCRIBE ORDERS (OUTPATIENT)
Dept: ADMINISTRATIVE | Facility: HOSPITAL | Age: 65
End: 2020-06-03

## 2020-06-03 DIAGNOSIS — M54.2 CERVICALGIA: ICD-10-CM

## 2020-06-03 DIAGNOSIS — M54.2 CERVICALGIA: Primary | ICD-10-CM

## 2020-06-03 PROCEDURE — 72040 X-RAY EXAM NECK SPINE 2-3 VW: CPT

## 2022-11-10 ENCOUNTER — APPOINTMENT (OUTPATIENT)
Dept: CT IMAGING | Age: 67
DRG: 176 | End: 2022-11-10
Payer: MEDICARE

## 2022-11-10 ENCOUNTER — HOSPITAL ENCOUNTER (INPATIENT)
Age: 67
LOS: 2 days | Discharge: HOME | DRG: 176 | End: 2022-11-12
Attending: FAMILY MEDICINE | Admitting: FAMILY MEDICINE
Payer: MEDICARE

## 2022-11-10 ENCOUNTER — APPOINTMENT (OUTPATIENT)
Dept: GENERAL RADIOLOGY | Age: 67
DRG: 176 | End: 2022-11-10
Payer: MEDICARE

## 2022-11-10 DIAGNOSIS — M50.30 BULGING OF CERVICAL INTERVERTEBRAL DISC: ICD-10-CM

## 2022-11-10 DIAGNOSIS — I26.94 MULTIPLE SUBSEGMENTAL PULMONARY EMBOLI WITHOUT ACUTE COR PULMONALE (HCC): Primary | ICD-10-CM

## 2022-11-10 PROBLEM — I26.99 PULMONARY EMBOLISM WITHOUT ACUTE COR PULMONALE, UNSPECIFIED CHRONICITY, UNSPECIFIED PULMONARY EMBOLISM TYPE (HCC): Status: ACTIVE | Noted: 2022-11-10

## 2022-11-10 LAB
ALBUMIN SERPL-MCNC: 4.5 G/DL (ref 3.5–5.2)
ALP BLD-CCNC: 103 U/L (ref 40–130)
ALT SERPL-CCNC: 27 U/L (ref 5–41)
ANION GAP SERPL CALCULATED.3IONS-SCNC: 13 MMOL/L (ref 7–19)
AST SERPL-CCNC: 19 U/L (ref 5–40)
BASOPHILS ABSOLUTE: 0 K/UL (ref 0–0.2)
BASOPHILS RELATIVE PERCENT: 0.2 % (ref 0–1)
BILIRUB SERPL-MCNC: 0.9 MG/DL (ref 0.2–1.2)
BUN BLDV-MCNC: 25 MG/DL (ref 8–23)
CALCIUM SERPL-MCNC: 9.6 MG/DL (ref 8.8–10.2)
CHLORIDE BLD-SCNC: 105 MMOL/L (ref 98–111)
CO2: 21 MMOL/L (ref 22–29)
CREAT SERPL-MCNC: 1 MG/DL (ref 0.5–1.2)
D DIMER: 2.56 UG/ML FEU (ref 0–0.48)
EOSINOPHILS ABSOLUTE: 0.1 K/UL (ref 0–0.6)
EOSINOPHILS RELATIVE PERCENT: 0.4 % (ref 0–5)
GFR SERPL CREATININE-BSD FRML MDRD: >60 ML/MIN/{1.73_M2}
GLUCOSE BLD-MCNC: 235 MG/DL (ref 74–109)
HBA1C MFR BLD: 8.2 % (ref 4–6)
HCT VFR BLD CALC: 54.2 % (ref 42–52)
HEMOGLOBIN: 16.9 G/DL (ref 14–18)
IMMATURE GRANULOCYTES #: 0.1 K/UL
LYMPHOCYTES ABSOLUTE: 1.5 K/UL (ref 1.1–4.5)
LYMPHOCYTES RELATIVE PERCENT: 9.5 % (ref 20–40)
MAGNESIUM: 2 MG/DL (ref 1.6–2.4)
MCH RBC QN AUTO: 26.9 PG (ref 27–31)
MCHC RBC AUTO-ENTMCNC: 31.2 G/DL (ref 33–37)
MCV RBC AUTO: 86.2 FL (ref 80–94)
MONOCYTES ABSOLUTE: 1.3 K/UL (ref 0–0.9)
MONOCYTES RELATIVE PERCENT: 8.1 % (ref 0–10)
NEUTROPHILS ABSOLUTE: 13.3 K/UL (ref 1.5–7.5)
NEUTROPHILS RELATIVE PERCENT: 81.5 % (ref 50–65)
PDW BLD-RTO: 17.1 % (ref 11.5–14.5)
PLATELET # BLD: 125 K/UL (ref 130–400)
PMV BLD AUTO: 12.1 FL (ref 9.4–12.4)
POTASSIUM REFLEX MAGNESIUM: 4.4 MMOL/L (ref 3.5–5)
RBC # BLD: 6.29 M/UL (ref 4.7–6.1)
SARS-COV-2, NAAT: NOT DETECTED
SODIUM BLD-SCNC: 139 MMOL/L (ref 136–145)
TOTAL PROTEIN: 7.1 G/DL (ref 6.6–8.7)
TROPONIN: <0.01 NG/ML (ref 0–0.03)
WBC # BLD: 16.3 K/UL (ref 4.8–10.8)

## 2022-11-10 PROCEDURE — 99285 EMERGENCY DEPT VISIT HI MDM: CPT

## 2022-11-10 PROCEDURE — 96365 THER/PROPH/DIAG IV INF INIT: CPT

## 2022-11-10 PROCEDURE — 83036 HEMOGLOBIN GLYCOSYLATED A1C: CPT

## 2022-11-10 PROCEDURE — 83735 ASSAY OF MAGNESIUM: CPT

## 2022-11-10 PROCEDURE — 6360000002 HC RX W HCPCS: Performed by: FAMILY MEDICINE

## 2022-11-10 PROCEDURE — 84484 ASSAY OF TROPONIN QUANT: CPT

## 2022-11-10 PROCEDURE — 71275 CT ANGIOGRAPHY CHEST: CPT | Performed by: RADIOLOGY

## 2022-11-10 PROCEDURE — 80053 COMPREHEN METABOLIC PANEL: CPT

## 2022-11-10 PROCEDURE — 1210000000 HC MED SURG R&B

## 2022-11-10 PROCEDURE — 85025 COMPLETE CBC W/AUTO DIFF WBC: CPT

## 2022-11-10 PROCEDURE — 87635 SARS-COV-2 COVID-19 AMP PRB: CPT

## 2022-11-10 PROCEDURE — 6370000000 HC RX 637 (ALT 250 FOR IP): Performed by: PHYSICIAN ASSISTANT

## 2022-11-10 PROCEDURE — 70450 CT HEAD/BRAIN W/O DYE: CPT | Performed by: RADIOLOGY

## 2022-11-10 PROCEDURE — 70450 CT HEAD/BRAIN W/O DYE: CPT

## 2022-11-10 PROCEDURE — 6360000002 HC RX W HCPCS: Performed by: PHYSICIAN ASSISTANT

## 2022-11-10 PROCEDURE — 71045 X-RAY EXAM CHEST 1 VIEW: CPT

## 2022-11-10 PROCEDURE — 36415 COLL VENOUS BLD VENIPUNCTURE: CPT

## 2022-11-10 PROCEDURE — 72125 CT NECK SPINE W/O DYE: CPT | Performed by: RADIOLOGY

## 2022-11-10 PROCEDURE — 71045 X-RAY EXAM CHEST 1 VIEW: CPT | Performed by: RADIOLOGY

## 2022-11-10 PROCEDURE — 2580000003 HC RX 258: Performed by: PHYSICIAN ASSISTANT

## 2022-11-10 PROCEDURE — 85379 FIBRIN DEGRADATION QUANT: CPT

## 2022-11-10 PROCEDURE — 71275 CT ANGIOGRAPHY CHEST: CPT

## 2022-11-10 PROCEDURE — 96375 TX/PRO/DX INJ NEW DRUG ADDON: CPT

## 2022-11-10 PROCEDURE — 96361 HYDRATE IV INFUSION ADD-ON: CPT

## 2022-11-10 PROCEDURE — 72125 CT NECK SPINE W/O DYE: CPT

## 2022-11-10 RX ORDER — SODIUM CHLORIDE 9 MG/ML
INJECTION, SOLUTION INTRAVENOUS PRN
Status: DISCONTINUED | OUTPATIENT
Start: 2022-11-10 | End: 2022-11-12 | Stop reason: HOSPADM

## 2022-11-10 RX ORDER — ONDANSETRON 4 MG/1
4 TABLET, ORALLY DISINTEGRATING ORAL EVERY 8 HOURS PRN
Status: DISCONTINUED | OUTPATIENT
Start: 2022-11-10 | End: 2022-11-12 | Stop reason: HOSPADM

## 2022-11-10 RX ORDER — HEPARIN SODIUM 1000 [USP'U]/ML
80 INJECTION, SOLUTION INTRAVENOUS; SUBCUTANEOUS PRN
Status: DISCONTINUED | OUTPATIENT
Start: 2022-11-10 | End: 2022-11-11 | Stop reason: ALTCHOICE

## 2022-11-10 RX ORDER — ACETAMINOPHEN 500 MG
1000 TABLET ORAL ONCE
Status: COMPLETED | OUTPATIENT
Start: 2022-11-10 | End: 2022-11-10

## 2022-11-10 RX ORDER — HEPARIN SODIUM 1000 [USP'U]/ML
40 INJECTION, SOLUTION INTRAVENOUS; SUBCUTANEOUS PRN
Status: DISCONTINUED | OUTPATIENT
Start: 2022-11-10 | End: 2022-11-11 | Stop reason: ALTCHOICE

## 2022-11-10 RX ORDER — SODIUM CHLORIDE 0.9 % (FLUSH) 0.9 %
5-40 SYRINGE (ML) INJECTION EVERY 12 HOURS SCHEDULED
Status: DISCONTINUED | OUTPATIENT
Start: 2022-11-10 | End: 2022-11-12 | Stop reason: HOSPADM

## 2022-11-10 RX ORDER — SODIUM CHLORIDE 0.9 % (FLUSH) 0.9 %
5-40 SYRINGE (ML) INJECTION PRN
Status: DISCONTINUED | OUTPATIENT
Start: 2022-11-10 | End: 2022-11-12 | Stop reason: HOSPADM

## 2022-11-10 RX ORDER — 0.9 % SODIUM CHLORIDE 0.9 %
1000 INTRAVENOUS SOLUTION INTRAVENOUS ONCE
Status: COMPLETED | OUTPATIENT
Start: 2022-11-10 | End: 2022-11-10

## 2022-11-10 RX ORDER — ACETAMINOPHEN 325 MG/1
650 TABLET ORAL EVERY 4 HOURS PRN
Status: DISCONTINUED | OUTPATIENT
Start: 2022-11-10 | End: 2022-11-12 | Stop reason: HOSPADM

## 2022-11-10 RX ORDER — HEPARIN SODIUM 1000 [USP'U]/ML
80 INJECTION, SOLUTION INTRAVENOUS; SUBCUTANEOUS ONCE
Status: COMPLETED | OUTPATIENT
Start: 2022-11-10 | End: 2022-11-10

## 2022-11-10 RX ORDER — HEPARIN SODIUM 10000 [USP'U]/100ML
5-30 INJECTION, SOLUTION INTRAVENOUS CONTINUOUS
Status: DISCONTINUED | OUTPATIENT
Start: 2022-11-10 | End: 2022-11-11

## 2022-11-10 RX ORDER — ONDANSETRON 2 MG/ML
4 INJECTION INTRAMUSCULAR; INTRAVENOUS EVERY 6 HOURS PRN
Status: DISCONTINUED | OUTPATIENT
Start: 2022-11-10 | End: 2022-11-12 | Stop reason: HOSPADM

## 2022-11-10 RX ORDER — ENOXAPARIN SODIUM 100 MG/ML
30 INJECTION SUBCUTANEOUS 2 TIMES DAILY
Status: DISCONTINUED | OUTPATIENT
Start: 2022-11-10 | End: 2022-11-11 | Stop reason: SDUPTHER

## 2022-11-10 RX ORDER — HEPARIN SODIUM 5000 [USP'U]/ML
5000 INJECTION, SOLUTION INTRAVENOUS; SUBCUTANEOUS ONCE
Status: DISCONTINUED | OUTPATIENT
Start: 2022-11-10 | End: 2022-11-10

## 2022-11-10 RX ADMIN — HEPARIN SODIUM 9980 UNITS: 1000 INJECTION INTRAVENOUS; SUBCUTANEOUS at 21:43

## 2022-11-10 RX ADMIN — ENOXAPARIN SODIUM 30 MG: 100 INJECTION SUBCUTANEOUS at 23:37

## 2022-11-10 RX ADMIN — SODIUM CHLORIDE 1000 ML: 9 INJECTION, SOLUTION INTRAVENOUS at 19:12

## 2022-11-10 RX ADMIN — HEPARIN SODIUM 16 UNITS/KG/HR: 10000 INJECTION, SOLUTION INTRAVENOUS at 21:49

## 2022-11-10 RX ADMIN — ACETAMINOPHEN 1000 MG: 500 TABLET ORAL at 19:11

## 2022-11-10 ASSESSMENT — ENCOUNTER SYMPTOMS
SHORTNESS OF BREATH: 1
DIARRHEA: 0
EYES NEGATIVE: 1
ABDOMINAL PAIN: 0
CHEST TIGHTNESS: 1
BACK PAIN: 0
NAUSEA: 1
VOMITING: 0

## 2022-11-10 ASSESSMENT — PAIN SCALES - GENERAL
PAINLEVEL_OUTOF10: 6
PAINLEVEL_OUTOF10: 8

## 2022-11-10 ASSESSMENT — PAIN DESCRIPTION - DESCRIPTORS
DESCRIPTORS: ACHING
DESCRIPTORS: ACHING

## 2022-11-10 ASSESSMENT — PAIN DESCRIPTION - LOCATION
LOCATION: CHEST
LOCATION: HEAD

## 2022-11-10 ASSESSMENT — PAIN - FUNCTIONAL ASSESSMENT: PAIN_FUNCTIONAL_ASSESSMENT: 0-10

## 2022-11-10 NOTE — ED PROVIDER NOTES
[facility] [address]      Pt Name: Albin Lunsford  MRN: [id number]  [place] 1955  Date of evaluation: 11/10/2022  Provider: Ralph Lopez PA-C    CHIEF COMPLAINT       Chief Complaint   Patient presents with    Shortness of Breath     Pt arrived to the ed with c/o shortness of breath and chest discomfort that started at the beginning of the week and worse today. Pt also c/o numbness in left arm that started at the same time. HISTORY OF PRESENT ILLNESS   (Location/Symptom, Timing/Onset, Context/Setting, Quality, Duration, Modifying Factors, Severity)  Note limiting factors. HPI      Albin Lunsford is a 79 y.o. male who presents to the emergency department with SOB, chest pain. PMH significant for hypertension, Non-insulin-dependent diabetes, paroxysmal A. fib, sleep apnea, osteoarthritis. Patient states approximately 4 to 5 days ago he began developing shortness of breath both with rest and exertion, intermittent left arm tingling which is now consistent, as well as a generalized pressure in his head and feelings of intermittent pain shooting into his left neck. Patient states that he has never had these type of symptoms before and due to the persistence as well as increased frequency he became concerned. Patient states that the shortness of breath is now with an associated chest heaviness and discomfort in his lower chest.  Patient denies use of tobacco products, hormone products including testosterone, any recent surgical procedures or traveling in the past 6 weeks. Patient denies fevers, chills, diaphoresis, abdominal pain, nausea, vomiting, diarrhea, or any other focal/localized numbness or weakness. Patient denies any recent injuries including head injuries or neck injuries. Patient denies use of any medications for his symptoms and presents at this time for further evaluation.       Records reviewed show patient was last seen in the ED on 8/6/19 for acute non intractable headache, vomiting. Patient was last seen in the outpatient state in the PCP office on 7/15/2022 for paroxysmal A. fib, diabetes, osteoarthritis, hypertension, erectile dysfunction, lumbar vertebral disc disease. Nursing Notes were reviewed. REVIEW OF SYSTEMS    (2-9 systems for level 4, 10 or more for level 5)     Review of Systems   Constitutional: Negative. Negative for chills, diaphoresis and fever. HENT: Negative. Eyes: Negative. Respiratory:  Positive for chest tightness and shortness of breath. Cardiovascular:  Positive for chest pain (Generalized discomfort/heaviness in the lower chest). Negative for palpitations and leg swelling. Gastrointestinal:  Positive for nausea. Negative for abdominal pain, diarrhea and vomiting. Genitourinary: Negative. Musculoskeletal: Negative. Negative for back pain, neck pain and neck stiffness. Skin: Negative. Negative for wound. Allergic/Immunologic: Positive for immunocompromised state (non insulin dependent diabetes). Neurological:  Positive for numbness (left arm tinglying). Negative for weakness. Denies head injury   Psychiatric/Behavioral: Negative. All other systems reviewed and are negative. Except as noted above the remainder of the review of systems was reviewed and negative. PAST MEDICAL HISTORY     Past Medical History:   Diagnosis Date    Diabetes mellitus ([facility] [address]     Hyperlipidemia     Cholesterol management per his pcp.      Hypertension     Irregular heart beat     Paroxysmal atrial fibrillation (HCC)     Pilonidal cyst     Sleep apnea     does not use a machine         SURGICAL HISTORY       Past Surgical History:   Procedure Laterality Date    APPENDECTOMY      BACK SURGERY      CHOLECYSTECTOMY, LAPAROSCOPIC  10/16/03    w/lysis of adhesions and hernia repair-Dr Glory Sweet    COLONOSCOPY  10/5/10    Dr Tierney Blas-internal hemorrhoids, bx not available    COLONOSCOPY N/A 7/15/2016    Dr [address]laly [address]-normal, 10 yr recall    KNEE SURGERY Bilateral     PILONIDAL CYST EXCISION      UMBILICAL HERNIA REPAIR  10/16/03    w/qamar-Dr Joe Rey    UPPER GASTROINTESTINAL ENDOSCOPY N/A 7/15/2016    Dr JR Ley-(+) H Pylori, moderate chronic gastritis         CURRENT MEDICATIONS       Current Discharge Medication List        CONTINUE these medications which have NOT CHANGED    Details   empagliflozin (JARDIANCE) 25 MG tablet Take 25 mg by mouth daily      OLMESARTAN MEDOXOMIL-HCTZ PO Take by mouth      ondansetron (ZOFRAN ODT) 4 MG disintegrating tablet Take 1 tablet by mouth every 8 hours as needed for Nausea or Vomiting  Qty: 12 tablet, Refills: 0      propafenone (RYTHMOL) 300 MG tablet Take 300 mg by mouth every 8 hours      albuterol sulfate  (90 Base) MCG/ACT inhaler Inhale 2 puffs into the lungs every 6 hours as needed for Shortness of Breath  Qty: 1 Inhaler, Refills: 0      atorvastatin (LIPITOR) 40 MG tablet Take 40 mg by mouth daily       aspirin 81 MG EC tablet Take 81 mg by mouth daily. metformin (GLUCOPHAGE) 500 MG tablet Take 1,000 mg by mouth 2 times daily (with meals)              ALLERGIES     Patient has no known allergies.     FAMILY HISTORY       Family History   Problem Relation Age of Onset    Heart Failure Father     Heart Failure Mother     Stroke Mother     Breast Cancer Mother     Heart Disease Sister     Colon Cancer Neg Hx     Colon Polyps Neg Hx     Esophageal Cancer Neg Hx     Liver Cancer Neg Hx     Liver Disease Neg Hx     Rectal Cancer Neg Hx     Strabismus Neg Hx           SOCIAL HISTORY       Social History     Socioeconomic History    Marital status:      Spouse name: None    Number of children: None    Years of education: None    Highest education level: None   Tobacco Use    Smoking status: Former     Packs/day: 2.00     Years: 30.00     Pack years: 60.00     Types: Cigarettes     Quit date:      Years since quittin.8    Smokeless tobacco: Never Substance and Sexual Activity    Alcohol use: No    Drug use: No       SCREENINGS         Anali Coma Scale  Eye Opening: Spontaneous  Best Verbal Response: Oriented  Best Motor Response: Obeys commands  Anali Coma Scale Score: 15                     CIWA Assessment  BP: (!) 131/59  Heart Rate: 72                 PHYSICAL EXAM    (up to 7 for level 4, 8 or more for level 5)     ED Triage Vitals   BP Temp Temp Source Heart Rate Resp SpO2 Height Weight   11/10/22 1554 11/10/22 1554 11/10/22 1554 11/10/22 1554 11/10/22 1554 11/10/22 1554 11/10/22 1549 11/10/22 1549   136/66 97.7 °F (36.5 °C) Temporal 93 15 95 % 6' 2\" (1.88 m) 275 lb (124.7 kg)       Physical Exam  Vitals and nursing note reviewed. Constitutional:       General: He is not in acute distress. Appearance: Normal appearance. He is well-developed and well-groomed. He is obese. He is not toxic-appearing or diaphoretic. HENT:      Head: Normocephalic. Mouth/Throat:      Mouth: Mucous membranes are moist.      Pharynx: Oropharynx is clear. Eyes:      Conjunctiva/sclera: Conjunctivae normal.      Pupils: Pupils are equal, round, and reactive to light. Cardiovascular:      Rate and Rhythm: Regular rhythm. Tachycardia present. Comments: No calf tenderness bilaterally  Pulmonary:      Effort: Pulmonary effort is normal.      Breath sounds: Normal breath sounds. Chest:      Chest wall: No tenderness. Abdominal:      General: Bowel sounds are normal.      Palpations: Abdomen is soft. Tenderness: There is no abdominal tenderness. There is no right CVA tenderness, left CVA tenderness or guarding. Musculoskeletal:         General: No tenderness or signs of injury. Normal range of motion. Cervical back: Normal range of motion and neck supple. No rigidity or tenderness. Right lower leg: No edema. Left lower leg: No edema. Lymphadenopathy:      Cervical: No cervical adenopathy.    Skin:     General: Skin is warm and dry.   Neurological:      General: No focal deficit present. Mental Status: He is alert and oriented to person, place, and time. Mental status is at baseline. Sensory: No sensory deficit. Motor: No weakness. Gait: Gait normal.   Psychiatric:         Mood and Affect: Mood normal.         Behavior: Behavior normal. Behavior is cooperative. DIAGNOSTIC RESULTS     EKG: All EKG's are interpreted by the Emergency Department Physician who either signs or Co-signs this chart in the absence of a cardiologist.        RADIOLOGY:   Non-plain film images such as CT, Ultrasound and MRI are read by the radiologist. Plain radiographic images are visualized and preliminarily interpreted by the emergency physician with the below findings:        Interpretation per the Radiologist below, if available at the time of this note:    CTA PULMONARY W CONTRAST   Final Result   1. Several partially occlusive filling defect within the right upper, middle and lower segmental pulmonary arteries consistent with acute pulmonary emboli. 2. Partial visualization of hepatosplenomegaly. Recommendation: Follow up as clinically indicated. All CT scans at this facility utilize dose modulation, iterative reconstruction, and/or weight based dosing when appropriate to reduce radiation dose to as low as reasonably achievable. Electronically Signed by Mallorie Shi MD at 10-Nov-2022 08:34:10 PM EST               CT CERVICAL SPINE WO CONTRAST   Final Result   1. No acute osseous abnormality. 2. At C3-C4: Moderate disc space narrowing with posterior bulging disc (3 mm) with posterior and uncovertebral spurring with moderate spinal canal and foraminal stenosis probably abutting the nerve roots. Recommendation: Follow up as clinically indicated.     All CT scans at this facility utilize dose modulation, iterative reconstruction, and/or weight based dosing when appropriate to reduce radiation dose to as low as reasonably achievable. Electronically Signed by Machelle Lindsey MD at [id number] 08:28:17 PM EST               CT HEAD WO CONTRAST   Final Result       1. No evidence of acute bleed, no mass or mass effect, no hypodense lobar infarct. 2.  Subtle hypodensity in left high subcortical parietal region could represent acute and/or small vessel changes. Could correlate with MR brain. Recommendation: Follow up as clinically indicated. All CT scans at this facility utilize dose modulation, iterative reconstruction, and/or weight based dosing when appropriate to reduce radiation dose to as low as reasonably achievable. Electronically Signed by Machelle Lindsey MD at [id number] 08:38:48 PM EST               XR CHEST PORTABLE   Final Result   No focal pneumonic consolidation, no pleural effusion or yehuda CHF. Recommendation: Follow up as clinically indicated.     Electronically Signed by Machelle Lindsey MD at [id number] 07:27:21 PM EST                     LABS:  Labs Reviewed   CBC WITH AUTO DIFFERENTIAL - Abnormal; Notable for the following components:       Result Value    WBC 16.3 (*)     RBC 6.29 (*)     Hematocrit 54.2 (*)     MCH 26.9 (*)     MCHC 31.2 (*)     RDW 17.1 (*)     Platelets 412 (*)     Neutrophils % 81.5 (*)     Lymphocytes % 9.5 (*)     Neutrophils Absolute 13.3 (*)     Monocytes Absolute 1.30 (*)     All other components within normal limits   COMPREHENSIVE METABOLIC PANEL W/ REFLEX TO MG FOR LOW K - Abnormal; Notable for the following components:    CO2 21 (*)     Glucose 235 (*)     BUN 25 (*)     All other components within normal limits   D-DIMER, QUANTITATIVE - Abnormal; Notable for the following components:    D-Dimer, Quant 2.56 (*)     All other components within normal limits   HEMOGLOBIN A1C - Abnormal; Notable for the following components:    Hemoglobin A1C 8.2 (*)     All other components within normal limits   COVID-19, RAPID   TROPONIN   MAGNESIUM   TROPONIN   CBC   APTT   APTT All other labs were within normal range or not returned as of this dictation. EMERGENCY DEPARTMENT COURSE and DIFFERENTIAL DIAGNOSIS/MDM:   Vitals:    Vitals:    11/10/22 1930 11/10/22 2030 11/10/22 2145 11/10/22 2230   BP: (!) 114/52  (!) 118/58 (!) 131/59   Pulse: 73  72 72   Resp: 18  15 16   Temp:   97.8 °F (36.6 °C) 97.2 °F (36.2 °C)   TempSrc:   Oral Temporal   SpO2:  94%  92%   Weight:       Height:               MDM     Amount and/or Complexity of Data Reviewed  Clinical lab tests: reviewed and ordered  Tests in the radiology section of CPT®: reviewed and ordered  Tests in the medicine section of CPT®: ordered and reviewed  Decide to obtain previous medical records or to obtain history from someone other than the patient: yes  Review and summarize past medical records: yes  Discuss the patient with other providers: yes (Dr. Chelly Cheng (attending)  Mrs. Jeremías DOVER for PCP)    Patient Progress  Patient progress: stable        REASSESSMENT     ED Course as of 11/11/22 0007   Thu Nov 10, 2022   2037 Phone discussion with JAZZMINE Valle for Dr. Oumou Cole. Will kindly accept patient for admission under the services of Dr. Oumou Cole. [JS]      ED Course User Index  [JS] Tomas Haines PA-C       CONSULTS:  None    PROCEDURES:  Unless otherwise noted below, none     Procedures      Hayley Whitman is a 79 y.o. male who presents to the emergency department with SOB, chest pain. PMH significant for hypertension, Non-insulin-dependent diabetes, paroxysmal A. fib, sleep apnea, osteoarthritis. Work-up revealed leukocytosis 16.3, hyperglycemia 235 with hemoglobin A1c 8.2. D-dimer elevated at 2.56 for which further CT imaging was ordered. Troponin negative x2, magnesium normal, COVID testing negative. CBC and CMP otherwise With no further abnormalities. Head CT without contrast showed no evidence of acute bleed, mass, mass-effect.   Subtle hypodensity in the left high subcortical parietal region could represent acute and/or small vessel changes. Cervical spine CT showed no acute findings, disc space narrowing with posterior bulging disc at C3-C4. Chest x-ray showed no acute findings. Chest CTA showed: Several partially occlusive filling defect within the right upper, middle, lower segmental pulmonary arteries consistent with acute pulmonary emboli, partial visualization of hepatosplenomegaly. Patient was given initial dose of heparin and advised on need for admission for further evaluation, treatment, and monitoring for which he is amenable. Case discussed with JAZZMINE Celestin on-call for primary care provider who will kindly accept patient for admission under the services of Dr. Oumou Cole at this time. FINAL IMPRESSION      1. Multiple subsegmental pulmonary emboli without acute cor pulmonale (HCC)    2. Bulging of cervical intervertebral disc          DISPOSITION/PLAN   DISPOSITION Admitted 11/10/2022 10:14:47 PM      PATIENT REFERRED TO:  No follow-up provider specified. DISCHARGE MEDICATIONS:  Current Discharge Medication List        Controlled Substances Monitoring:     No flowsheet data found.     (Please note that portions of this note were completed with a voice recognition program.  Efforts were made to edit the dictations but occasionally words are mis-transcribed.)    Tomas Haines PA-C (electronically signed)           Tomas Haines PA-C  11/11/22 0007

## 2022-11-11 PROBLEM — M50.90 CERVICAL DISC DISORDER: Status: ACTIVE | Noted: 2022-11-11

## 2022-11-11 LAB
APTT: 135.3 SEC (ref 26–36.2)
GLUCOSE BLD-MCNC: 132 MG/DL (ref 70–99)
GLUCOSE BLD-MCNC: 144 MG/DL (ref 70–99)
GLUCOSE BLD-MCNC: 179 MG/DL (ref 70–99)
HBA1C MFR BLD: 8 % (ref 4–6)
HCT VFR BLD CALC: 48.2 % (ref 42–52)
HEMOGLOBIN: 15.3 G/DL (ref 14–18)
LV EF: 63 %
LVEF MODALITY: NORMAL
MCH RBC QN AUTO: 27.2 PG (ref 27–31)
MCHC RBC AUTO-ENTMCNC: 31.7 G/DL (ref 33–37)
MCV RBC AUTO: 85.6 FL (ref 80–94)
PDW BLD-RTO: 15.9 % (ref 11.5–14.5)
PERFORMED ON: ABNORMAL
PLATELET # BLD: 106 K/UL (ref 130–400)
PMV BLD AUTO: 12.6 FL (ref 9.4–12.4)
RBC # BLD: 5.63 M/UL (ref 4.7–6.1)
TROPONIN: <0.01 NG/ML (ref 0–0.03)
WBC # BLD: 10.1 K/UL (ref 4.8–10.8)

## 2022-11-11 PROCEDURE — 85027 COMPLETE CBC AUTOMATED: CPT

## 2022-11-11 PROCEDURE — 93970 EXTREMITY STUDY: CPT

## 2022-11-11 PROCEDURE — 6370000000 HC RX 637 (ALT 250 FOR IP): Performed by: FAMILY MEDICINE

## 2022-11-11 PROCEDURE — 85730 THROMBOPLASTIN TIME PARTIAL: CPT

## 2022-11-11 PROCEDURE — 84484 ASSAY OF TROPONIN QUANT: CPT

## 2022-11-11 PROCEDURE — 6360000002 HC RX W HCPCS: Performed by: PHYSICIAN ASSISTANT

## 2022-11-11 PROCEDURE — 1210000000 HC MED SURG R&B

## 2022-11-11 PROCEDURE — 83036 HEMOGLOBIN GLYCOSYLATED A1C: CPT

## 2022-11-11 PROCEDURE — 36415 COLL VENOUS BLD VENIPUNCTURE: CPT

## 2022-11-11 PROCEDURE — 2580000003 HC RX 258: Performed by: FAMILY MEDICINE

## 2022-11-11 PROCEDURE — 6360000004 HC RX CONTRAST MEDICATION: Performed by: FAMILY MEDICINE

## 2022-11-11 PROCEDURE — 82947 ASSAY GLUCOSE BLOOD QUANT: CPT

## 2022-11-11 PROCEDURE — C8929 TTE W OR WO FOL WCON,DOPPLER: HCPCS

## 2022-11-11 RX ORDER — LOSARTAN POTASSIUM 100 MG/1
100 TABLET ORAL DAILY
Status: DISCONTINUED | OUTPATIENT
Start: 2022-11-11 | End: 2022-11-12 | Stop reason: HOSPADM

## 2022-11-11 RX ORDER — PROPAFENONE HYDROCHLORIDE 150 MG/1
300 TABLET, FILM COATED ORAL EVERY 8 HOURS
Status: DISCONTINUED | OUTPATIENT
Start: 2022-11-11 | End: 2022-11-12 | Stop reason: HOSPADM

## 2022-11-11 RX ORDER — OLMESARTAN MEDOXOMIL AND HYDROCHLOROTHIAZIDE 40/12.5 40; 12.5 MG/1; MG/1
1 TABLET ORAL DAILY
Status: DISCONTINUED | OUTPATIENT
Start: 2022-11-11 | End: 2022-11-11

## 2022-11-11 RX ORDER — DEXTROSE MONOHYDRATE 100 MG/ML
INJECTION, SOLUTION INTRAVENOUS CONTINUOUS PRN
Status: DISCONTINUED | OUTPATIENT
Start: 2022-11-11 | End: 2022-11-12 | Stop reason: HOSPADM

## 2022-11-11 RX ORDER — ATORVASTATIN CALCIUM 40 MG/1
40 TABLET, FILM COATED ORAL DAILY
Status: DISCONTINUED | OUTPATIENT
Start: 2022-11-11 | End: 2022-11-12 | Stop reason: HOSPADM

## 2022-11-11 RX ORDER — INSULIN LISPRO 100 [IU]/ML
0-4 INJECTION, SOLUTION INTRAVENOUS; SUBCUTANEOUS NIGHTLY
Status: DISCONTINUED | OUTPATIENT
Start: 2022-11-11 | End: 2022-11-12 | Stop reason: HOSPADM

## 2022-11-11 RX ORDER — INSULIN LISPRO 100 [IU]/ML
0-4 INJECTION, SOLUTION INTRAVENOUS; SUBCUTANEOUS
Status: DISCONTINUED | OUTPATIENT
Start: 2022-11-11 | End: 2022-11-12 | Stop reason: HOSPADM

## 2022-11-11 RX ORDER — HYDROCHLOROTHIAZIDE 12.5 MG/1
12.5 CAPSULE, GELATIN COATED ORAL DAILY
Status: DISCONTINUED | OUTPATIENT
Start: 2022-11-11 | End: 2022-11-12 | Stop reason: HOSPADM

## 2022-11-11 RX ADMIN — PROPAFENONE HYDROCHLORIDE 300 MG: 150 TABLET, FILM COATED ORAL at 08:59

## 2022-11-11 RX ADMIN — ATORVASTATIN CALCIUM 40 MG: 40 TABLET, FILM COATED ORAL at 08:59

## 2022-11-11 RX ADMIN — APIXABAN 10 MG: 5 TABLET, FILM COATED ORAL at 09:02

## 2022-11-11 RX ADMIN — LOSARTAN POTASSIUM 100 MG: 100 TABLET, FILM COATED ORAL at 08:59

## 2022-11-11 RX ADMIN — APIXABAN 10 MG: 5 TABLET, FILM COATED ORAL at 22:21

## 2022-11-11 RX ADMIN — PERFLUTREN 1.5 ML: 6.52 INJECTION, SUSPENSION INTRAVENOUS at 11:04

## 2022-11-11 RX ADMIN — EMPAGLIFLOZIN 25 MG: 25 TABLET, FILM COATED ORAL at 08:59

## 2022-11-11 RX ADMIN — HYDROCHLOROTHIAZIDE 12.5 MG: 12.5 CAPSULE ORAL at 08:58

## 2022-11-11 RX ADMIN — SODIUM CHLORIDE, PRESERVATIVE FREE 10 ML: 5 INJECTION INTRAVENOUS at 21:00

## 2022-11-11 RX ADMIN — METFORMIN HYDROCHLORIDE 1000 MG: 500 TABLET ORAL at 08:59

## 2022-11-11 RX ADMIN — METFORMIN HYDROCHLORIDE 1000 MG: 500 TABLET ORAL at 16:41

## 2022-11-11 RX ADMIN — PROPAFENONE HYDROCHLORIDE 300 MG: 150 TABLET, FILM COATED ORAL at 16:41

## 2022-11-11 NOTE — PROGRESS NOTES
Preliminary report    No DVT or SVT noted in BLE'S at this time  Enlarged lymph nodes noted in right groin area= 3.8 x .80 cm  Bakers cyst noted in right popliteal space= 6.5 x 1.3 cm  ??? Bakers cyst noted in left popliteal space= 5 x 3 cm    Pending final report

## 2022-11-11 NOTE — PROGRESS NOTES
Arrived to room 821 via stretcher bed. Heparin gtt infusing per Right forearm. Alert & oriented. No needs identified.

## 2022-11-11 NOTE — H&P
CHIEF COMPLAINT: Shortness of breath    History Obtained From:  patient     HISTORY OF PRESENT ILLNESS:      The patient is a 79 y.o. male with significant past medical history of hypertension, type 2 diabetes mellitus, distant history of paroxysmal atrial fibrillation, and obesity who presents with acute onset of shortness of breath starting approximately 4 days ago. States he woke up and noticed it more difficult to breathe. Had some mild chest discomfort. It was not severe enough to seek medical attention. Continued through the week and worsened yesterday to involve some tingling of the left upper extremity. He thought at this point it would be best for him to present to the emergency room for further evaluation. Work-up there included a CTA of the chest which showed several subsegmental filling defects consistent with pulmonary emboli. Denies any pain in his lower extremities or swelling. Has taken no long trips or had any immobility for any length of time. States he has been in his normal routine of activity without interruption. Also denies any significant upper or lower extremity injuries. Past Medical History:   Diagnosis Date    Diabetes mellitus ([address]     Hyperlipidemia     Cholesterol management per his pcp.      Hypertension     Irregular heart beat     Paroxysmal atrial fibrillation (HCC)     Pilonidal cyst     Sleep apnea     does not use a machine       Past Surgical History:   Procedure Laterality Date    APPENDECTOMY      BACK SURGERY      CHOLECYSTECTOMY, LAPAROSCOPIC  10/16/03    w/lysis of adhesions and hernia repair-Dr Fadi Frankel    COLONOSCOPY  10/5/10    Dr Joseluis Blas-internal hemorrhoids, bx not available    COLONOSCOPY N/A 7/15/2016    Dr Zachariah Felix, 10 yr recall    KNEE SURGERY Bilateral     PILONIDAL CYST EXCISION      UMBILICAL HERNIA REPAIR  10/16/03    w/qamar-Dr Fadi Frankel    UPPER GASTROINTESTINAL ENDOSCOPY N/A 7/15/2016    Dr JR Ley-(+) H Pylori, moderate chronic gastritis Medications Prior to Admission:    Medications Prior to Admission: empagliflozin (JARDIANCE) 25 MG tablet, Take 25 mg by mouth daily  OLMESARTAN MEDOXOMIL-HCTZ PO, Take by mouth  ondansetron (ZOFRAN ODT) 4 MG disintegrating tablet, Take 1 tablet by mouth every 8 hours as needed for Nausea or Vomiting  propafenone (RYTHMOL) 300 MG tablet, Take 300 mg by mouth every 8 hours  albuterol sulfate  (90 Base) MCG/ACT inhaler, Inhale 2 puffs into the lungs every 6 hours as needed for Shortness of Breath (Patient not taking: Reported on 11/10/2022)  atorvastatin (LIPITOR) 40 MG tablet, Take 40 mg by mouth daily   aspirin 81 MG EC tablet, Take 81 mg by mouth daily. metformin (GLUCOPHAGE) 500 MG tablet, Take 1,000 mg by mouth 2 times daily (with meals)     Allergies:    Patient has no known allergies. Social History:    reports that he quit smoking about 42 years ago. He has a 60.00 pack-year smoking history. He has never used smokeless tobacco. He reports that he does not drink alcohol and does not use drugs. Family History:   family history includes Breast Cancer in his mother; Heart Disease in his sister; Heart Failure in his father and mother; Stroke in his mother. REVIEW OF SYSTEMS:  As above in the HPI, otherwise negative    PHYSICAL EXAM:    Vitals:  BP (!) 113/55   Pulse 72   Temp (!) 96.6 °F (35.9 °C) (Temporal)   Resp 16   Ht 6' 2\" (1.88 m)   Wt 275 lb (124.7 kg)   SpO2 92%   BMI 35.31 kg/m²     General Appearance:  awake, alert, oriented, in no acute distress and obese  Skin:  Skin color, texture, turgor normal. No rashes or lesions. Head/face:  NCAT  Eyes:  No gross abnormalities. Mouth/Throat:  Mucosa moist.  No lesions. Pharynx without erythema, edema or exudate. Neck:  neck- supple, no mass, non-tender  Lungs:  Normal expansion. Clear to auscultation. No rales, rhonchi, or wheezing.   Heart:  Heart sounds are normal.  Regular rate and rhythm without murmur, gallop or rub.  Abdomen:  Soft, non-tender, normal bowel sounds. No bruits, organomegaly or masses. Extremities: Extremities warm to touch, pink, with no edema. Musculoskeletal:  negative  Neurologic:  negative    DATA:  CBC with Differential:    Lab Results   Component Value Date/Time    WBC 10.1 11/11/2022 12:40 AM    RBC 5.63 11/11/2022 12:40 AM    HGB 15.3 11/11/2022 12:40 AM    HCT 48.2 11/11/2022 12:40 AM     11/11/2022 12:40 AM    MCV 85.6 11/11/2022 12:40 AM    MCH 27.2 11/11/2022 12:40 AM    MCHC 31.7 11/11/2022 12:40 AM    RDW 15.9 11/11/2022 12:40 AM    BANDSPCT 2 09/26/2017 02:34 AM    LYMPHOPCT 9.5 11/10/2022 04:41 PM    MONOPCT 8.1 11/10/2022 04:41 PM    MYELOPCT 1 09/24/2017 02:20 AM    BASOPCT 0.2 11/10/2022 04:41 PM    MONOSABS 1.30 11/10/2022 04:41 PM    LYMPHSABS 1.5 11/10/2022 04:41 PM    EOSABS 0.10 11/10/2022 04:41 PM    BASOSABS 0.00 11/10/2022 04:41 PM     CMP:    Lab Results   Component Value Date/Time     11/10/2022 04:41 PM    K 4.4 11/10/2022 04:41 PM     11/10/2022 04:41 PM    CO2 21 11/10/2022 04:41 PM    BUN 25 11/10/2022 04:41 PM    CREATININE 1.0 11/10/2022 04:41 PM    LABGLOM >60 11/10/2022 04:41 PM    GLUCOSE 235 11/10/2022 04:41 PM    PROT 7.1 11/10/2022 04:41 PM    LABALBU 4.5 11/10/2022 04:41 PM    CALCIUM 9.6 11/10/2022 04:41 PM    BILITOT 0.9 11/10/2022 04:41 PM    ALKPHOS 103 11/10/2022 04:41 PM    AST 19 11/10/2022 04:41 PM    ALT 27 11/10/2022 04:41 PM     PTT:    Lab Results   Component Value Date/Time    APTT 135.3 11/11/2022 03:40 AM    PTT 24.0 04/04/2013 12:16 PM   [APTT}  Radiology Review: CTA shows subsegmental filling defects consistent with pulmonary emboli.   CT of the neck shows significant disc derangement with some moderate neural impingement at C3-4    ASSESSMENT:      Patient Active Problem List   Diagnosis    Pilonidal cyst    Diabetes mellitus (HCC)    Hyperlipidemia    Hypertension    Paroxysmal atrial fibrillation (HCC)    Dysphagia Dyspepsia    Helicobacter pylori (H. pylori) infection    Acute respiratory failure with hypoxia (HCC)    Pneumonia of left lower lobe due to infectious organism    Bacteremia due to Gram-negative bacteria    Sepsis due to Haemophilus influenzae ([address]    Pulmonary embolism without acute cor pulmonale, unspecified chronicity, unspecified pulmonary embolism type (HCC)    Cervical disc disorder       PLAN:    Continue with anticoagulation but transition him off heparin drip and place him on oral anticoagulation as this will be appropriate in this situation. No overt signs or symptoms of cor pulmonale. I will check an echo to rule out any right heart strain. Check Doppler studies of his lower extremity to further assess any unknown clot burden he may have. He is not requiring oxygen. If work-up is benign and we get him started on oral anticoagulation there is a possibility he could be discharged over the weekend. At some point it might be warranted to get a hypercoagulable work-up but not while on anticoagulation. If warranted will consider consultation to pulmonology and/or vascular, but at this point I do not see it necessary.

## 2022-11-11 NOTE — PROGRESS NOTES
Nurse went to room to resume heparin drip, but pt states he has to go bathroom and can't wait any longer. Nurse assisted pt to bathroom. Will resume drip when pt gets back to bed.  Electronically signed by Lakeshia Sesay RN on 11/11/2022 at 5:42 AM

## 2022-11-12 VITALS
SYSTOLIC BLOOD PRESSURE: 138 MMHG | TEMPERATURE: 97.5 F | WEIGHT: 275 LBS | RESPIRATION RATE: 16 BRPM | BODY MASS INDEX: 35.29 KG/M2 | DIASTOLIC BLOOD PRESSURE: 73 MMHG | HEART RATE: 69 BPM | OXYGEN SATURATION: 95 % | HEIGHT: 74 IN

## 2022-11-12 LAB
GLUCOSE BLD-MCNC: 127 MG/DL (ref 70–99)
GLUCOSE BLD-MCNC: 191 MG/DL (ref 70–99)
HCT VFR BLD CALC: 50.2 % (ref 42–52)
HEMOGLOBIN: 15.5 G/DL (ref 14–18)
MCH RBC QN AUTO: 26.8 PG (ref 27–31)
MCHC RBC AUTO-ENTMCNC: 30.9 G/DL (ref 33–37)
MCV RBC AUTO: 86.9 FL (ref 80–94)
PDW BLD-RTO: 16.3 % (ref 11.5–14.5)
PERFORMED ON: ABNORMAL
PERFORMED ON: ABNORMAL
PLATELET # BLD: 119 K/UL (ref 130–400)
PMV BLD AUTO: 12 FL (ref 9.4–12.4)
RBC # BLD: 5.78 M/UL (ref 4.7–6.1)
WBC # BLD: 7.8 K/UL (ref 4.8–10.8)

## 2022-11-12 PROCEDURE — 82947 ASSAY GLUCOSE BLOOD QUANT: CPT

## 2022-11-12 PROCEDURE — 6370000000 HC RX 637 (ALT 250 FOR IP): Performed by: FAMILY MEDICINE

## 2022-11-12 PROCEDURE — 85027 COMPLETE CBC AUTOMATED: CPT

## 2022-11-12 PROCEDURE — 2580000003 HC RX 258: Performed by: FAMILY MEDICINE

## 2022-11-12 PROCEDURE — 36415 COLL VENOUS BLD VENIPUNCTURE: CPT

## 2022-11-12 RX ADMIN — EMPAGLIFLOZIN 25 MG: 25 TABLET, FILM COATED ORAL at 08:29

## 2022-11-12 RX ADMIN — APIXABAN 10 MG: 5 TABLET, FILM COATED ORAL at 08:29

## 2022-11-12 RX ADMIN — PROPAFENONE HYDROCHLORIDE 300 MG: 150 TABLET, FILM COATED ORAL at 08:29

## 2022-11-12 RX ADMIN — LOSARTAN POTASSIUM 100 MG: 100 TABLET, FILM COATED ORAL at 08:29

## 2022-11-12 RX ADMIN — HYDROCHLOROTHIAZIDE 12.5 MG: 12.5 CAPSULE ORAL at 08:29

## 2022-11-12 RX ADMIN — PROPAFENONE HYDROCHLORIDE 300 MG: 150 TABLET, FILM COATED ORAL at 00:48

## 2022-11-12 RX ADMIN — ATORVASTATIN CALCIUM 40 MG: 40 TABLET, FILM COATED ORAL at 08:29

## 2022-11-12 RX ADMIN — METFORMIN HYDROCHLORIDE 1000 MG: 500 TABLET ORAL at 08:29

## 2022-11-12 RX ADMIN — SODIUM CHLORIDE, PRESERVATIVE FREE 10 ML: 5 INJECTION INTRAVENOUS at 08:30

## 2022-11-12 NOTE — DISCHARGE SUMMARY
Hospital Discharge Summary    Livia Cruz  :  1955  MRN:  [id number]    Admit date:  11/10/2022  Discharge date:      Admitting Physician:    Marycarmen Marley MD    Discharge Diagnoses:    Principal Problem:    Pulmonary embolism without acute cor pulmonale, unspecified chronicity, unspecified pulmonary embolism type [facility])  Active Problems:    Cervical disc disorder    Diabetes mellitus ([facility])    Hyperlipidemia    Hypertension    Paroxysmal atrial fibrillation ([facility])  Resolved Problems:    * No resolved hospital problems. [facility] Course:   He was admitted with SOA, elevated D Dimer and on CTA was found to have PE. He was initially started on Heparin, but has been converted to Eliquis with no issues. He has had no O2 requirement. He would like to go home. His VS are stable. He is ambulating. He is eating well. He has some issues that need to be addressed by Dr. Guero Moore on d/c f/u, including lymph nodes in groin seen on venous US, and HSM seen incompletely on CTA. Discharge Medications:         Medication List        START taking these medications      * apixaban 5 MG Tabs tablet  Commonly known as: ELIQUIS  Take 2 tablets by mouth 2 times daily for 11 doses     * apixaban 5 MG Tabs tablet  Commonly known as: ELIQUIS  Take 1 tablet by mouth 2 times daily  Start taking on: 2022           * This list has 2 medication(s) that are the same as other medications prescribed for you. Read the directions carefully, and ask your doctor or other care provider to review them with you.                 CONTINUE taking these medications      albuterol sulfate  (90 Base) MCG/ACT inhaler  Commonly known as: PROVENTIL;VENTOLIN;PROAIR  Inhale 2 puffs into the lungs every 6 hours as needed for Shortness of Breath     aspirin 81 MG EC tablet     atorvastatin 40 MG tablet  Commonly known as: LIPITOR     Jardiance 25 MG tablet  Generic drug: empagliflozin     metFORMIN 500 MG tablet  Commonly known as: GLUCOPHAGE     OLMESARTAN MEDOXOMIL-HCTZ PO     ondansetron 4 MG disintegrating tablet  Commonly known as: Zofran ODT  Take 1 tablet by mouth every 8 hours as needed for Nausea or Vomiting     propafenone 300 MG tablet  Commonly known as: RYTHMOL               Where to Get Your Medications        These medications were sent to [facility] #[id number], [address]  [address] [facility], [address] [place] [id number]      Phone: [phone]   apixaban 5 MG Tabs tablet  apixaban 5 MG Tabs tablet         Consults:  none    Significant Diagnostic Studies:  see complete admission record      Disposition:   home in stable condition  Follow up with Marc Orozco MD in 1-2 weeks. Diet: carb control    Activity: as tolerated    Special Instructions: monitor for any bleeding      The patient or family are to call or return if there are any problems, questions, concerns or change in his condition.      Signed:  Cedric Magaña MD MD  11/12/2022, 12:59 PM

## 2022-11-12 NOTE — PROGRESS NOTES
[address] to check patient copay for Eliquis prescription. Per pharmacist, the copay would be $141/month. He stated that they did not have the starter pack of Eliquis that would allow him to get the 30 day free trial.    Called around to different pharmacies, [address] in [address] [place] [address] had a starter pack available but Dr. Gualberto Mulligan was unable to switch to that pharmacy after having already sent it through. Called [facility] again and he indicated that they would need a new script to be sent over to that pharmacy. Patient was getting restless as his daughter was here to pick him up. Tried to advise him to wait until this nurse could get the prescription sorted out. He refused to wait. Patient was given a coupon for 30 day free trial of Eliquis, along with a $10 copay card to apply for. He was given instructions on using this at the pharmacy and to follow up immediately with Dr. Tashia Samuels if he ran into any issues.      Electronically signed by Silvana Escalante RN on 11/12/2022 at 3:02 PM

## 2022-11-23 ENCOUNTER — TRANSCRIBE ORDERS (OUTPATIENT)
Dept: ADMINISTRATIVE | Facility: HOSPITAL | Age: 67
End: 2022-11-23

## 2022-11-23 DIAGNOSIS — R59.0 LOCALIZED ENLARGED LYMPH NODES: Primary | ICD-10-CM

## 2022-11-29 ENCOUNTER — HOSPITAL ENCOUNTER (OUTPATIENT)
Dept: ULTRASOUND IMAGING | Facility: HOSPITAL | Age: 67
Discharge: HOME | End: 2022-11-29
Admitting: FAMILY MEDICINE

## 2022-11-29 DIAGNOSIS — R59.0 LOCALIZED ENLARGED LYMPH NODES: ICD-10-CM

## 2022-11-29 PROCEDURE — 76857 US EXAM PELVIC LIMITED: CPT

## 2023-03-09 ENCOUNTER — OFFICE VISIT (OUTPATIENT)
Dept: CARDIOLOGY | Facility: CLINIC | Age: 68
End: 2023-03-09
Payer: MEDICARE

## 2023-03-09 VITALS
HEART RATE: 70 BPM | OXYGEN SATURATION: 94 % | SYSTOLIC BLOOD PRESSURE: 130 MMHG | HEIGHT: 74 IN | WEIGHT: 284 LBS | BODY MASS INDEX: 36.45 KG/M2 | DIASTOLIC BLOOD PRESSURE: 58 MMHG

## 2023-03-09 DIAGNOSIS — I48.0 PAROXYSMAL ATRIAL FIBRILLATION: ICD-10-CM

## 2023-03-09 DIAGNOSIS — I10 PRIMARY HYPERTENSION: Primary | ICD-10-CM

## 2023-03-09 DIAGNOSIS — E11.9 TYPE 2 DIABETES MELLITUS WITHOUT COMPLICATION, WITHOUT LONG-TERM CURRENT USE OF INSULIN: ICD-10-CM

## 2023-03-09 DIAGNOSIS — E78.2 MIXED HYPERLIPIDEMIA: ICD-10-CM

## 2023-03-09 PROCEDURE — 3075F SYST BP GE 130 - 139MM HG: CPT | Performed by: EMERGENCY MEDICINE

## 2023-03-09 PROCEDURE — 99204 OFFICE O/P NEW MOD 45 MIN: CPT | Performed by: EMERGENCY MEDICINE

## 2023-03-09 PROCEDURE — 3078F DIAST BP <80 MM HG: CPT | Performed by: EMERGENCY MEDICINE

## 2023-03-09 RX ORDER — OLMESARTAN MEDOXOMIL AND HYDROCHLOROTHIAZIDE 40/12.5 40; 12.5 MG/1; MG/1
1 TABLET ORAL DAILY
COMMUNITY
Start: 2023-02-03

## 2023-03-09 RX ORDER — PROPAFENONE HYDROCHLORIDE 300 MG/1
TABLET, COATED ORAL
COMMUNITY

## 2023-03-09 RX ORDER — ASPIRIN 81 MG/1
TABLET, CHEWABLE ORAL
COMMUNITY

## 2023-03-09 RX ORDER — ATORVASTATIN CALCIUM 40 MG/1
TABLET, FILM COATED ORAL
COMMUNITY

## 2023-03-09 NOTE — PROGRESS NOTES
"Chief Complaint  Surgical Clearance (NEW PT/ DR. SINGLETARY ORTHO)    Subjective        Bella Ordaz presents to [facility]ARD[facility] [place]  History of Present Illness    Bella Ordaz is a 67 y.o. male is referred by Dr. Singletary for Pre-op evaluation.     The patient reports Dr. Elias prescribed him propafenone for his heart \" skipping a little\" with improvement. He describes he will be participating in knee arthroplasty with Dr. Singletary. He states he had a thrombus in 2022. He notes he was given Eliquis, and advised to stay on it for 1 year. He states he is not experiecing any problems. The patient denies chest pain, shortness of breath with movement. He denies palpitations, dizziness, and syncope.     Objective   Vital Signs:  /58   Pulse 70   Ht 188 cm (74\")   Wt 129 kg (284 lb)   SpO2 94%   BMI 36.46 kg/m²   Estimated body mass index is 36.46 kg/m² as calculated from the following:    Height as of this encounter: 188 cm (74\").    Weight as of this encounter: 129 kg (284 lb).             Physical Exam  Constitutional:       Appearance: Normal appearance.   HENT:      Head: Normocephalic and atraumatic.      Nose: Nose normal.      Mouth/Throat:      Mouth: Mucous membranes are moist.   Eyes:      Extraocular Movements: Extraocular movements intact.      Pupils: Pupils are equal, round, and reactive to light.   Cardiovascular:      Rate and Rhythm: Normal rate and regular rhythm.      Pulses: Normal pulses.      Heart sounds: Normal heart sounds.   Pulmonary:      Effort: Pulmonary effort is normal.      Breath sounds: Normal breath sounds.   Musculoskeletal:         General: Normal range of motion.      Cervical back: Normal range of motion.   Skin:     General: Skin is warm.   Neurological:      General: No focal deficit present.      Mental Status: He is alert and oriented to person, place, and time.   Psychiatric:         Mood and Affect: Mood normal.         Behavior: Behavior " normal.        Result Review :    ecg 3/9/23  nsr  Lad  rbbb  Abnormal ecg               Assessment and Plan   Diagnoses and all orders for this visit:    1. Primary hypertension (Primary)    2. Paroxysmal atrial fibrillation (HCC)    3. Type 2 diabetes mellitus without complication, without long-term current use of insulin (HCC)    4. Mixed hyperlipidemia      Assessment & Plan   Reviewed with the patient today his echocardiogram, CT angiogram. Compared the patient's EKG results from 2019 with the patient's EKG results from 2023. The patient is on Benicar, Jardiance, Lipitor, baby aspirin, and Eliquis. He is taking propafenone for his A. fib, which is well controlled. The patient does not need any further test at this time. Advised that the patient is clear to proceed with his procedure. Advised the patient if he has any questions/concerns/ new symptoms before his next appointment to call me. The patient will follow up in 6 months.                 Follow Up   Return in about 6 months (around 9/9/2023).  Patient was given instructions and counseling regarding his condition or for health maintenance advice. Please see specific information pulled into the AVS if appropriate.     Transcribed from ambient dictation for Femi Velasquez DO by Margie Bullard.  03/09/23   11:39 CST    Patient or patient representative verbalized consent to the visit recording.  I have personally performed the services described in this document as transcribed by the above individual, and it is both accurate and complete.  Femi Velasquez DO  3/10/2023  11:04 CST

## 2023-03-10 ENCOUNTER — TELEPHONE (OUTPATIENT)
Dept: CARDIOLOGY | Facility: CLINIC | Age: 68
End: 2023-03-10
Payer: MEDICARE

## 2023-03-10 PROBLEM — I10 PRIMARY HYPERTENSION: Status: ACTIVE | Noted: 2023-03-10

## 2023-03-10 PROBLEM — E78.2 MIXED HYPERLIPIDEMIA: Status: ACTIVE | Noted: 2023-03-10

## 2023-03-10 PROBLEM — I48.0 PAROXYSMAL ATRIAL FIBRILLATION (HCC): Status: ACTIVE | Noted: 2023-03-10

## 2023-03-10 PROBLEM — E11.9 TYPE 2 DIABETES MELLITUS WITHOUT COMPLICATION, WITHOUT LONG-TERM CURRENT USE OF INSULIN (HCC): Status: ACTIVE | Noted: 2023-03-10

## 2023-03-10 NOTE — TELEPHONE ENCOUNTER
----- Message from Femi Velsaquez DO sent at 3/9/2023  9:04 AM CST -----  Please send letter to dr parnell's office. Pt is low risk to proceed with surgery.  No need for any further cardiac testing.  RBBB is chronic.

## 2023-05-10 ENCOUNTER — HOSPITAL ENCOUNTER (OUTPATIENT)
Dept: PREADMISSION TESTING | Age: 68
Discharge: HOME | End: 2023-05-14
Payer: MEDICARE

## 2023-05-10 ENCOUNTER — HOSPITAL ENCOUNTER (OUTPATIENT)
Dept: ULTRASOUND IMAGING | Facility: HOSPITAL | Age: 68
Discharge: HOME | End: 2023-05-10
Admitting: PHYSICIAN ASSISTANT
Payer: MEDICARE

## 2023-05-10 ENCOUNTER — TRANSCRIBE ORDERS (OUTPATIENT)
Dept: ADMINISTRATIVE | Facility: HOSPITAL | Age: 68
End: 2023-05-10
Payer: MEDICARE

## 2023-05-10 VITALS — BODY MASS INDEX: 35.55 KG/M2 | HEIGHT: 74 IN | WEIGHT: 277 LBS

## 2023-05-10 DIAGNOSIS — M79.605 PAIN OF LEFT LEG: ICD-10-CM

## 2023-05-10 DIAGNOSIS — M79.604 PAIN OF RIGHT LEG: ICD-10-CM

## 2023-05-10 DIAGNOSIS — M79.604 PAIN OF RIGHT LEG: Primary | ICD-10-CM

## 2023-05-10 LAB
ALBUMIN SERPL-MCNC: 4 G/DL (ref 3.5–5.2)
ALP SERPL-CCNC: 93 U/L (ref 40–130)
ALT SERPL-CCNC: 20 U/L (ref 5–41)
ANION GAP SERPL CALCULATED.3IONS-SCNC: 9 MMOL/L (ref 7–19)
APTT PPP: 28.7 SEC (ref 26–36.2)
AST SERPL-CCNC: 15 U/L (ref 5–40)
BASOPHILS # BLD: 0.1 K/UL (ref 0–0.2)
BASOPHILS NFR BLD: 0.6 % (ref 0–1)
BILIRUB SERPL-MCNC: 0.7 MG/DL (ref 0.2–1.2)
BILIRUB UR QL STRIP: NEGATIVE
BUN SERPL-MCNC: 16 MG/DL (ref 8–23)
CALCIUM SERPL-MCNC: 9.3 MG/DL (ref 8.8–10.2)
CHLORIDE SERPL-SCNC: 106 MMOL/L (ref 98–111)
CLARITY UR: CLEAR
CO2 SERPL-SCNC: 27 MMOL/L (ref 22–29)
COLOR UR: YELLOW
CREAT SERPL-MCNC: 0.9 MG/DL (ref 0.5–1.2)
EOSINOPHIL # BLD: 0.1 K/UL (ref 0–0.6)
EOSINOPHIL NFR BLD: 1.5 % (ref 0–5)
ERYTHROCYTE [DISTWIDTH] IN BLOOD BY AUTOMATED COUNT: 15.3 % (ref 11.5–14.5)
GLUCOSE SERPL-MCNC: 151 MG/DL (ref 74–109)
GLUCOSE UR STRIP.AUTO-MCNC: =>1000 MG/DL
HBA1C MFR BLD: 8 % (ref 4–6)
HCT VFR BLD AUTO: 49.1 % (ref 42–52)
HGB BLD-MCNC: 15.1 G/DL (ref 14–18)
HGB UR STRIP.AUTO-MCNC: NEGATIVE MG/L
IMM GRANULOCYTES # BLD: 0 K/UL
INR PPP: 1.14 (ref 0.88–1.18)
KETONES UR STRIP.AUTO-MCNC: NEGATIVE MG/DL
LEUKOCYTE ESTERASE UR QL STRIP.AUTO: NEGATIVE
LYMPHOCYTES # BLD: 2.5 K/UL (ref 1.1–4.5)
LYMPHOCYTES NFR BLD: 29.1 % (ref 20–40)
MCH RBC QN AUTO: 26.9 PG (ref 27–31)
MCHC RBC AUTO-ENTMCNC: 30.8 G/DL (ref 33–37)
MCV RBC AUTO: 87.5 FL (ref 80–94)
MONOCYTES # BLD: 0.5 K/UL (ref 0–0.9)
MONOCYTES NFR BLD: 5.8 % (ref 0–10)
MRSA DNA SPEC QL NAA+PROBE: NOT DETECTED
NEUTROPHILS # BLD: 5.4 K/UL (ref 1.5–7.5)
NEUTS SEG NFR BLD: 62.7 % (ref 50–65)
NITRITE UR QL STRIP.AUTO: NEGATIVE
PH UR STRIP.AUTO: 5.5 [PH] (ref 5–8)
PLATELET # BLD AUTO: 109 K/UL (ref 130–400)
PMV BLD AUTO: 12.7 FL (ref 9.4–12.4)
POTASSIUM SERPL-SCNC: 4.3 MMOL/L (ref 3.5–5)
PROT SERPL-MCNC: 6.6 G/DL (ref 6.6–8.7)
PROT UR STRIP.AUTO-MCNC: NEGATIVE MG/DL
PROTHROMBIN TIME: 14.2 SEC (ref 12–14.6)
RBC # BLD AUTO: 5.61 M/UL (ref 4.7–6.1)
SODIUM SERPL-SCNC: 142 MMOL/L (ref 136–145)
SP GR UR STRIP.AUTO: 1.03 (ref 1–1.03)
UROBILINOGEN UR STRIP.AUTO-MCNC: 1 E.U./DL
WBC # BLD AUTO: 8.6 K/UL (ref 4.8–10.8)

## 2023-05-10 PROCEDURE — 93005 ELECTROCARDIOGRAM TRACING: CPT | Performed by: ORTHOPAEDIC SURGERY

## 2023-05-10 PROCEDURE — 81003 URINALYSIS AUTO W/O SCOPE: CPT

## 2023-05-10 PROCEDURE — 93970 EXTREMITY STUDY: CPT

## 2023-05-10 PROCEDURE — 85730 THROMBOPLASTIN TIME PARTIAL: CPT

## 2023-05-10 PROCEDURE — 83036 HEMOGLOBIN GLYCOSYLATED A1C: CPT

## 2023-05-10 PROCEDURE — 85025 COMPLETE CBC W/AUTO DIFF WBC: CPT

## 2023-05-10 PROCEDURE — 80053 COMPREHEN METABOLIC PANEL: CPT

## 2023-05-10 PROCEDURE — 87641 MR-STAPH DNA AMP PROBE: CPT

## 2023-05-10 PROCEDURE — 85610 PROTHROMBIN TIME: CPT

## 2023-05-10 RX ORDER — PREGABALIN 75 MG/1
75 CAPSULE ORAL ONCE
OUTPATIENT
Start: 2023-05-31

## 2023-05-10 RX ORDER — CELECOXIB 200 MG/1
200 CAPSULE ORAL ONCE
OUTPATIENT
Start: 2023-05-31

## 2023-05-10 RX ORDER — DEXAMETHASONE SODIUM PHOSPHATE 10 MG/ML
10 INJECTION, SOLUTION INTRAMUSCULAR; INTRAVENOUS ONCE
OUTPATIENT
Start: 2023-05-31

## 2023-05-10 RX ORDER — OLMESARTAN MEDOXOMIL AND HYDROCHLOROTHIAZIDE 40/12.5 40; 12.5 MG/1; MG/1
1 TABLET ORAL DAILY
COMMUNITY

## 2023-05-10 RX ORDER — ACETAMINOPHEN 500 MG
1000 TABLET ORAL ONCE
OUTPATIENT
Start: 2023-05-31

## 2023-05-10 RX ORDER — OXYCODONE HCL 10 MG/1
10 TABLET, FILM COATED, EXTENDED RELEASE ORAL ONCE
OUTPATIENT
Start: 2023-05-31

## 2023-05-12 LAB
EKG P AXIS: 30 DEGREES
EKG P-R INTERVAL: 156 MS
EKG Q-T INTERVAL: 448 MS
EKG QRS DURATION: 154 MS
EKG QTC CALCULATION (BAZETT): 448 MS
EKG T AXIS: 0 DEGREES

## 2023-05-12 PROCEDURE — 93010 ELECTROCARDIOGRAM REPORT: CPT | Performed by: INTERNAL MEDICINE

## 2023-05-15 NOTE — PROGRESS NOTES
Cardiology read ekg/old ekg/recent echo reviewed per dr. Savage Vaughan. Ok to proceed with anesthesia for upcoming surgery with dr. Kaiser Pedersen. Tunde Omalley ma, notified. No further orders at present.

## 2023-05-15 NOTE — PROGRESS NOTES
Cardiology read ekg/old ekg reviewed per dr. Bren Suresh. Pt needs cardiac clearance prior to surgery. Anayeli holden ma, notified.

## 2023-05-24 NOTE — TELEPHONE ENCOUNTER
Letter faxed [phone]   a/p: 29 y.o.  GONZÁLEZ 2023 @ 34.6 weeks PPROM in active labor    GBS unknown, culture collected and pending  anticipate imminent   discussed with Dr Aydin Rivera, NP

## 2023-05-31 ENCOUNTER — APPOINTMENT (OUTPATIENT)
Dept: GENERAL RADIOLOGY | Age: 68
End: 2023-05-31
Attending: ORTHOPAEDIC SURGERY
Payer: MEDICARE

## 2023-05-31 ENCOUNTER — ANESTHESIA EVENT (OUTPATIENT)
Dept: OPERATING ROOM | Age: 68
End: 2023-05-31
Payer: MEDICARE

## 2023-05-31 ENCOUNTER — HOSPITAL ENCOUNTER (OUTPATIENT)
Age: 68
Setting detail: OBSERVATION
Discharge: HOME | End: 2023-06-03
Attending: ORTHOPAEDIC SURGERY | Admitting: ORTHOPAEDIC SURGERY
Payer: MEDICARE

## 2023-05-31 ENCOUNTER — ANESTHESIA (OUTPATIENT)
Dept: OPERATING ROOM | Age: 68
End: 2023-05-31
Payer: MEDICARE

## 2023-05-31 DIAGNOSIS — M17.12 PRIMARY OSTEOARTHRITIS OF LEFT KNEE: Primary | ICD-10-CM

## 2023-05-31 PROBLEM — M17.10 ARTHRITIS OF KNEE: Status: ACTIVE | Noted: 2023-05-31

## 2023-05-31 LAB
GLUCOSE BLD-MCNC: 151 MG/DL (ref 70–99)
GLUCOSE BLD-MCNC: 248 MG/DL (ref 70–99)
PERFORMED ON: ABNORMAL
PERFORMED ON: ABNORMAL

## 2023-05-31 PROCEDURE — 2720000010 HC SURG SUPPLY STERILE: Performed by: ORTHOPAEDIC SURGERY

## 2023-05-31 PROCEDURE — 6370000000 HC RX 637 (ALT 250 FOR IP): Performed by: ANESTHESIOLOGY

## 2023-05-31 PROCEDURE — C1776 JOINT DEVICE (IMPLANTABLE): HCPCS | Performed by: ORTHOPAEDIC SURGERY

## 2023-05-31 PROCEDURE — 3700000000 HC ANESTHESIA ATTENDED CARE: Performed by: ORTHOPAEDIC SURGERY

## 2023-05-31 PROCEDURE — 82962 GLUCOSE BLOOD TEST: CPT

## 2023-05-31 PROCEDURE — 2500000003 HC RX 250 WO HCPCS: Performed by: ORTHOPAEDIC SURGERY

## 2023-05-31 PROCEDURE — C9290 INJ, BUPIVACAINE LIPOSOME: HCPCS | Performed by: ORTHOPAEDIC SURGERY

## 2023-05-31 PROCEDURE — 6360000002 HC RX W HCPCS: Performed by: ORTHOPAEDIC SURGERY

## 2023-05-31 PROCEDURE — G0378 HOSPITAL OBSERVATION PER HR: HCPCS

## 2023-05-31 PROCEDURE — 3600000015 HC SURGERY LEVEL 5 ADDTL 15MIN: Performed by: ORTHOPAEDIC SURGERY

## 2023-05-31 PROCEDURE — 2580000003 HC RX 258: Performed by: ORTHOPAEDIC SURGERY

## 2023-05-31 PROCEDURE — 3600000005 HC SURGERY LEVEL 5 BASE: Performed by: ORTHOPAEDIC SURGERY

## 2023-05-31 PROCEDURE — A4217 STERILE WATER/SALINE, 500 ML: HCPCS | Performed by: ORTHOPAEDIC SURGERY

## 2023-05-31 PROCEDURE — 2709999900 HC NON-CHARGEABLE SUPPLY: Performed by: ORTHOPAEDIC SURGERY

## 2023-05-31 PROCEDURE — 6360000002 HC RX W HCPCS: Performed by: ANESTHESIOLOGY

## 2023-05-31 PROCEDURE — 2580000003 HC RX 258: Performed by: NURSE ANESTHETIST, CERTIFIED REGISTERED

## 2023-05-31 PROCEDURE — 7100000000 HC PACU RECOVERY - FIRST 15 MIN: Performed by: ORTHOPAEDIC SURGERY

## 2023-05-31 PROCEDURE — 2580000003 HC RX 258: Performed by: ANESTHESIOLOGY

## 2023-05-31 PROCEDURE — 64447 NJX AA&/STRD FEMORAL NRV IMG: CPT | Performed by: NURSE ANESTHETIST, CERTIFIED REGISTERED

## 2023-05-31 PROCEDURE — 3700000001 HC ADD 15 MINUTES (ANESTHESIA): Performed by: ORTHOPAEDIC SURGERY

## 2023-05-31 PROCEDURE — 6360000002 HC RX W HCPCS: Performed by: NURSE ANESTHETIST, CERTIFIED REGISTERED

## 2023-05-31 PROCEDURE — 6370000000 HC RX 637 (ALT 250 FOR IP): Performed by: ORTHOPAEDIC SURGERY

## 2023-05-31 PROCEDURE — 2700000000 HC OXYGEN THERAPY PER DAY

## 2023-05-31 PROCEDURE — 73560 X-RAY EXAM OF KNEE 1 OR 2: CPT

## 2023-05-31 PROCEDURE — 2500000003 HC RX 250 WO HCPCS: Performed by: NURSE ANESTHETIST, CERTIFIED REGISTERED

## 2023-05-31 PROCEDURE — 94150 VITAL CAPACITY TEST: CPT

## 2023-05-31 PROCEDURE — 7100000001 HC PACU RECOVERY - ADDTL 15 MIN: Performed by: ORTHOPAEDIC SURGERY

## 2023-05-31 DEVICE — PSN TIB POR 2 PEG SZ G L: Type: IMPLANTABLE DEVICE | Site: KNEE | Status: FUNCTIONAL

## 2023-05-31 DEVICE — PSN FEM CR POR CCR STD SZ11 L: Type: IMPLANTABLE DEVICE | Site: KNEE | Status: FUNCTIONAL

## 2023-05-31 DEVICE — PSN MC VE ASF L 10MM 8-11 GH: Type: IMPLANTABLE DEVICE | Site: KNEE | Status: FUNCTIONAL

## 2023-05-31 RX ORDER — ONDANSETRON 4 MG/1
4 TABLET, ORALLY DISINTEGRATING ORAL EVERY 8 HOURS PRN
Status: DISCONTINUED | OUTPATIENT
Start: 2023-05-31 | End: 2023-06-03 | Stop reason: HOSPADM

## 2023-05-31 RX ORDER — TRAMADOL HYDROCHLORIDE 50 MG/1
50 TABLET ORAL EVERY 6 HOURS
Status: DISCONTINUED | OUTPATIENT
Start: 2023-05-31 | End: 2023-06-03 | Stop reason: HOSPADM

## 2023-05-31 RX ORDER — OLMESARTAN MEDOXOMIL AND HYDROCHLOROTHIAZIDE 40/12.5 40; 12.5 MG/1; MG/1
1 TABLET ORAL DAILY
Status: DISCONTINUED | OUTPATIENT
Start: 2023-05-31 | End: 2023-05-31

## 2023-05-31 RX ORDER — LOSARTAN POTASSIUM 100 MG/1
100 TABLET ORAL DAILY
Status: DISCONTINUED | OUTPATIENT
Start: 2023-05-31 | End: 2023-06-03 | Stop reason: HOSPADM

## 2023-05-31 RX ORDER — SCOLOPAMINE TRANSDERMAL SYSTEM 1 MG/1
1 PATCH, EXTENDED RELEASE TRANSDERMAL
Status: DISCONTINUED | OUTPATIENT
Start: 2023-05-31 | End: 2023-05-31 | Stop reason: HOSPADM

## 2023-05-31 RX ORDER — CLINDAMYCIN PHOSPHATE 900 MG/50ML
900 INJECTION INTRAVENOUS EVERY 8 HOURS
Status: COMPLETED | OUTPATIENT
Start: 2023-05-31 | End: 2023-06-02

## 2023-05-31 RX ORDER — DEXAMETHASONE SODIUM PHOSPHATE 10 MG/ML
INJECTION, SOLUTION INTRAMUSCULAR; INTRAVENOUS PRN
Status: DISCONTINUED | OUTPATIENT
Start: 2023-05-31 | End: 2023-05-31 | Stop reason: SDUPTHER

## 2023-05-31 RX ORDER — LIDOCAINE HYDROCHLORIDE 10 MG/ML
INJECTION, SOLUTION EPIDURAL; INFILTRATION; INTRACAUDAL; PERINEURAL PRN
Status: DISCONTINUED | OUTPATIENT
Start: 2023-05-31 | End: 2023-05-31 | Stop reason: SDUPTHER

## 2023-05-31 RX ORDER — ACETAMINOPHEN 325 MG/1
650 TABLET ORAL EVERY 6 HOURS
Status: DISCONTINUED | OUTPATIENT
Start: 2023-05-31 | End: 2023-06-03 | Stop reason: HOSPADM

## 2023-05-31 RX ORDER — PROPAFENONE HYDROCHLORIDE 150 MG/1
300 TABLET, COATED ORAL EVERY 8 HOURS
Status: DISCONTINUED | OUTPATIENT
Start: 2023-05-31 | End: 2023-06-03 | Stop reason: HOSPADM

## 2023-05-31 RX ORDER — METOCLOPRAMIDE HYDROCHLORIDE 5 MG/ML
10 INJECTION INTRAMUSCULAR; INTRAVENOUS
Status: DISCONTINUED | OUTPATIENT
Start: 2023-05-31 | End: 2023-05-31 | Stop reason: HOSPADM

## 2023-05-31 RX ORDER — ACETAMINOPHEN 500 MG
1000 TABLET ORAL ONCE
Status: COMPLETED | OUTPATIENT
Start: 2023-05-31 | End: 2023-05-31

## 2023-05-31 RX ORDER — SODIUM CHLORIDE 9 MG/ML
INJECTION, SOLUTION INTRAVENOUS PRN
Status: DISCONTINUED | OUTPATIENT
Start: 2023-05-31 | End: 2023-05-31 | Stop reason: HOSPADM

## 2023-05-31 RX ORDER — ASPIRIN 81 MG/1
81 TABLET ORAL DAILY
Status: DISCONTINUED | OUTPATIENT
Start: 2023-05-31 | End: 2023-06-03 | Stop reason: HOSPADM

## 2023-05-31 RX ORDER — HYDROCHLOROTHIAZIDE 12.5 MG/1
12.5 CAPSULE, GELATIN COATED ORAL DAILY
Status: DISCONTINUED | OUTPATIENT
Start: 2023-05-31 | End: 2023-06-03 | Stop reason: HOSPADM

## 2023-05-31 RX ORDER — SODIUM CHLORIDE 0.9 % (FLUSH) 0.9 %
5-40 SYRINGE (ML) INJECTION EVERY 12 HOURS SCHEDULED
Status: DISCONTINUED | OUTPATIENT
Start: 2023-05-31 | End: 2023-05-31 | Stop reason: HOSPADM

## 2023-05-31 RX ORDER — IPRATROPIUM BROMIDE AND ALBUTEROL SULFATE 2.5; .5 MG/3ML; MG/3ML
1 SOLUTION RESPIRATORY (INHALATION)
Status: DISCONTINUED | OUTPATIENT
Start: 2023-05-31 | End: 2023-05-31 | Stop reason: HOSPADM

## 2023-05-31 RX ORDER — DIPHENHYDRAMINE HCL 25 MG
25 TABLET ORAL EVERY 6 HOURS PRN
Status: DISCONTINUED | OUTPATIENT
Start: 2023-05-31 | End: 2023-06-03 | Stop reason: HOSPADM

## 2023-05-31 RX ORDER — DEXAMETHASONE SODIUM PHOSPHATE 10 MG/ML
10 INJECTION, SOLUTION INTRAMUSCULAR; INTRAVENOUS ONCE
Status: DISCONTINUED | OUTPATIENT
Start: 2023-05-31 | End: 2023-05-31 | Stop reason: HOSPADM

## 2023-05-31 RX ORDER — ONDANSETRON 2 MG/ML
INJECTION INTRAMUSCULAR; INTRAVENOUS PRN
Status: DISCONTINUED | OUTPATIENT
Start: 2023-05-31 | End: 2023-05-31 | Stop reason: SDUPTHER

## 2023-05-31 RX ORDER — CELECOXIB 200 MG/1
200 CAPSULE ORAL ONCE
Status: COMPLETED | OUTPATIENT
Start: 2023-05-31 | End: 2023-05-31

## 2023-05-31 RX ORDER — FENTANYL CITRATE 50 UG/ML
INJECTION, SOLUTION INTRAMUSCULAR; INTRAVENOUS PRN
Status: DISCONTINUED | OUTPATIENT
Start: 2023-05-31 | End: 2023-05-31 | Stop reason: SDUPTHER

## 2023-05-31 RX ORDER — LABETALOL HYDROCHLORIDE 5 MG/ML
10 INJECTION, SOLUTION INTRAVENOUS
Status: DISCONTINUED | OUTPATIENT
Start: 2023-05-31 | End: 2023-05-31 | Stop reason: HOSPADM

## 2023-05-31 RX ORDER — SODIUM CHLORIDE 9 MG/ML
INJECTION, SOLUTION INTRAVENOUS CONTINUOUS
Status: DISCONTINUED | OUTPATIENT
Start: 2023-05-31 | End: 2023-06-03 | Stop reason: HOSPADM

## 2023-05-31 RX ORDER — LIDOCAINE HYDROCHLORIDE 10 MG/ML
1 INJECTION, SOLUTION EPIDURAL; INFILTRATION; INTRACAUDAL; PERINEURAL
Status: DISCONTINUED | OUTPATIENT
Start: 2023-05-31 | End: 2023-05-31 | Stop reason: HOSPADM

## 2023-05-31 RX ORDER — ROCURONIUM BROMIDE 10 MG/ML
INJECTION, SOLUTION INTRAVENOUS PRN
Status: DISCONTINUED | OUTPATIENT
Start: 2023-05-31 | End: 2023-05-31 | Stop reason: SDUPTHER

## 2023-05-31 RX ORDER — BISACODYL 5 MG/1
5 TABLET, DELAYED RELEASE ORAL DAILY
Status: DISCONTINUED | OUTPATIENT
Start: 2023-05-31 | End: 2023-06-03 | Stop reason: HOSPADM

## 2023-05-31 RX ORDER — OXYCODONE HYDROCHLORIDE 5 MG/1
15 TABLET ORAL EVERY 4 HOURS PRN
Status: DISCONTINUED | OUTPATIENT
Start: 2023-05-31 | End: 2023-06-03 | Stop reason: HOSPADM

## 2023-05-31 RX ORDER — ONDANSETRON 2 MG/ML
4 INJECTION INTRAMUSCULAR; INTRAVENOUS EVERY 6 HOURS PRN
Status: DISCONTINUED | OUTPATIENT
Start: 2023-05-31 | End: 2023-06-03 | Stop reason: HOSPADM

## 2023-05-31 RX ORDER — MORPHINE SULFATE 2 MG/ML
2 INJECTION, SOLUTION INTRAMUSCULAR; INTRAVENOUS EVERY 5 MIN PRN
Status: DISCONTINUED | OUTPATIENT
Start: 2023-05-31 | End: 2023-05-31 | Stop reason: HOSPADM

## 2023-05-31 RX ORDER — HYDROMORPHONE HYDROCHLORIDE 1 MG/ML
0.25 INJECTION, SOLUTION INTRAMUSCULAR; INTRAVENOUS; SUBCUTANEOUS
Status: DISCONTINUED | OUTPATIENT
Start: 2023-05-31 | End: 2023-06-03 | Stop reason: HOSPADM

## 2023-05-31 RX ORDER — DIPHENHYDRAMINE HYDROCHLORIDE 50 MG/ML
12.5 INJECTION INTRAMUSCULAR; INTRAVENOUS
Status: DISCONTINUED | OUTPATIENT
Start: 2023-05-31 | End: 2023-05-31 | Stop reason: HOSPADM

## 2023-05-31 RX ORDER — HYDROMORPHONE HYDROCHLORIDE 1 MG/ML
1 INJECTION, SOLUTION INTRAMUSCULAR; INTRAVENOUS; SUBCUTANEOUS
Status: DISCONTINUED | OUTPATIENT
Start: 2023-05-31 | End: 2023-06-03 | Stop reason: HOSPADM

## 2023-05-31 RX ORDER — FAMOTIDINE 20 MG/1
20 TABLET, FILM COATED ORAL ONCE
Status: COMPLETED | OUTPATIENT
Start: 2023-05-31 | End: 2023-05-31

## 2023-05-31 RX ORDER — SODIUM CHLORIDE 0.9 % (FLUSH) 0.9 %
5-40 SYRINGE (ML) INJECTION EVERY 12 HOURS SCHEDULED
Status: DISCONTINUED | OUTPATIENT
Start: 2023-05-31 | End: 2023-06-03 | Stop reason: HOSPADM

## 2023-05-31 RX ORDER — 0.9 % SODIUM CHLORIDE 0.9 %
500 INTRAVENOUS SOLUTION INTRAVENOUS PRN
Status: DISCONTINUED | OUTPATIENT
Start: 2023-05-31 | End: 2023-06-03 | Stop reason: HOSPADM

## 2023-05-31 RX ORDER — LORAZEPAM 2 MG/ML
0.5 INJECTION INTRAMUSCULAR
Status: DISCONTINUED | OUTPATIENT
Start: 2023-05-31 | End: 2023-05-31 | Stop reason: HOSPADM

## 2023-05-31 RX ORDER — SODIUM CHLORIDE, SODIUM LACTATE, POTASSIUM CHLORIDE, CALCIUM CHLORIDE 600; 310; 30; 20 MG/100ML; MG/100ML; MG/100ML; MG/100ML
INJECTION, SOLUTION INTRAVENOUS CONTINUOUS PRN
Status: DISCONTINUED | OUTPATIENT
Start: 2023-05-31 | End: 2023-05-31 | Stop reason: SDUPTHER

## 2023-05-31 RX ORDER — OXYCODONE HYDROCHLORIDE 5 MG/1
10 TABLET ORAL EVERY 4 HOURS PRN
Status: DISCONTINUED | OUTPATIENT
Start: 2023-05-31 | End: 2023-06-03 | Stop reason: HOSPADM

## 2023-05-31 RX ORDER — PREGABALIN 75 MG/1
75 CAPSULE ORAL ONCE
Status: COMPLETED | OUTPATIENT
Start: 2023-05-31 | End: 2023-05-31

## 2023-05-31 RX ORDER — HYDROMORPHONE HYDROCHLORIDE 1 MG/ML
0.5 INJECTION, SOLUTION INTRAMUSCULAR; INTRAVENOUS; SUBCUTANEOUS
Status: DISCONTINUED | OUTPATIENT
Start: 2023-05-31 | End: 2023-06-03 | Stop reason: HOSPADM

## 2023-05-31 RX ORDER — OXYCODONE HCL 10 MG/1
10 TABLET, FILM COATED, EXTENDED RELEASE ORAL ONCE
Status: COMPLETED | OUTPATIENT
Start: 2023-05-31 | End: 2023-05-31

## 2023-05-31 RX ORDER — SODIUM CHLORIDE 9 MG/ML
INJECTION, SOLUTION INTRAVENOUS PRN
Status: DISCONTINUED | OUTPATIENT
Start: 2023-05-31 | End: 2023-06-03 | Stop reason: HOSPADM

## 2023-05-31 RX ORDER — CEFAZOLIN SODIUM 1 G/3ML
INJECTION, POWDER, FOR SOLUTION INTRAMUSCULAR; INTRAVENOUS PRN
Status: DISCONTINUED | OUTPATIENT
Start: 2023-05-31 | End: 2023-05-31 | Stop reason: SDUPTHER

## 2023-05-31 RX ORDER — ROPIVACAINE HYDROCHLORIDE 5 MG/ML
30 INJECTION, SOLUTION EPIDURAL; INFILTRATION; PERINEURAL ONCE
Status: COMPLETED | OUTPATIENT
Start: 2023-05-31 | End: 2023-05-31

## 2023-05-31 RX ORDER — MORPHINE SULFATE 4 MG/ML
4 INJECTION, SOLUTION INTRAMUSCULAR; INTRAVENOUS EVERY 5 MIN PRN
Status: DISCONTINUED | OUTPATIENT
Start: 2023-05-31 | End: 2023-05-31 | Stop reason: HOSPADM

## 2023-05-31 RX ORDER — SODIUM CHLORIDE 0.9 % (FLUSH) 0.9 %
5-40 SYRINGE (ML) INJECTION PRN
Status: DISCONTINUED | OUTPATIENT
Start: 2023-05-31 | End: 2023-05-31 | Stop reason: HOSPADM

## 2023-05-31 RX ORDER — MEPERIDINE HYDROCHLORIDE 25 MG/ML
12.5 INJECTION INTRAMUSCULAR; INTRAVENOUS; SUBCUTANEOUS EVERY 5 MIN PRN
Status: DISCONTINUED | OUTPATIENT
Start: 2023-05-31 | End: 2023-05-31 | Stop reason: HOSPADM

## 2023-05-31 RX ORDER — SODIUM CHLORIDE, SODIUM LACTATE, POTASSIUM CHLORIDE, CALCIUM CHLORIDE 600; 310; 30; 20 MG/100ML; MG/100ML; MG/100ML; MG/100ML
INJECTION, SOLUTION INTRAVENOUS CONTINUOUS
Status: DISCONTINUED | OUTPATIENT
Start: 2023-05-31 | End: 2023-05-31 | Stop reason: HOSPADM

## 2023-05-31 RX ORDER — DROPERIDOL 2.5 MG/ML
0.62 INJECTION, SOLUTION INTRAMUSCULAR; INTRAVENOUS
Status: DISCONTINUED | OUTPATIENT
Start: 2023-05-31 | End: 2023-05-31 | Stop reason: HOSPADM

## 2023-05-31 RX ORDER — OXYCODONE HYDROCHLORIDE 5 MG/1
5 TABLET ORAL EVERY 4 HOURS PRN
Status: DISCONTINUED | OUTPATIENT
Start: 2023-05-31 | End: 2023-06-03 | Stop reason: HOSPADM

## 2023-05-31 RX ORDER — MIDAZOLAM HYDROCHLORIDE 2 MG/2ML
2 INJECTION, SOLUTION INTRAMUSCULAR; INTRAVENOUS
Status: COMPLETED | OUTPATIENT
Start: 2023-05-31 | End: 2023-05-31

## 2023-05-31 RX ORDER — PROPOFOL 10 MG/ML
INJECTION, EMULSION INTRAVENOUS PRN
Status: DISCONTINUED | OUTPATIENT
Start: 2023-05-31 | End: 2023-05-31 | Stop reason: SDUPTHER

## 2023-05-31 RX ORDER — ONDANSETRON 4 MG/1
4 TABLET, ORALLY DISINTEGRATING ORAL EVERY 8 HOURS PRN
Status: DISCONTINUED | OUTPATIENT
Start: 2023-05-31 | End: 2023-05-31

## 2023-05-31 RX ORDER — SODIUM CHLORIDE 0.9 % (FLUSH) 0.9 %
5-40 SYRINGE (ML) INJECTION PRN
Status: DISCONTINUED | OUTPATIENT
Start: 2023-05-31 | End: 2023-06-03 | Stop reason: HOSPADM

## 2023-05-31 RX ORDER — ATORVASTATIN CALCIUM 40 MG/1
40 TABLET, FILM COATED ORAL DAILY
Status: DISCONTINUED | OUTPATIENT
Start: 2023-05-31 | End: 2023-06-03 | Stop reason: HOSPADM

## 2023-05-31 RX ADMIN — TRAMADOL HYDROCHLORIDE 50 MG: 50 TABLET, COATED ORAL at 21:21

## 2023-05-31 RX ADMIN — ASPIRIN 81 MG: 81 TABLET, COATED ORAL at 17:02

## 2023-05-31 RX ADMIN — ACETAMINOPHEN 650 MG: 325 TABLET ORAL at 21:21

## 2023-05-31 RX ADMIN — MORPHINE SULFATE 2 MG: 2 INJECTION, SOLUTION INTRAMUSCULAR; INTRAVENOUS at 13:04

## 2023-05-31 RX ADMIN — METFORMIN HYDROCHLORIDE 1000 MG: 500 TABLET ORAL at 17:01

## 2023-05-31 RX ADMIN — ROCURONIUM BROMIDE 20 MG: 10 INJECTION, SOLUTION INTRAVENOUS at 11:06

## 2023-05-31 RX ADMIN — ROPIVACAINE HYDROCHLORIDE 30 ML: 5 INJECTION, SOLUTION EPIDURAL; INFILTRATION; PERINEURAL at 09:33

## 2023-05-31 RX ADMIN — SODIUM CHLORIDE, PRESERVATIVE FREE 10 ML: 5 INJECTION INTRAVENOUS at 21:21

## 2023-05-31 RX ADMIN — CLINDAMYCIN IN 5 PERCENT DEXTROSE 900 MG: 18 INJECTION, SOLUTION INTRAVENOUS at 23:36

## 2023-05-31 RX ADMIN — ONDANSETRON 4 MG: 2 INJECTION INTRAMUSCULAR; INTRAVENOUS at 11:06

## 2023-05-31 RX ADMIN — DEXAMETHASONE SODIUM PHOSPHATE 10 MG: 10 INJECTION, SOLUTION INTRAMUSCULAR; INTRAVENOUS at 10:16

## 2023-05-31 RX ADMIN — FAMOTIDINE 20 MG: 20 TABLET ORAL at 08:28

## 2023-05-31 RX ADMIN — CELECOXIB 200 MG: 200 CAPSULE ORAL at 08:09

## 2023-05-31 RX ADMIN — SODIUM CHLORIDE: 9 INJECTION, SOLUTION INTRAVENOUS at 17:02

## 2023-05-31 RX ADMIN — CEFAZOLIN 3000 MG: 10 INJECTION, POWDER, FOR SOLUTION INTRAVENOUS at 21:26

## 2023-05-31 RX ADMIN — FENTANYL CITRATE 50 MCG: 0.05 INJECTION, SOLUTION INTRAMUSCULAR; INTRAVENOUS at 10:05

## 2023-05-31 RX ADMIN — APIXABAN 5 MG: 5 TABLET, FILM COATED ORAL at 09:02

## 2023-05-31 RX ADMIN — SODIUM CHLORIDE, POTASSIUM CHLORIDE, SODIUM LACTATE AND CALCIUM CHLORIDE: 600; 310; 30; 20 INJECTION, SOLUTION INTRAVENOUS at 07:53

## 2023-05-31 RX ADMIN — MORPHINE SULFATE 4 MG: 4 INJECTION, SOLUTION INTRAMUSCULAR; INTRAVENOUS at 12:45

## 2023-05-31 RX ADMIN — TRAMADOL HYDROCHLORIDE 50 MG: 50 TABLET, COATED ORAL at 15:06

## 2023-05-31 RX ADMIN — PROPOFOL 150 MG: 10 INJECTION, EMULSION INTRAVENOUS at 10:06

## 2023-05-31 RX ADMIN — MORPHINE SULFATE 4 MG: 4 INJECTION, SOLUTION INTRAMUSCULAR; INTRAVENOUS at 12:32

## 2023-05-31 RX ADMIN — OXYCODONE HYDROCHLORIDE 10 MG: 10 TABLET, FILM COATED, EXTENDED RELEASE ORAL at 08:09

## 2023-05-31 RX ADMIN — PREGABALIN 75 MG: 75 CAPSULE ORAL at 08:09

## 2023-05-31 RX ADMIN — FENTANYL CITRATE 50 MCG: 0.05 INJECTION, SOLUTION INTRAMUSCULAR; INTRAVENOUS at 11:13

## 2023-05-31 RX ADMIN — FENTANYL CITRATE 50 MCG: 0.05 INJECTION, SOLUTION INTRAMUSCULAR; INTRAVENOUS at 10:30

## 2023-05-31 RX ADMIN — LIDOCAINE HYDROCHLORIDE 50 MG: 10 INJECTION, SOLUTION EPIDURAL; INFILTRATION; INTRACAUDAL; PERINEURAL at 10:06

## 2023-05-31 RX ADMIN — OXYCODONE 10 MG: 5 TABLET ORAL at 14:16

## 2023-05-31 RX ADMIN — ACETAMINOPHEN 1000 MG: 500 TABLET, FILM COATED ORAL at 08:09

## 2023-05-31 RX ADMIN — BISACODYL 5 MG: 5 TABLET, COATED ORAL at 14:16

## 2023-05-31 RX ADMIN — CEFAZOLIN SODIUM 2 G: 1 INJECTION, POWDER, FOR SOLUTION INTRAMUSCULAR; INTRAVENOUS at 10:18

## 2023-05-31 RX ADMIN — SODIUM CHLORIDE, SODIUM LACTATE, POTASSIUM CHLORIDE, AND CALCIUM CHLORIDE: 600; 310; 30; 20 INJECTION, SOLUTION INTRAVENOUS at 09:54

## 2023-05-31 RX ADMIN — MIDAZOLAM 2 MG: 1 INJECTION INTRAMUSCULAR; INTRAVENOUS at 09:32

## 2023-05-31 RX ADMIN — ACETAMINOPHEN 650 MG: 325 TABLET ORAL at 14:17

## 2023-05-31 RX ADMIN — PROPAFENONE HYDROCHLORIDE 300 MG: 150 TABLET, FILM COATED ORAL at 23:36

## 2023-05-31 RX ADMIN — OXYCODONE 10 MG: 5 TABLET ORAL at 21:28

## 2023-05-31 RX ADMIN — CLINDAMYCIN IN 5 PERCENT DEXTROSE 900 MG: 18 INJECTION, SOLUTION INTRAVENOUS at 15:09

## 2023-05-31 RX ADMIN — SUGAMMADEX 250 MG: 100 INJECTION, SOLUTION INTRAVENOUS at 12:06

## 2023-05-31 RX ADMIN — ROCURONIUM BROMIDE 80 MG: 10 INJECTION, SOLUTION INTRAVENOUS at 10:06

## 2023-05-31 RX ADMIN — SODIUM CHLORIDE: 9 INJECTION, SOLUTION INTRAVENOUS at 21:24

## 2023-05-31 ASSESSMENT — PAIN DESCRIPTION - LOCATION
LOCATION: KNEE

## 2023-05-31 ASSESSMENT — PAIN SCALES - GENERAL
PAINLEVEL_OUTOF10: 6
PAINLEVEL_OUTOF10: 8
PAINLEVEL_OUTOF10: 8
PAINLEVEL_OUTOF10: 6
PAINLEVEL_OUTOF10: 8
PAINLEVEL_OUTOF10: 8

## 2023-05-31 ASSESSMENT — PAIN - FUNCTIONAL ASSESSMENT
PAIN_FUNCTIONAL_ASSESSMENT: 0-10
PAIN_FUNCTIONAL_ASSESSMENT: PREVENTS OR INTERFERES SOME ACTIVE ACTIVITIES AND ADLS

## 2023-05-31 ASSESSMENT — PAIN DESCRIPTION - DESCRIPTORS
DESCRIPTORS: ACHING

## 2023-05-31 ASSESSMENT — PAIN DESCRIPTION - ORIENTATION
ORIENTATION: LEFT

## 2023-05-31 ASSESSMENT — LIFESTYLE VARIABLES: SMOKING_STATUS: 0

## 2023-05-31 ASSESSMENT — ENCOUNTER SYMPTOMS: SHORTNESS OF BREATH: 0

## 2023-05-31 NOTE — ANESTHESIA PROCEDURE NOTES
Peripheral Block    Patient location during procedure: holding area  Reason for block: post-op pain management  Start time: 5/31/2023 1:16 PM  End time: 5/31/2023 1:16 PM  Staffing  Performed: anesthesiologist   Anesthesiologist: Johnny Gary MD  Preanesthetic Checklist  Completed: patient identified, IV checked, site marked, risks and benefits discussed, surgical/procedural consents, equipment checked, pre-op evaluation, timeout performed, anesthesia consent given, oxygen available and monitors applied/VS acknowledged  Peripheral Block   Patient position: supine  Prep: ChloraPrep  Provider prep: mask and sterile gloves  Patient monitoring: cardiac monitor, continuous pulse ox, frequent blood pressure checks and IV access  Block type: Femoral  Adductor canal  Laterality: left  Injection technique: single-shot  Guidance: ultrasound guided    Needle   Needle type: short-bevel   Needle gauge: 20 G  Needle localization: ultrasound guidance  Needle length: 10 cm  Assessment   Injection assessment: negative aspiration for heme, no paresthesia on injection and local visualized surrounding nerve on ultrasound  Paresthesia pain: none  Slow fractionated injection: yes  Hemodynamics: stable    Additional Notes  Needle was introduced in proximal third of thigh in an  bogdan-medial to posterior direction. The needle was visualized \" in- plane\" under ultrasound guidance to access the adductor canal in a sub-sartorial fashion. The femoral artery was avoided and 20 cc of 0.5% ropivacaine  was injected and surrounded the nerve plexus. The plexus appeared anatomically normal, no obvious pathology was noted.  A permanent image was obtained

## 2023-05-31 NOTE — H&P
[facility] ([facility]) Pre-Operative History and Physical    Patient Name: Andreas  : 1955    /60   Pulse 71   Temp 97.7 °F (36.5 °C) (Tympanic)   Resp 16   Ht 6' 2\" (1.88 m)   Wt 277 lb (125.6 kg)   SpO2 97%   BMI 35.56 kg/m²     Pre-Operative Diagnosis:  Knee arthritits    Proposed Surgical Procedure:   Knee replacement      Past Medical Hisotry:       Diagnosis Date    Arthritis     Diabetes mellitus ([address]     Hx of blood clots     hx of pe    Hyperlipidemia     Cholesterol management per his pcp. Hypertension     Knee pain     Paroxysmal atrial fibrillation [facility])     sees dr. jensen/     Pilonidal cyst     Sleep apnea     prn cpap     Past Surgical History:       Procedure Laterality Date    APPENDECTOMY      BACK SURGERY      CHOLECYSTECTOMY, LAPAROSCOPIC  10/16/03    w/lysis of adhesions and hernia repair-Dr eWro Rodrigues    COLONOSCOPY  10/5/10    Dr [place] Shoulder Wan-internal hemorrhoids, bx not available    COLONOSCOPY N/A 7/15/2016    Dr Dilia Esquivel, 10 yr recall    KNEE SURGERY Bilateral     PILONIDAL CYST EXCISION      UMBILICAL HERNIA REPAIR  10/16/03    w/qamar-Dr Wero Rodrigues    UPPER GASTROINTESTINAL ENDOSCOPY N/A 7/15/2016    Dr JR Ley-(+) H Pylori, moderate chronic gastritis       Medications:   Prior to Admission medications    Medication Sig Start Date End Date Taking?  Authorizing Provider   olmesartan-hydroCHLOROthiazide (BENICAR HCT) 40-12.5 MG per tablet Take 1 tablet by mouth daily Indications: High Blood Pressure Disorder    Historical Provider, MD   apixaban (ELIQUIS) 5 MG TABS tablet Take 2 tablets by mouth 2 times daily for 11 doses  Patient not taking: Reported on 5/10/2023 11/12/22 5/10/23  Mona Rodriguez MD   apixaban (ELIQUIS) 5 MG TABS tablet Take 1 tablet by mouth 2 times daily 22   Mona Rodriguez MD   empagliflozin (JARDIANCE) 25 MG tablet Take 1 tablet by mouth daily    Historical Provider, MD   ondansetron (ZOFRAN ODT) 4 MG disintegrating tablet Take 1 tablet by

## 2023-05-31 NOTE — ANESTHESIA PROCEDURE NOTES
Airway  Date/Time: 5/31/2023 10:09 AM  Urgency: elective    Airway not difficult    General Information and Staff    Patient location during procedure: OR  Resident/CRNA: JAZZMINE Kwok - CRNA    Indications and Patient Condition  Indications for airway management: anesthesia  Spontaneous ventilation: present  Sedation level: deep  Preoxygenated: yes  Patient position: ramp  Mask difficulty assessment: vent by bag mask + OA or adjuvant +/- NMBA    Final Airway Details  Final airway type: endotracheal airway      Successful airway: ETT  Cuffed: yes   Successful intubation technique: direct laryngoscopy  Endotracheal tube insertion site: oral  Blade: Mahsa  Blade size: #4  ETT size (mm): 7.0  Cormack-Lehane Classification: grade IIa - partial view of glottis  Placement verified by: chest auscultation and capnometry   Inital cuff pressure (cm H2O): 7  Measured from: teeth  ETT to teeth (cm): 23  Number of attempts at approach: 1    Additional Comments  DL X 2 with MAC 4, grade 2a view, 7.0 ETT placed atraumatically through glottic opening, secured 23 cm bernardo at teeth. + BBS, + ETCO2, equal rise and fall of chest and maintained SPO2 thoughout. Dentition unchanged.    no

## 2023-05-31 NOTE — ANESTHESIA PRE PROCEDURE
Department of Anesthesiology  Preprocedure Note       Name:  Cherelle Yanes   Age:  79 y.o.  :  1955                                          MRN:  [id number]         Date:  2023      Surgeon: Lali Whitten):  Viviane Duarte MD    Procedure: Procedure(s):  ROBOTIC LEFT KNEE TOTAL ARTHROPLASTY    Medications prior to admission:   Prior to Admission medications    Medication Sig Start Date End Date Taking? Authorizing Provider   olmesartan-hydroCHLOROthiazide (BENICAR HCT) 40-12.5 MG per tablet Take 1 tablet by mouth daily Indications: High Blood Pressure Disorder    Historical Provider, MD   apixaban (ELIQUIS) 5 MG TABS tablet Take 2 tablets by mouth 2 times daily for 11 doses  Patient not taking: Reported on 5/10/2023 11/12/22 5/10/23  Viraj Luevano MD   apixaban (ELIQUIS) 5 MG TABS tablet Take 1 tablet by mouth 2 times daily 22   Viraj Luevano MD   empagliflozin (JARDIANCE) 25 MG tablet Take 1 tablet by mouth daily    Historical Provider, MD   ondansetron (ZOFRAN ODT) 4 MG disintegrating tablet Take 1 tablet by mouth every 8 hours as needed for Nausea or Vomiting 19   Moses Cornejo MD   propafenone (RYTHMOL) 300 MG tablet Take 1 tablet by mouth in the morning and 1 tablet at noon and 1 tablet in the evening.     Historical Provider, MD   atorvastatin (LIPITOR) 40 MG tablet Take 1 tablet by mouth daily 11/17/15   Historical Provider, MD   aspirin 81 MG EC tablet Take 1 tablet by mouth daily    Historical Provider, MD   metformin (GLUCOPHAGE) 500 MG tablet Take 2 tablets by mouth 2 times daily (with meals)    Historical Provider, MD       Current medications:    Current Facility-Administered Medications   Medication Dose Route Frequency Provider Last Rate Last Admin    ceFAZolin (ANCEF) 2,000 mg in sterile water 20 mL IV syringe  2,000 mg IntraVENous Once Viviane Duarte MD        dexamethasone (PF) (DECADRON) injection 10 mg  10 mg IntraVENous Once Viviane Duarte MD        lactated

## 2023-05-31 NOTE — BRIEF OP NOTE
Brief Postoperative Note      Patient: Freda Polo  YOB: 1955  MRN: [id number]    Date of Procedure: 5/31/2023    Pre-Op Diagnosis Codes:     * Primary osteoarthritis of left knee [M17.12]    Post-Op Diagnosis: Same       Procedure(s):  ROBOTIC LEFT KNEE TOTAL ARTHROPLASTY COMPLEX    Surgeon(s):  Aleyda Christiansen MD    Assistant:  First Assistant: Pedro Dia RN; Era Gutierrez    Anesthesia: Choice    Estimated Blood Loss (mL): 179    Complications: None    Specimens:   * No specimens in log *    Implants:  Implant Name Type Inv.  Item Serial No.  Lot No. LRB No. Used Action   PSN TIB POR 2 PEG SZ G L - [id number]  PSN TIB POR 2 PEG SZ G L  [facility] [id number] Left 1 Implanted   PSN FEM CR POR CCR STD SZ11 L - [id number]  PSN FEM CR POR CCR STD SZ11 L  [facility] [id number] Left 1 Implanted   PSN MC VE ASF L 10MM 8-11  - [id number]  PSN MC VE ASF L 10MM 8-11   [facility] [id number] Left 1 Implanted         Drains: * No LDAs found *    Findings:       Electronically signed by Aleyda Christiansen MD on 5/31/2023 at 12:04 PM

## 2023-05-31 NOTE — DISCHARGE INSTR - DIET
Good nutrition is important when healing from an illness, injury, or surgery. Follow any nutrition recommendations given to you during your hospital stay. If you were given an oral nutrition supplement while in the hospital, continue to take this supplement at home. You can take it with meals, in-between meals, and/or before bedtime. These supplements can be purchased at most local grocery stores, pharmacies, and chain [employer]-stores. If you have any questions about your diet or nutrition, call the hospital and ask for the dietitian.              Low carb / High protein

## 2023-05-31 NOTE — DISCHARGE INSTRUCTIONS
Dr Nile Bro Total Knee Replacement Discharge Instructions     *Elevate legs at all times when not walking     * Use Ice Pack to affected extremity as needed for swelling and pain     * HOMEWORK          Be able to fully straighten leg by post-op appointment. This is the most important thing to work on!!!          Use the ankle pillow or a towel roll under the ankle to work on straightening          You may work on bending the knee also    *Surgical Site Care: The adhesive (glue strip) dressing can be removed in 3 weeks. May shower on Friday and clean wound but do not scrub incision. Pat dry. NO tub bathing or swimming. Wash hands frequently during the day. *Activity:      SAFETY FIRST ! [address] !!!                 Home Health therapy will come to the house three times a week:                    Get in and out of your bed                                                                                           Getting up and down out of the chair                                                                   Bathroom  / bathing activities                                                                                Do NOT walk for exercise ( it will increase pain and swelling )                            Walk several times a day but only for short distances             *Pain Medicines:          Keep a LOG of your medicines to track when pain med is due          Take prescribed medicine as needed for pain          Take Tylenol as your pain decreases          Take a stool softener of your choice while taking pain medications as they are very constipating ( examples:  colace  dulcolax  fiber   prune juice )      *To prevent blood clots: Take prescribed blood thinner as directed.   (Resume Eliquis)           While taking the blood thinner, DO NOT take any other NSAIDS like Naprosyn, Ibuprofen, etc.          Do ankle pump exercises when sitting in the chair

## 2023-05-31 NOTE — ANESTHESIA POSTPROCEDURE EVALUATION
Department of Anesthesiology  Postprocedure Note    Patient: Shelby Cavazos  MRN: [id number]  YOB: 1955  Date of evaluation: 5/31/2023      Procedure Summary     Date: 05/31/23 Room / Location: [address]    Anesthesia Start: 8385 Anesthesia Stop:     Procedure: ROBOTIC LEFT KNEE TOTAL ARTHROPLASTY COMPLEX (Left: Knee) Diagnosis:       Primary osteoarthritis of left knee      (Primary osteoarthritis of left knee [M17.12])    Surgeons: Ricardo Maldonado MD Responsible Provider: JAZZMINE Zepeda CRNA    Anesthesia Type: general, regional ASA Status: 3          Anesthesia Type: No value filed.     Cary Phase I: Cary Score: 10    Cary Phase II:        Anesthesia Post Evaluation    Patient location during evaluation: PACU  Patient participation: complete - patient participated  Level of consciousness: responsive to verbal stimuli  Airway patency: patent  Nausea & Vomiting: no nausea and no vomiting  Complications: no  Cardiovascular status: blood pressure returned to baseline and hemodynamically stable  Respiratory status: acceptable, face mask and nonlabored ventilation  Hydration status: stable  Multimodal analgesia pain management approach

## 2023-06-01 PROBLEM — M17.12 PRIMARY OSTEOARTHRITIS OF LEFT KNEE: Status: ACTIVE | Noted: 2023-06-01

## 2023-06-01 LAB
ANION GAP SERPL CALCULATED.3IONS-SCNC: 10 MMOL/L (ref 7–19)
BUN SERPL-MCNC: 15 MG/DL (ref 8–23)
CALCIUM SERPL-MCNC: 8.4 MG/DL (ref 8.8–10.2)
CHLORIDE SERPL-SCNC: 105 MMOL/L (ref 98–111)
CO2 SERPL-SCNC: 25 MMOL/L (ref 22–29)
CREAT SERPL-MCNC: 0.9 MG/DL (ref 0.5–1.2)
GLUCOSE BLD-MCNC: 129 MG/DL (ref 70–99)
GLUCOSE BLD-MCNC: 167 MG/DL (ref 70–99)
GLUCOSE BLD-MCNC: 203 MG/DL (ref 70–99)
GLUCOSE BLD-MCNC: 221 MG/DL (ref 70–99)
GLUCOSE SERPL-MCNC: 225 MG/DL (ref 74–109)
HCT VFR BLD AUTO: 36.5 % (ref 42–52)
HGB BLD-MCNC: 11.4 G/DL (ref 14–18)
PERFORMED ON: ABNORMAL
POTASSIUM SERPL-SCNC: 4.3 MMOL/L (ref 3.5–5)
SODIUM SERPL-SCNC: 140 MMOL/L (ref 136–145)

## 2023-06-01 PROCEDURE — G0378 HOSPITAL OBSERVATION PER HR: HCPCS

## 2023-06-01 PROCEDURE — 80048 BASIC METABOLIC PNL TOTAL CA: CPT

## 2023-06-01 PROCEDURE — 85014 HEMATOCRIT: CPT

## 2023-06-01 PROCEDURE — 96374 THER/PROPH/DIAG INJ IV PUSH: CPT

## 2023-06-01 PROCEDURE — 2500000003 HC RX 250 WO HCPCS: Performed by: ORTHOPAEDIC SURGERY

## 2023-06-01 PROCEDURE — 6360000002 HC RX W HCPCS: Performed by: ORTHOPAEDIC SURGERY

## 2023-06-01 PROCEDURE — 97116 GAIT TRAINING THERAPY: CPT

## 2023-06-01 PROCEDURE — 36415 COLL VENOUS BLD VENIPUNCTURE: CPT

## 2023-06-01 PROCEDURE — 82962 GLUCOSE BLOOD TEST: CPT

## 2023-06-01 PROCEDURE — 85018 HEMOGLOBIN: CPT

## 2023-06-01 PROCEDURE — 97162 PT EVAL MOD COMPLEX 30 MIN: CPT

## 2023-06-01 PROCEDURE — 94760 N-INVAS EAR/PLS OXIMETRY 1: CPT

## 2023-06-01 PROCEDURE — 96376 TX/PRO/DX INJ SAME DRUG ADON: CPT

## 2023-06-01 PROCEDURE — 97530 THERAPEUTIC ACTIVITIES: CPT

## 2023-06-01 PROCEDURE — 2580000003 HC RX 258: Performed by: ORTHOPAEDIC SURGERY

## 2023-06-01 PROCEDURE — 6370000000 HC RX 637 (ALT 250 FOR IP): Performed by: FAMILY MEDICINE

## 2023-06-01 PROCEDURE — 6370000000 HC RX 637 (ALT 250 FOR IP): Performed by: ORTHOPAEDIC SURGERY

## 2023-06-01 RX ORDER — DEXTROSE MONOHYDRATE 100 MG/ML
INJECTION, SOLUTION INTRAVENOUS CONTINUOUS PRN
Status: DISCONTINUED | OUTPATIENT
Start: 2023-06-01 | End: 2023-06-03 | Stop reason: HOSPADM

## 2023-06-01 RX ORDER — INSULIN LISPRO 100 [IU]/ML
0-4 INJECTION, SOLUTION INTRAVENOUS; SUBCUTANEOUS
Status: DISCONTINUED | OUTPATIENT
Start: 2023-06-01 | End: 2023-06-03 | Stop reason: HOSPADM

## 2023-06-01 RX ORDER — INSULIN LISPRO 100 [IU]/ML
0-4 INJECTION, SOLUTION INTRAVENOUS; SUBCUTANEOUS NIGHTLY
Status: DISCONTINUED | OUTPATIENT
Start: 2023-06-01 | End: 2023-06-03 | Stop reason: HOSPADM

## 2023-06-01 RX ADMIN — ASPIRIN 81 MG: 81 TABLET, COATED ORAL at 08:21

## 2023-06-01 RX ADMIN — SODIUM CHLORIDE: 9 INJECTION, SOLUTION INTRAVENOUS at 04:09

## 2023-06-01 RX ADMIN — ACETAMINOPHEN 650 MG: 325 TABLET ORAL at 17:00

## 2023-06-01 RX ADMIN — TRAMADOL HYDROCHLORIDE 50 MG: 50 TABLET, COATED ORAL at 17:00

## 2023-06-01 RX ADMIN — OXYCODONE 10 MG: 5 TABLET ORAL at 06:01

## 2023-06-01 RX ADMIN — ACETAMINOPHEN 650 MG: 325 TABLET ORAL at 04:06

## 2023-06-01 RX ADMIN — EMPAGLIFLOZIN 25 MG: 25 TABLET, FILM COATED ORAL at 08:23

## 2023-06-01 RX ADMIN — OXYCODONE 10 MG: 5 TABLET ORAL at 02:28

## 2023-06-01 RX ADMIN — INSULIN LISPRO 1 UNITS: 100 INJECTION, SOLUTION INTRAVENOUS; SUBCUTANEOUS at 12:59

## 2023-06-01 RX ADMIN — APIXABAN 5 MG: 5 TABLET, FILM COATED ORAL at 21:38

## 2023-06-01 RX ADMIN — TRAMADOL HYDROCHLORIDE 50 MG: 50 TABLET, COATED ORAL at 10:25

## 2023-06-01 RX ADMIN — APIXABAN 5 MG: 5 TABLET, FILM COATED ORAL at 06:01

## 2023-06-01 RX ADMIN — OXYCODONE 10 MG: 5 TABLET ORAL at 11:33

## 2023-06-01 RX ADMIN — OXYCODONE HYDROCHLORIDE 5 MG: 5 TABLET ORAL at 21:40

## 2023-06-01 RX ADMIN — CLINDAMYCIN IN 5 PERCENT DEXTROSE 900 MG: 18 INJECTION, SOLUTION INTRAVENOUS at 06:38

## 2023-06-01 RX ADMIN — CLINDAMYCIN IN 5 PERCENT DEXTROSE 900 MG: 18 INJECTION, SOLUTION INTRAVENOUS at 21:44

## 2023-06-01 RX ADMIN — OXYCODONE 10 MG: 5 TABLET ORAL at 15:52

## 2023-06-01 RX ADMIN — CLINDAMYCIN IN 5 PERCENT DEXTROSE 900 MG: 18 INJECTION, SOLUTION INTRAVENOUS at 14:52

## 2023-06-01 RX ADMIN — METFORMIN HYDROCHLORIDE 1000 MG: 500 TABLET ORAL at 08:21

## 2023-06-01 RX ADMIN — HYDROMORPHONE HYDROCHLORIDE 0.5 MG: 1 INJECTION, SOLUTION INTRAMUSCULAR; INTRAVENOUS; SUBCUTANEOUS at 09:40

## 2023-06-01 RX ADMIN — HYDROMORPHONE HYDROCHLORIDE 1 MG: 1 INJECTION, SOLUTION INTRAMUSCULAR; INTRAVENOUS; SUBCUTANEOUS at 18:03

## 2023-06-01 RX ADMIN — HYDROMORPHONE HYDROCHLORIDE 0.5 MG: 1 INJECTION, SOLUTION INTRAMUSCULAR; INTRAVENOUS; SUBCUTANEOUS at 14:49

## 2023-06-01 RX ADMIN — METFORMIN HYDROCHLORIDE 1000 MG: 500 TABLET ORAL at 17:00

## 2023-06-01 RX ADMIN — ATORVASTATIN CALCIUM 40 MG: 40 TABLET, FILM COATED ORAL at 08:23

## 2023-06-01 RX ADMIN — PROPAFENONE HYDROCHLORIDE 300 MG: 150 TABLET, FILM COATED ORAL at 06:01

## 2023-06-01 RX ADMIN — CEFAZOLIN 3000 MG: 10 INJECTION, POWDER, FOR SOLUTION INTRAVENOUS at 04:18

## 2023-06-01 RX ADMIN — ACETAMINOPHEN 650 MG: 325 TABLET ORAL at 21:37

## 2023-06-01 RX ADMIN — TRAMADOL HYDROCHLORIDE 50 MG: 50 TABLET, COATED ORAL at 21:38

## 2023-06-01 RX ADMIN — OXYCODONE 10 MG: 5 TABLET ORAL at 21:37

## 2023-06-01 RX ADMIN — BISACODYL 5 MG: 5 TABLET, COATED ORAL at 08:21

## 2023-06-01 RX ADMIN — ACETAMINOPHEN 650 MG: 325 TABLET ORAL at 08:21

## 2023-06-01 RX ADMIN — TRAMADOL HYDROCHLORIDE 50 MG: 50 TABLET, COATED ORAL at 04:06

## 2023-06-01 RX ADMIN — PROPAFENONE HYDROCHLORIDE 300 MG: 150 TABLET, FILM COATED ORAL at 21:37

## 2023-06-01 ASSESSMENT — PAIN DESCRIPTION - LOCATION
LOCATION: KNEE

## 2023-06-01 ASSESSMENT — PAIN DESCRIPTION - PAIN TYPE: TYPE: ACUTE PAIN;SURGICAL PAIN

## 2023-06-01 ASSESSMENT — PAIN DESCRIPTION - ORIENTATION
ORIENTATION: LEFT

## 2023-06-01 ASSESSMENT — PAIN - FUNCTIONAL ASSESSMENT
PAIN_FUNCTIONAL_ASSESSMENT: PREVENTS OR INTERFERES SOME ACTIVE ACTIVITIES AND ADLS

## 2023-06-01 ASSESSMENT — PAIN DESCRIPTION - DESCRIPTORS
DESCRIPTORS: ACHING

## 2023-06-01 ASSESSMENT — PAIN SCALES - GENERAL
PAINLEVEL_OUTOF10: 10
PAINLEVEL_OUTOF10: 6
PAINLEVEL_OUTOF10: 8
PAINLEVEL_OUTOF10: 9
PAINLEVEL_OUTOF10: 8

## 2023-06-01 NOTE — CARE COORDINATION
Spoke with patient regarding MD orders for [place] services. Patient agreeable and has chosen [facility]. Referral Faxed. [address] [place] [address] [phone]. [phone]. Please notify [facility] when patient discharges and fax DC Summary,  DC med list and any new [place] orders. The Patient and/or patient representative was provided with a choice of provider and agrees   with the discharge plan. [x] Yes [] No    Freedom of choice list was provided with basic dialogue that supports the patient's individualized plan of care/goals, treatment preferences and shares the quality data associated with the providers.  [x] Yes [] No  Electronically signed by Grace Petersen on 6/1/2023 at 9:59 AM

## 2023-06-01 NOTE — CONSULTS
Department of Family Practice  Consult Note        Reason for Consult: Postoperative management of type 2 diabetes and other chronic medical problems  Requesting Physician: Dr. Demetrio Brunner: Left knee pain    History Obtained From:  patient, spouse    HISTORY OF PRESENT ILLNESS:              The patient is a 79 y.o. male with significant past medical history of hypertension and type 2 diabetes who presents with end-stage osteoarthritis of the left knee. He has been dealing with left knee pain for some time. He has been followed by orthopedic surgery and all conservative measures attempted to give him relief. Despite best efforts continued to have pain and limited functional mobility which resulted in recommendation for total knee arthroplasty. He was taken the operating room yesterday for total knee arthroplasty. Surgery went well. States he is doing fine postoperatively with controlled pain. He has not been up to ambulate yet with physical therapy. Past Medical History:        Diagnosis Date    Arthritis     Diabetes mellitus (HCC)     Hx of blood clots     hx of pe    Hyperlipidemia     Cholesterol management per his pcp.      Hypertension     Knee pain     Paroxysmal atrial fibrillation [facility])     sees dr. jensen/     Pilonidal cyst     Sleep apnea     prn cpap     Past Surgical History:        Procedure Laterality Date    APPENDECTOMY      BACK SURGERY      CHOLECYSTECTOMY, LAPAROSCOPIC  10/16/03    w/lysis of adhesions and hernia repair-Dr Tatiana Lancasetr    COLONOSCOPY  10/5/10    Dr Teresa Blas-internal hemorrhoids, bx not available    COLONOSCOPY N/A 7/15/2016    Dr Beny Allen, 10 yr recall    KNEE SURGERY Bilateral     PILONIDAL CYST EXCISION      TOTAL KNEE ARTHROPLASTY Left 5/31/2023    ROBOTIC LEFT KNEE TOTAL ARTHROPLASTY COMPLEX performed by Radu Parker MD at [address] [facility] [address]  10/16/03    w/qamar-Dr Tatiana Lancaster    UPPER GASTROINTESTINAL ENDOSCOPY N/A 7/15/2016    Dr Dot Brower

## 2023-06-01 NOTE — CARE COORDINATION
Wendy Calderon RN Ortho Navigator       Patient would like dme items to be delivered to [facility]. [phone]. Please call if you have any questions.   Electronically signed by Patricia Bach RN on 6/1/2023 at 7:45 AM  Rhonda Quiros  5/31/2023  7:38 AM   Admission  Description: 79year old male Department: [facility] Surg Services     Patient Demographics    Name Patient ID SSN Gender Identity Birth Date   Shannen Pastsonya \"ESTEPHANIE\" [id number] Male [birth year] (79 yrs)     Address Phone Email     [address] [place] [phone] (P)   [phone] (M) [email or web address]       Reg Status PCP Date Last Verified Next Review Date    Verified Indio Kirkpatrick MD  [phone] 05/10/23 06/09/23      Insurance Payors as of 6/1/2023    [facility] MEDICARE    Plan: Alicia Alvarez MEDICARE Group: [id number] Member: [id number]   Effective from: 1/1/2020 Subscriber: Vincent Bello Subscriber ID: [id number]   Guarantor: Vincent Bello     Patient Contacts    Name Relation Home Work Mobile   Connie Chand Spouse [phone]       Electronically signed by Patricia Bach RN on 6/1/2023 at 7:58 AM

## 2023-06-01 NOTE — ANESTHESIA POST-OP
Anesthesia Post-op Note    Cristal Land    Procedure(s):  ROBOTIC LEFT KNEE TOTAL ARTHROPLASTY COMPLEX    Patient location: [facility] Surgical Floor    Anesthesia type: general and regional    Post-op pain: adequate analgesia    Post-op assessment: no nausea, pain decently controlled    Post-op vital signs: stable    Level of consciousness: awake, alert, and oriented    Complications: none    Julia Lloyd MD

## 2023-06-01 NOTE — OP NOTE
[facility] [address], [facility]                                OPERATIVE REPORT    PATIENT NAME: Ganesh Dale                      :        1955  MED REC NO:   [id number]                              ROOM:       [facility]  ACCOUNT NO:   [de-identified]                           ADMIT DATE: 2023  PROVIDER:     Juani Greenwood MD    DATE OF PROCEDURE:  2023    PREOPERATIVE DIAGNOSES:  1. Primary osteoarthritis, left knee. 2.  History of pulmonary embolus, unprovoked, 2022. POSTOPERATIVE DIAGNOSES:  1. Primary osteoarthritis, left knee. 2.  History of pulmonary embolus, unprovoked, 2022. OPERATION PERFORMED:  Complex primary robotic-assisted left knee  replacement. Please note complexity of the surgery is due to the fact  the patient was done without tourniquet while fully anticoagulated, and  this added 100% increase in difficulty exposure and placement of  implants. ANESTHESIA:  General.    EBL:  575 mL. FLUIDS:  1400 mL of crystalloid. COMPONENTS USED:  Leah Persona knee system, femur size 11 standard CR  TM press-fit femur. Tibia size G TM press-fit tibia. Polyethylene size  10 MC polyethylene. INDICATIONS:  This is a 69-year-old gentleman with severe arthritis of  the left knee, failing conservative care. Because of this, he elected  for the above procedure. Please note that prior to surgery, he had  bilateral lower extremity Doppler ultrasounds on 05/10/2023, which were  negative. The patient was told to continue his Eliquis. He did stop it  a day ago. I did talk to him about the increased risks of this and he  was given Eliquis in the preop holding area about an hour and a half  prior to the surgery. OPERATIVE PROCEDURE:  After informed consent, he was given 2 gm of  Ancef, underwent general anesthesia. Right leg was placed in SCD, left  leg was prepped and draped in the usual sterile fashion.

## 2023-06-02 LAB
ANION GAP SERPL CALCULATED.3IONS-SCNC: 10 MMOL/L (ref 7–19)
BUN SERPL-MCNC: 10 MG/DL (ref 8–23)
CALCIUM SERPL-MCNC: 8.3 MG/DL (ref 8.8–10.2)
CHLORIDE SERPL-SCNC: 105 MMOL/L (ref 98–111)
CO2 SERPL-SCNC: 25 MMOL/L (ref 22–29)
CREAT SERPL-MCNC: 0.8 MG/DL (ref 0.5–1.2)
GLUCOSE BLD-MCNC: 144 MG/DL (ref 70–99)
GLUCOSE BLD-MCNC: 155 MG/DL (ref 70–99)
GLUCOSE BLD-MCNC: 162 MG/DL (ref 70–99)
GLUCOSE BLD-MCNC: 184 MG/DL (ref 70–99)
GLUCOSE SERPL-MCNC: 142 MG/DL (ref 74–109)
HCT VFR BLD AUTO: 37.5 % (ref 42–52)
HGB BLD-MCNC: 11.5 G/DL (ref 14–18)
PERFORMED ON: ABNORMAL
POTASSIUM SERPL-SCNC: 4.1 MMOL/L (ref 3.5–5)
SODIUM SERPL-SCNC: 140 MMOL/L (ref 136–145)

## 2023-06-02 PROCEDURE — 6370000000 HC RX 637 (ALT 250 FOR IP): Performed by: ORTHOPAEDIC SURGERY

## 2023-06-02 PROCEDURE — 2500000003 HC RX 250 WO HCPCS: Performed by: ORTHOPAEDIC SURGERY

## 2023-06-02 PROCEDURE — 6360000002 HC RX W HCPCS: Performed by: ORTHOPAEDIC SURGERY

## 2023-06-02 PROCEDURE — 85018 HEMOGLOBIN: CPT

## 2023-06-02 PROCEDURE — G0378 HOSPITAL OBSERVATION PER HR: HCPCS

## 2023-06-02 PROCEDURE — 80048 BASIC METABOLIC PNL TOTAL CA: CPT

## 2023-06-02 PROCEDURE — 36415 COLL VENOUS BLD VENIPUNCTURE: CPT

## 2023-06-02 PROCEDURE — 96376 TX/PRO/DX INJ SAME DRUG ADON: CPT

## 2023-06-02 PROCEDURE — 82962 GLUCOSE BLOOD TEST: CPT

## 2023-06-02 PROCEDURE — 2580000003 HC RX 258: Performed by: ORTHOPAEDIC SURGERY

## 2023-06-02 PROCEDURE — 97116 GAIT TRAINING THERAPY: CPT

## 2023-06-02 PROCEDURE — 85014 HEMATOCRIT: CPT

## 2023-06-02 PROCEDURE — 94760 N-INVAS EAR/PLS OXIMETRY 1: CPT

## 2023-06-02 RX ORDER — OXYCODONE HYDROCHLORIDE 10 MG/1
10 TABLET ORAL EVERY 4 HOURS PRN
Qty: 60 TABLET | Refills: 0 | Status: SHIPPED | OUTPATIENT
Start: 2023-06-02 | End: 2023-07-02

## 2023-06-02 RX ADMIN — SODIUM CHLORIDE, PRESERVATIVE FREE 10 ML: 5 INJECTION INTRAVENOUS at 23:49

## 2023-06-02 RX ADMIN — HYDROCHLOROTHIAZIDE 12.5 MG: 12.5 CAPSULE ORAL at 09:26

## 2023-06-02 RX ADMIN — ACETAMINOPHEN 650 MG: 325 TABLET ORAL at 09:25

## 2023-06-02 RX ADMIN — CLINDAMYCIN IN 5 PERCENT DEXTROSE 900 MG: 18 INJECTION, SOLUTION INTRAVENOUS at 07:33

## 2023-06-02 RX ADMIN — PROPAFENONE HYDROCHLORIDE 300 MG: 150 TABLET, FILM COATED ORAL at 09:26

## 2023-06-02 RX ADMIN — EMPAGLIFLOZIN 25 MG: 25 TABLET, FILM COATED ORAL at 09:26

## 2023-06-02 RX ADMIN — OXYCODONE 10 MG: 5 TABLET ORAL at 13:26

## 2023-06-02 RX ADMIN — ACETAMINOPHEN 650 MG: 325 TABLET ORAL at 19:36

## 2023-06-02 RX ADMIN — DIPHENHYDRAMINE HYDROCHLORIDE 25 MG: 25 TABLET ORAL at 21:53

## 2023-06-02 RX ADMIN — SODIUM CHLORIDE, PRESERVATIVE FREE 10 ML: 5 INJECTION INTRAVENOUS at 09:26

## 2023-06-02 RX ADMIN — PROPAFENONE HYDROCHLORIDE 300 MG: 150 TABLET, FILM COATED ORAL at 14:57

## 2023-06-02 RX ADMIN — APIXABAN 5 MG: 5 TABLET, FILM COATED ORAL at 09:26

## 2023-06-02 RX ADMIN — PROPAFENONE HYDROCHLORIDE 300 MG: 150 TABLET, FILM COATED ORAL at 21:53

## 2023-06-02 RX ADMIN — LOSARTAN POTASSIUM 100 MG: 100 TABLET, FILM COATED ORAL at 09:26

## 2023-06-02 RX ADMIN — HYDROMORPHONE HYDROCHLORIDE 1 MG: 1 INJECTION, SOLUTION INTRAMUSCULAR; INTRAVENOUS; SUBCUTANEOUS at 00:31

## 2023-06-02 RX ADMIN — ATORVASTATIN CALCIUM 40 MG: 40 TABLET, FILM COATED ORAL at 09:26

## 2023-06-02 RX ADMIN — OXYCODONE 15 MG: 5 TABLET ORAL at 09:25

## 2023-06-02 RX ADMIN — ACETAMINOPHEN 650 MG: 325 TABLET ORAL at 14:57

## 2023-06-02 RX ADMIN — APIXABAN 5 MG: 5 TABLET, FILM COATED ORAL at 19:38

## 2023-06-02 RX ADMIN — TRAMADOL HYDROCHLORIDE 50 MG: 50 TABLET, COATED ORAL at 05:33

## 2023-06-02 RX ADMIN — ACETAMINOPHEN 650 MG: 325 TABLET ORAL at 05:33

## 2023-06-02 RX ADMIN — METFORMIN HYDROCHLORIDE 1000 MG: 500 TABLET ORAL at 09:28

## 2023-06-02 RX ADMIN — ASPIRIN 81 MG: 81 TABLET, COATED ORAL at 09:26

## 2023-06-02 RX ADMIN — TRAMADOL HYDROCHLORIDE 50 MG: 50 TABLET, COATED ORAL at 09:26

## 2023-06-02 RX ADMIN — TRAMADOL HYDROCHLORIDE 50 MG: 50 TABLET, COATED ORAL at 19:38

## 2023-06-02 RX ADMIN — OXYCODONE 10 MG: 5 TABLET ORAL at 05:33

## 2023-06-02 RX ADMIN — OXYCODONE 15 MG: 5 TABLET ORAL at 17:48

## 2023-06-02 RX ADMIN — BISACODYL 5 MG: 5 TABLET, COATED ORAL at 09:26

## 2023-06-02 RX ADMIN — TRAMADOL HYDROCHLORIDE 50 MG: 50 TABLET, COATED ORAL at 14:57

## 2023-06-02 RX ADMIN — OXYCODONE 15 MG: 5 TABLET ORAL at 21:53

## 2023-06-02 ASSESSMENT — PAIN SCALES - GENERAL
PAINLEVEL_OUTOF10: 9
PAINLEVEL_OUTOF10: 9
PAINLEVEL_OUTOF10: 7
PAINLEVEL_OUTOF10: 6
PAINLEVEL_OUTOF10: 7
PAINLEVEL_OUTOF10: 7
PAINLEVEL_OUTOF10: 6

## 2023-06-02 ASSESSMENT — PAIN DESCRIPTION - LOCATION
LOCATION: KNEE

## 2023-06-02 ASSESSMENT — PAIN DESCRIPTION - ORIENTATION
ORIENTATION: LEFT

## 2023-06-02 ASSESSMENT — PAIN DESCRIPTION - DESCRIPTORS
DESCRIPTORS: ACHING
DESCRIPTORS: ACHING;SORE
DESCRIPTORS: ACHING
DESCRIPTORS: CRAMPING

## 2023-06-02 ASSESSMENT — PAIN - FUNCTIONAL ASSESSMENT
PAIN_FUNCTIONAL_ASSESSMENT: ACTIVITIES ARE NOT PREVENTED
PAIN_FUNCTIONAL_ASSESSMENT: ACTIVITIES ARE NOT PREVENTED

## 2023-06-02 ASSESSMENT — PAIN DESCRIPTION - PAIN TYPE: TYPE: ACUTE PAIN;SURGICAL PAIN

## 2023-06-02 NOTE — ACP (ADVANCE CARE PLANNING)
Advance Care Planning   The patient has the following advanced directives on file:  Advance Directives       Power of [address] Will ACP-Advance Directive ACP-Power of     Not on File Not on File Not on File Not on File            The patient has appointed the following active healthcare agents:    Primary Decision Maker: Connie Chand - Spouse - [phone]    The Patient has the following current code status:    Code Status: Full Code    [place] Record  6/2/2023

## 2023-06-02 NOTE — DISCHARGE SUMMARY
Orthopedic [facility]  Dr. Joseline Alfonso  Discharge Summary     Elinor Winston is a 79 y.o. male underwent left total knee replacement procedure without complication. Elinor Winston was admitted to the floor following his   recovery in the PACU.      Discharge Diagnosis   Left Knee Replacement    Current Inpatient Medications    Current Facility-Administered Medications: glucose chewable tablet 16 g, 4 tablet, Oral, PRN  dextrose bolus 10% 125 mL, 125 mL, IntraVENous, PRN **OR** dextrose bolus 10% 250 mL, 250 mL, IntraVENous, PRN  glucagon (rDNA) injection 1 mg, 1 mg, SubCUTAneous, PRN  dextrose 10 % infusion, , IntraVENous, Continuous PRN  insulin lispro (HUMALOG) injection vial 0-4 Units, 0-4 Units, SubCUTAneous, TID WC  insulin lispro (HUMALOG) injection vial 0-4 Units, 0-4 Units, SubCUTAneous, Nightly  aspirin EC tablet 81 mg, 81 mg, Oral, Daily  metFORMIN (GLUCOPHAGE) tablet 1,000 mg, 1,000 mg, Oral, BID WC  atorvastatin (LIPITOR) tablet 40 mg, 40 mg, Oral, Daily  propafenone (RYTHMOL) tablet 300 mg, 300 mg, Oral, Q8H  empagliflozin (JARDIANCE) tablet 25 mg, 25 mg, Oral, Daily  apixaban (ELIQUIS) tablet 5 mg, 5 mg, Oral, BID  0.9 % sodium chloride infusion, , IntraVENous, Continuous  0.9 % sodium chloride bolus, 500 mL, IntraVENous, PRN  sodium chloride flush 0.9 % injection 5-40 mL, 5-40 mL, IntraVENous, 2 times per day  sodium chloride flush 0.9 % injection 5-40 mL, 5-40 mL, IntraVENous, PRN  0.9 % sodium chloride infusion, , IntraVENous, PRN  acetaminophen (TYLENOL) tablet 650 mg, 650 mg, Oral, Q6H  bisacodyl (DULCOLAX) EC tablet 5 mg, 5 mg, Oral, Daily  ondansetron (ZOFRAN-ODT) disintegrating tablet 4 mg, 4 mg, Oral, Q8H PRN **OR** ondansetron (ZOFRAN) injection 4 mg, 4 mg, IntraVENous, Q6H PRN  oxyCODONE (ROXICODONE) immediate release tablet 5 mg, 5 mg, Oral, Q4H PRN **OR** oxyCODONE (ROXICODONE) immediate release tablet 10 mg, 10 mg, Oral, Q4H PRN **OR** oxyCODONE (ROXICODONE) immediate

## 2023-06-03 ENCOUNTER — READMISSION MANAGEMENT (OUTPATIENT)
Dept: CALL CENTER | Facility: HOSPITAL | Age: 68
End: 2023-06-03

## 2023-06-03 VITALS
BODY MASS INDEX: 35.55 KG/M2 | RESPIRATION RATE: 14 BRPM | WEIGHT: 277 LBS | DIASTOLIC BLOOD PRESSURE: 65 MMHG | HEIGHT: 74 IN | SYSTOLIC BLOOD PRESSURE: 124 MMHG | TEMPERATURE: 98.1 F | HEART RATE: 83 BPM | OXYGEN SATURATION: 94 %

## 2023-06-03 LAB
ANION GAP SERPL CALCULATED.3IONS-SCNC: 13 MMOL/L (ref 7–19)
BUN SERPL-MCNC: 10 MG/DL (ref 8–23)
CALCIUM SERPL-MCNC: 8.8 MG/DL (ref 8.8–10.2)
CHLORIDE SERPL-SCNC: 102 MMOL/L (ref 98–111)
CO2 SERPL-SCNC: 25 MMOL/L (ref 22–29)
CREAT SERPL-MCNC: 0.9 MG/DL (ref 0.5–1.2)
GLUCOSE BLD-MCNC: 117 MG/DL (ref 70–99)
GLUCOSE SERPL-MCNC: 123 MG/DL (ref 74–109)
HCT VFR BLD AUTO: 35.7 % (ref 42–52)
HGB BLD-MCNC: 11 G/DL (ref 14–18)
PERFORMED ON: ABNORMAL
POTASSIUM SERPL-SCNC: 3.8 MMOL/L (ref 3.5–5)
SODIUM SERPL-SCNC: 140 MMOL/L (ref 136–145)

## 2023-06-03 PROCEDURE — 6370000000 HC RX 637 (ALT 250 FOR IP): Performed by: ORTHOPAEDIC SURGERY

## 2023-06-03 PROCEDURE — 2580000003 HC RX 258: Performed by: ORTHOPAEDIC SURGERY

## 2023-06-03 PROCEDURE — 36415 COLL VENOUS BLD VENIPUNCTURE: CPT

## 2023-06-03 PROCEDURE — 82962 GLUCOSE BLOOD TEST: CPT

## 2023-06-03 PROCEDURE — 80048 BASIC METABOLIC PNL TOTAL CA: CPT

## 2023-06-03 PROCEDURE — G0378 HOSPITAL OBSERVATION PER HR: HCPCS

## 2023-06-03 PROCEDURE — 85018 HEMOGLOBIN: CPT

## 2023-06-03 PROCEDURE — 85014 HEMATOCRIT: CPT

## 2023-06-03 RX ADMIN — OXYCODONE 15 MG: 5 TABLET ORAL at 04:00

## 2023-06-03 RX ADMIN — SODIUM CHLORIDE, PRESERVATIVE FREE 10 ML: 5 INJECTION INTRAVENOUS at 09:09

## 2023-06-03 RX ADMIN — BISACODYL 5 MG: 5 TABLET, COATED ORAL at 09:08

## 2023-06-03 RX ADMIN — ACETAMINOPHEN 650 MG: 325 TABLET ORAL at 04:00

## 2023-06-03 RX ADMIN — ATORVASTATIN CALCIUM 40 MG: 40 TABLET, FILM COATED ORAL at 09:08

## 2023-06-03 RX ADMIN — EMPAGLIFLOZIN 25 MG: 25 TABLET, FILM COATED ORAL at 09:08

## 2023-06-03 RX ADMIN — HYDROCHLOROTHIAZIDE 12.5 MG: 12.5 CAPSULE ORAL at 09:08

## 2023-06-03 RX ADMIN — APIXABAN 5 MG: 5 TABLET, FILM COATED ORAL at 09:08

## 2023-06-03 RX ADMIN — ASPIRIN 81 MG: 81 TABLET, COATED ORAL at 09:08

## 2023-06-03 RX ADMIN — PROPAFENONE HYDROCHLORIDE 300 MG: 150 TABLET, FILM COATED ORAL at 06:38

## 2023-06-03 RX ADMIN — TRAMADOL HYDROCHLORIDE 50 MG: 50 TABLET, COATED ORAL at 09:08

## 2023-06-03 RX ADMIN — TRAMADOL HYDROCHLORIDE 50 MG: 50 TABLET, COATED ORAL at 04:00

## 2023-06-03 RX ADMIN — ACETAMINOPHEN 650 MG: 325 TABLET ORAL at 09:07

## 2023-06-03 RX ADMIN — LOSARTAN POTASSIUM 100 MG: 100 TABLET, FILM COATED ORAL at 09:08

## 2023-06-03 RX ADMIN — METFORMIN HYDROCHLORIDE 1000 MG: 500 TABLET ORAL at 08:14

## 2023-06-03 ASSESSMENT — PAIN DESCRIPTION - ORIENTATION
ORIENTATION: LEFT
ORIENTATION: LEFT

## 2023-06-03 ASSESSMENT — PAIN SCALES - GENERAL
PAINLEVEL_OUTOF10: 4
PAINLEVEL_OUTOF10: 6
PAINLEVEL_OUTOF10: 5

## 2023-06-03 ASSESSMENT — PAIN DESCRIPTION - LOCATION
LOCATION: KNEE
LOCATION: KNEE

## 2023-06-03 ASSESSMENT — PAIN DESCRIPTION - DESCRIPTORS
DESCRIPTORS: ACHING
DESCRIPTORS: ACHING

## 2023-06-03 NOTE — PROGRESS NOTES
Bed extender placed on bed.
CLINICAL PHARMACY NOTE: MEDS TO BEDS    Total # of Prescriptions Filled: 1   The following medications were delivered to the patient:  Current Discharge Medication List        START taking these medications    Details   oxyCODONE (OXY-IR) 10 MG immediate release tablet Take 1 tablet by mouth every 4 hours as needed for Pain for up to 30 days. Max Daily Amount: 60 mg  Qty: 60 tablet, Refills: 0    Comments: Reduce doses taken as pain becomes manageable  Associated Diagnoses: Primary osteoarthritis of left knee               Additional Documentation:    Delivered RX's to patient bedside. Also dispensed free Narcan. Paid with cash.
[facility]  Daily Progress Note  Jessica Trammell  MRN: [id number] LOS: 0    Admit Date: 5/31/2023   6/3/2023 8:07 AM        Subjective:          Chief Complaint:  Primary osteoarthritis of left knee     HISTORY OF PRESENT ILLNESS:               The patient is a 79 y.o. male with significant past medical history of hypertension and type 2 diabetes who presents with end-stage osteoarthritis of the left knee. He has been dealing with left knee pain for some time. He has been followed by orthopedic surgery and all conservative measures attempted to give him relief. Despite best efforts continued to have pain and limited functional mobility which resulted in recommendation for total knee arthroplasty. He was taken the operating room yesterday for total knee arthroplasty. Surgery went well. States he is doing fine postoperatively with controlled pain. For D/C home later today W/ H/H. Interval History:    Reviewed overnight events and nursing notes. Status:  improved  Pain:  some relief    PMH:  Past Medical History:   Diagnosis Date    Arthritis     Diabetes mellitus ([facility] [address]     Hx of blood clots     hx of pe    Hyperlipidemia     Cholesterol management per his pcp. Hypertension     Knee pain     Paroxysmal atrial fibrillation ([facility])     sees dr. jensen/     Pilonidal cyst     Sleep apnea     prn cpap       ROS:  10 point ROS obtained:   Gen: No fevers  HEENT: No migraine or visual change  Lung: No cough, hemopotysis or wheezing  Cv: No chest pain or pressure  Abd: No nausea or vomit, no change in bowel fct, no gi bleeding  Ext: No inc pain or swelling  Neuro: No seizure or syncope  Skin: No new rashes  Endo: No polyuria, polyphagia or poilydypsia  Psyc: No inc anxiety or depression  MS: Left Knee pain     DIET:  ADULT DIET;  Regular    Medications:      dextrose      sodium chloride 150 mL/hr at 06/01/23 0409    sodium chloride        insulin lispro  0-4 Units SubCUTAneous TID WC
Family Medicine Progress Note    Patient:  Cyril Kuo  YOB: 1955    MRN: [id number]     Acct: [de-identified]     Admit date: 5/31/2023    Patient Seen, Chart, Consults notes, Labs, Radiology studies reviewed. Subjective: Day 0 of stay with end-stage osteoarthritis of left knee status post total knee arthroplasty and most recent (in last 24 hours) has had reasonably rough overnight. Starting about 2:00 yesterday had significant increase in pain which he states lasted till about 8:00 last night when he is finally able to get some relief. Has pain ongoing now. Is apprehensive about going home and being able to ambulate. Past, Family, Social History unchanged from admission. Diet:  ADULT DIET;  Regular    Medications:  Scheduled Meds:   insulin lispro  0-4 Units SubCUTAneous TID WC    insulin lispro  0-4 Units SubCUTAneous Nightly    aspirin  81 mg Oral Daily    metFORMIN  1,000 mg Oral BID WC    atorvastatin  40 mg Oral Daily    propafenone  300 mg Oral Q8H    empagliflozin  25 mg Oral Daily    apixaban  5 mg Oral BID    sodium chloride flush  5-40 mL IntraVENous 2 times per day    acetaminophen  650 mg Oral Q6H    bisacodyl  5 mg Oral Daily    traMADol  50 mg Oral Q6H    clindamycin (CLEOCIN) IV  900 mg IntraVENous Q8H    losartan  100 mg Oral Daily    And    hydroCHLOROthiazide  12.5 mg Oral Daily     Continuous Infusions:   dextrose      sodium chloride 150 mL/hr at 06/01/23 0409    sodium chloride       PRN Meds:glucose, dextrose bolus **OR** dextrose bolus, glucagon (rDNA), dextrose, sodium chloride, sodium chloride flush, sodium chloride, ondansetron **OR** ondansetron, oxyCODONE **OR** oxyCODONE **OR** oxyCODONE, HYDROmorphone **OR** HYDROmorphone **OR** HYDROmorphone, diphenhydrAMINE    Objective:    Vitals: BP (!) 140/71   Pulse 94   Temp 98.1 °F (36.7 °C) (Temporal)   Resp 16   Ht 6' 2\" (1.88 m)   Wt 277 lb (125.6 kg)   SpO2 92%   BMI 35.56 kg/m²   24 hour
Physical Therapy  Name: Cherelle Yanes  MRN:  [id number]  Date of service:  6/1/2023 06/01/23 1428   Restrictions/Precautions   Restrictions/Precautions Fall Risk;Weight Bearing;Surgical Protocols   Required Braces or Orthoses? No   Lower Extremity Weight Bearing Restrictions   Left Lower Extremity Weight Bearing Weight Bearing As Tolerated   Subjective   Subjective Pt in recliner, agrees to walk and would like to get back in bed. Pain Assessment   Pain Assessment 0-10   Pain Level 8   Pain Location Knee   Pain Orientation Left   Pain Descriptors Aching   Functional Pain Assessment Prevents or interferes some active activities and ADLs   Pain Type Acute pain;Surgical pain   Bed Mobility   Sit to Supine Stand by assistance   Transfers   Sit to Stand Minimal Assistance   Stand to Sit Contact guard assistance   Ambulation   WB Status FWBAT LLE   Ambulation   Surface Level tile   Device Rolling Walker   Assistance Contact guard assistance   Quality of Gait step-to gait pattern, antalgic   Gait Deviations Slow Summer;Decreased step length;Decreased step height   Distance 25'   Short Term Goals   Time Frame for Short Term Goals 2 wks   Short Term Goal 1 supine to sit indep   Short Term Goal 2 sit to stand indep   Short Term Goal 3 amb. 100' with RW SBA   Short Term Goal 4 up/down 3 stairs SBA   Conditions Requiring Skilled Therapeutic Intervention   Body Structures, Functions, Activity Limitations Requiring Skilled Therapeutic Intervention Decreased functional mobility ; Decreased ROM; Decreased strength;Decreased safe awareness;Decreased cognition;Decreased endurance;Decreased posture; Vestibular Impairment;Decreased coordination;Decreased sensation   Assessment Pt able to amb short distance in room with rwx, exhibits step-to gait pattern and difficulty accepting weight onto LLE. Pt positioned for comfort in supine with all needs in reach post gait.    Activity Tolerance   Activity Tolerance Patient tolerated treatment
Physical Therapy  Name: Cyril Kuo  MRN:  [id number]  Date of service:  6/2/2023 06/02/23 1246   Subjective   Subjective Attempt x 2 this AM with patient reporting he was in a lot of pain and was due for a pain pill soon. When therapist checked back he was sleeping soundly. Will see patient this afternoon.        Electronically signed by Franki Bull PTA on 6/2/2023 at 12:47 PM
Subjective:     Post-Operative Day: 2 Status Post left TKA. Pt is awake and alert. Ox3. He is having more pain this am.  His pain block has warn off. He was able to ambulate 55 ft. No other issues. Systemic or Specific Complaints: No Complaints  no nausea and no vomiting Pain 8    Objective:     Patient Vitals for the past 24 hrs:   BP Temp Temp src Pulse Resp SpO2   06/02/23 0719 (!) 140/71 98.1 °F (36.7 °C) Temporal 94 16 90 %   06/02/23 0415 (!) 146/65 96.8 °F (36 °C) Temporal 94 16 92 %   06/02/23 0008 (!) 142/70 100 °F (37.8 °C) Oral (!) 101 16 92 %   06/01/23 2134 (!) 145/70 100.2 °F (37.9 °C) Temporal 95 20 94 %   06/01/23 1617 (!) 130/56 97.2 °F (36.2 °C) Temporal 79 16 100 %   06/01/23 1304 (!) 120/52 97.3 °F (36.3 °C) Temporal 74 18 97 %   06/01/23 1018 (!) 106/53 97.2 °F (36.2 °C) Temporal 65 16 96 %   06/01/23 0922 -- -- -- -- -- 94 %   06/01/23 0751 (!) 109/51 96.8 °F (36 °C) Temporal 74 16 96 %       General: alert, appears stated age, cooperative, no distress, and moderately obese   Exam: Limited active motion to left lower extremity due to pain   Wound: Wound clean and dry no evidence of infection. , No Drainage and Wound Intact   Neurovascular: Exam normal     DVT Exam: No evidence of DVT seen on physical exam.   Xray:  none       Data Review:  Recent Labs     06/01/23  0149 06/02/23  0204   HGB 11.4* 11.5*     Recent Labs     06/02/23  0204      K 4.1   CREATININE 0.8     Recent Labs     06/02/23 0204   LABGLOM >60     No results for input(s): INR in the last 72 hours. Assessment:     Status Post left TKA. Plan:     Pt will continue with his current nursing cares and therapy as tolerated. Plan for home today with home health if he passes PT. He may f/u in 6 weeks.       Electronically signed by Nat Hou PA-C on 6/2/2023 at 7:36 AM
Subjective:     Post-Operative Day: 3 Status Post left TKA. Pt is awake and alert. Ox3. He is doing better with pain and wants to go home. He was able to ambulate 35 ft with PT yesterday afternoon. No other issues. Systemic or Specific Complaints: No Complaints  no nausea and no vomiting Pain 6    Objective:     Patient Vitals for the past 24 hrs:   BP Temp Temp src Pulse Resp SpO2   06/03/23 0738 124/65 98.1 °F (36.7 °C) Temporal 83 14 94 %   06/03/23 0450 138/71 99.3 °F (37.4 °C) Temporal 92 18 96 %   06/02/23 2025 (!) 151/65 99.1 °F (37.3 °C) Oral 92 16 95 %   06/02/23 1649 (!) 115/55 97 °F (36.1 °C) Temporal 80 16 94 %   06/02/23 1527 -- -- -- -- 14 --   06/02/23 1410 121/66 98.1 °F (36.7 °C) Temporal 89 16 92 %   06/02/23 1356 -- -- -- -- 16 --   06/02/23 0956 -- -- -- -- 16 --   06/02/23 0955 -- -- -- -- 16 --       General: alert, appears stated age, cooperative, no distress, and moderately obese   Exam: Limited active motion to left lower extremity due to pain   Wound: Wound clean and dry no evidence of infection. , No Drainage and Wound Intact   Neurovascular: Exam normal     DVT Exam: No evidence of DVT seen on physical exam.   Xray:  none       Data Review:  Recent Labs     06/02/23  0204 06/03/23  0247   HGB 11.5* 11.0*     Recent Labs     06/03/23  0247      K 3.8   CREATININE 0.9     Recent Labs     06/03/23  0247   LABGLOM >60     No results for input(s): INR in the last 72 hours. Assessment:     Status Post left TKA. Plan:     Pt will continue with his current nursing cares and therapy as tolerated. Plan for home today with home health. He may f/u in 6 weeks.       Electronically signed by Stevo Pagan PA-C on 6/3/2023 at 8:47 AM
X-rays done in pacu.
Duration: 2 Weeks  Current Treatment Recommendations: Strengthening, ROM, Balance training, Functional mobility training, Transfer training, Gait training, Endurance training, Patient/Caregiver education & training, Safety education & training, Equipment evaluation, education, & procurement, Therapeutic activities, Positioning  Safety Devices  Type of Devices: Gait belt, Left in chair, Call light within reach, Chair alarm in place, Patient at risk for falls, Nurse notified (ice pack L knee)     Restrictions  Restrictions/Precautions  Restrictions/Precautions: Fall Risk, Weight Bearing, Surgical Protocols  Required Braces or Orthoses?: No  Lower Extremity Weight Bearing Restrictions  Right Lower Extremity Weight Bearing: Full Weight Bearing As Tolerated      Subjective   Pain: 6/10 \"not too bad\". RN gave dilaudid after pt got into chair. General  Chart Reviewed: Yes  Patient assessed for rehabilitation services?: Yes  Response To Previous Treatment: Not applicable  Family / Caregiver Present: No  Referring Practitioner: Viviane Duarte MD  Referral Date : 05/31/23  Diagnosis: Primary OA L knee  Follows Commands: Within Functional Limits  General Comment  Comments: 5/31 robotic L TKR RN, Uzma Quiñones PT and gave pt dilaudid after getting in chair. Subjective  Subjective: Pt. states it felt better to stand up and walk.          Social/Functional History  Social/Functional History  Lives With: Spouse  Type of Home: House  Home Layout: One level  Home Access: Ramped entrance  [address] Help From: Family  ADL Assistance: Independent  Ambulation Assistance: Independent  Transfer Assistance: Independent  Active : Yes  Occupation: Retired  Type of Occupation:  and dispatcher in [employer]  Additional Comments: pt's wife getting BSC from [place] [id number] and going to get RW from [facility]  [address]: Within Functional Limits  Hearing  Hearing: Within functional limits    Cognition
dry no evidence of infection. , No Drainage and Wound Intact   Neurovascular: Exam normal     DVT Exam: No evidence of DVT seen on physical exam.   Xray:  left Knee implants in good position        Data Review:  Recent Labs     06/01/23 0149   HGB 11.4*     Recent Labs     06/01/23 0149      K 4.3   CREATININE 0.9     Recent Labs     06/01/23 0149   LABGLOM >60     No results for input(s): INR in the last 72 hours. Assessment:     Status Post left TKA. Plan:     Pt will continue with his current nursing cares and therapy as tolerated. Plan for home tomorrow with home health if stable.       Electronically signed by Leslye Schrader PA-C on 6/1/2023 at 7:15 AM
sensation   Assessment Patient able to ambulate into bathroom and in room this afternoon with noted increased pain with activity. He had no LOB and seemed steady overall. He was positioned in his recliner with all needs in reach. Will continue to follow and progress as able. Activity Tolerance   Activity Tolerance Patient tolerated treatment well;Patient limited by pain   PT Plan of Care   Friday X   Safety Devices   Type of Devices Call light within reach; Chair alarm in place;Gait belt;Left in chair       Electronically signed by Shon Jade PTA on 6/2/2023 at 3:34 PM

## 2023-06-03 NOTE — DISCHARGE SUMMARY
[facility]  Dr. Eugenia Arroyo  Discharge Summary     Chen Espana is a 79 y.o. male underwent left total knee replacement procedure without complication. Chen Espana was admitted to the floor following his   recovery in the PACU.      Discharge Diagnosis   Left Knee Replacement    Current Inpatient Medications    Current Facility-Administered Medications: glucose chewable tablet 16 g, 4 tablet, Oral, PRN  dextrose bolus 10% 125 mL, 125 mL, IntraVENous, PRN **OR** dextrose bolus 10% 250 mL, 250 mL, IntraVENous, PRN  glucagon (rDNA) injection 1 mg, 1 mg, SubCUTAneous, PRN  dextrose 10 % infusion, , IntraVENous, Continuous PRN  insulin lispro (HUMALOG) injection vial 0-4 Units, 0-4 Units, SubCUTAneous, TID WC  insulin lispro (HUMALOG) injection vial 0-4 Units, 0-4 Units, SubCUTAneous, Nightly  aspirin EC tablet 81 mg, 81 mg, Oral, Daily  metFORMIN (GLUCOPHAGE) tablet 1,000 mg, 1,000 mg, Oral, BID WC  atorvastatin (LIPITOR) tablet 40 mg, 40 mg, Oral, Daily  propafenone (RYTHMOL) tablet 300 mg, 300 mg, Oral, Q8H  empagliflozin (JARDIANCE) tablet 25 mg, 25 mg, Oral, Daily  apixaban (ELIQUIS) tablet 5 mg, 5 mg, Oral, BID  0.9 % sodium chloride infusion, , IntraVENous, Continuous  0.9 % sodium chloride bolus, 500 mL, IntraVENous, PRN  sodium chloride flush 0.9 % injection 5-40 mL, 5-40 mL, IntraVENous, 2 times per day  sodium chloride flush 0.9 % injection 5-40 mL, 5-40 mL, IntraVENous, PRN  0.9 % sodium chloride infusion, , IntraVENous, PRN  acetaminophen (TYLENOL) tablet 650 mg, 650 mg, Oral, Q6H  bisacodyl (DULCOLAX) EC tablet 5 mg, 5 mg, Oral, Daily  ondansetron (ZOFRAN-ODT) disintegrating tablet 4 mg, 4 mg, Oral, Q8H PRN **OR** ondansetron (ZOFRAN) injection 4 mg, 4 mg, IntraVENous, Q6H PRN  oxyCODONE (ROXICODONE) immediate release tablet 5 mg, 5 mg, Oral, Q4H PRN **OR** oxyCODONE (ROXICODONE) immediate release tablet 10 mg, 10 mg, Oral, Q4H PRN **OR** oxyCODONE (ROXICODONE) immediate

## 2023-06-03 NOTE — OUTREACH NOTE
Prep Survey      Flowsheet Row Responses   [facility] facility patient discharged from? Non-BH   Is LACE score < 7 ? Non-BH Discharge   Eligibility Not Eligible   What are the reasons patient is not eligible? Other   Does the patient have one of the following disease processes/diagnoses(primary or secondary)? Other   Prep survey completed? Yes            Franca OCONNOR - Registered Nurse

## 2023-08-10 ENCOUNTER — HOSPITAL ENCOUNTER (OUTPATIENT)
Dept: GENERAL RADIOLOGY | Facility: HOSPITAL | Age: 68
Discharge: HOME | End: 2023-08-10
Payer: MEDICARE

## 2023-08-10 ENCOUNTER — OFFICE VISIT (OUTPATIENT)
Dept: VASCULAR SURGERY | Facility: CLINIC | Age: 68
End: 2023-08-10
Payer: MEDICARE

## 2023-08-10 ENCOUNTER — PRE-ADMISSION TESTING (OUTPATIENT)
Dept: PREADMISSION TESTING | Facility: HOSPITAL | Age: 68
DRG: 455 | End: 2023-08-10
Payer: MEDICARE

## 2023-08-10 VITALS
DIASTOLIC BLOOD PRESSURE: 72 MMHG | WEIGHT: 268 LBS | HEART RATE: 80 BPM | OXYGEN SATURATION: 96 % | BODY MASS INDEX: 35.52 KG/M2 | SYSTOLIC BLOOD PRESSURE: 148 MMHG | HEIGHT: 73 IN

## 2023-08-10 VITALS
OXYGEN SATURATION: 94 % | SYSTOLIC BLOOD PRESSURE: 149 MMHG | RESPIRATION RATE: 18 BRPM | DIASTOLIC BLOOD PRESSURE: 73 MMHG | BODY MASS INDEX: 35.53 KG/M2 | HEIGHT: 73 IN | WEIGHT: 268.08 LBS | HEART RATE: 79 BPM

## 2023-08-10 DIAGNOSIS — G89.29 CHRONIC MIDLINE LOW BACK PAIN, UNSPECIFIED WHETHER SCIATICA PRESENT: Primary | ICD-10-CM

## 2023-08-10 DIAGNOSIS — M54.50 CHRONIC MIDLINE LOW BACK PAIN, UNSPECIFIED WHETHER SCIATICA PRESENT: Primary | ICD-10-CM

## 2023-08-10 LAB
ABO GROUP BLD: NORMAL
ALBUMIN SERPL-MCNC: 4.1 G/DL (ref 3.5–5.2)
ALBUMIN/GLOB SERPL: 1.7 G/DL
ALP SERPL-CCNC: 111 U/L (ref 39–117)
ALT SERPL W P-5'-P-CCNC: 13 U/L (ref 1–41)
ANION GAP SERPL CALCULATED.3IONS-SCNC: 10 MMOL/L (ref 5–15)
APTT PPP: 31.2 SECONDS (ref 24.1–35)
AST SERPL-CCNC: 10 U/L (ref 1–40)
BILIRUB SERPL-MCNC: 0.5 MG/DL (ref 0–1.2)
BILIRUB UR QL STRIP: NEGATIVE
BLD GP AB SCN SERPL QL: NEGATIVE
BUN SERPL-MCNC: 18 MG/DL (ref 8–23)
BUN/CREAT SERPL: 23.4 (ref 7–25)
CALCIUM SPEC-SCNC: 9.1 MG/DL (ref 8.6–10.5)
CHLORIDE SERPL-SCNC: 108 MMOL/L (ref 98–107)
CLARITY UR: CLEAR
CO2 SERPL-SCNC: 26 MMOL/L (ref 22–29)
COLOR UR: YELLOW
CREAT SERPL-MCNC: 0.77 MG/DL (ref 0.76–1.27)
DEPRECATED RDW RBC AUTO: 46.7 FL (ref 37–54)
EGFRCR SERPLBLD CKD-EPI 2021: 97.5 ML/MIN/1.73
ERYTHROCYTE [DISTWIDTH] IN BLOOD BY AUTOMATED COUNT: 15.6 % (ref 12.3–15.4)
GLOBULIN UR ELPH-MCNC: 2.4 GM/DL
GLUCOSE SERPL-MCNC: 133 MG/DL (ref 65–99)
GLUCOSE UR STRIP-MCNC: ABNORMAL MG/DL
HCT VFR BLD AUTO: 46.9 % (ref 37.5–51)
HGB BLD-MCNC: 13.5 G/DL (ref 13–17.7)
HGB UR QL STRIP.AUTO: NEGATIVE
INR PPP: 0.98 (ref 0.91–1.09)
KETONES UR QL STRIP: NEGATIVE
LEUKOCYTE ESTERASE UR QL STRIP.AUTO: NEGATIVE
MCH RBC QN AUTO: 23.9 PG (ref 26.6–33)
MCHC RBC AUTO-ENTMCNC: 28.8 G/DL (ref 31.5–35.7)
MCV RBC AUTO: 83.2 FL (ref 79–97)
NITRITE UR QL STRIP: NEGATIVE
PH UR STRIP.AUTO: <=5 [PH] (ref 5–8)
PLATELET # BLD AUTO: 149 10*3/MM3 (ref 140–450)
PMV BLD AUTO: 11.8 FL (ref 6–12)
POTASSIUM SERPL-SCNC: 4.1 MMOL/L (ref 3.5–5.2)
PROT SERPL-MCNC: 6.5 G/DL (ref 6–8.5)
PROT UR QL STRIP: NEGATIVE
PROTHROMBIN TIME: 13.1 SECONDS (ref 11.8–14.8)
RBC # BLD AUTO: 5.64 10*6/MM3 (ref 4.14–5.8)
RH BLD: POSITIVE
SODIUM SERPL-SCNC: 144 MMOL/L (ref 136–145)
SP GR UR STRIP: 1.02 (ref 1–1.03)
T&S EXPIRATION DATE: NORMAL
UROBILINOGEN UR QL STRIP: ABNORMAL
WBC NRBC COR # BLD: 8.28 10*3/MM3 (ref 3.4–10.8)

## 2023-08-10 PROCEDURE — 93010 ELECTROCARDIOGRAM REPORT: CPT | Performed by: INTERNAL MEDICINE

## 2023-08-10 PROCEDURE — 1160F RVW MEDS BY RX/DR IN RCRD: CPT | Performed by: NURSE PRACTITIONER

## 2023-08-10 PROCEDURE — 86900 BLOOD TYPING SEROLOGIC ABO: CPT

## 2023-08-10 PROCEDURE — 93005 ELECTROCARDIOGRAM TRACING: CPT

## 2023-08-10 PROCEDURE — 99203 OFFICE O/P NEW LOW 30 MIN: CPT | Performed by: NURSE PRACTITIONER

## 2023-08-10 PROCEDURE — 1159F MED LIST DOCD IN RCRD: CPT | Performed by: NURSE PRACTITIONER

## 2023-08-10 PROCEDURE — 3078F DIAST BP <80 MM HG: CPT | Performed by: NURSE PRACTITIONER

## 2023-08-10 PROCEDURE — 3077F SYST BP >= 140 MM HG: CPT | Performed by: NURSE PRACTITIONER

## 2023-08-10 PROCEDURE — 36415 COLL VENOUS BLD VENIPUNCTURE: CPT

## 2023-08-10 PROCEDURE — 71045 X-RAY EXAM CHEST 1 VIEW: CPT

## 2023-08-10 PROCEDURE — 85730 THROMBOPLASTIN TIME PARTIAL: CPT

## 2023-08-10 PROCEDURE — 81003 URINALYSIS AUTO W/O SCOPE: CPT

## 2023-08-10 PROCEDURE — 86901 BLOOD TYPING SEROLOGIC RH(D): CPT

## 2023-08-10 PROCEDURE — 80053 COMPREHEN METABOLIC PANEL: CPT

## 2023-08-10 PROCEDURE — 85610 PROTHROMBIN TIME: CPT

## 2023-08-10 PROCEDURE — 85027 COMPLETE CBC AUTOMATED: CPT

## 2023-08-10 PROCEDURE — 86850 RBC ANTIBODY SCREEN: CPT

## 2023-08-10 RX ORDER — OXYCODONE HYDROCHLORIDE AND ACETAMINOPHEN 5; 325 MG/1; MG/1
1 TABLET ORAL EVERY 12 HOURS PRN
COMMUNITY
Start: 2023-07-25 | End: 2023-08-15 | Stop reason: HOSPADM

## 2023-08-10 NOTE — DISCHARGE INSTRUCTIONS
Before you come to the hospital        Arrival time: AS DIRECTED BY OFFICE     YOU MAY TAKE THE FOLLOWING MEDICATION(S) THE MORNING OF SURGERY WITH A SIP OF WATER: ONLY WHAT YOUR PROVIDER HAS INSTRUCTED YOU TO TAKE    DO NOT TAKE OLMESARTAN (BENICAR) FOR 24 HOURS PRIOR TO SURGERY           ALL OTHER HOME MEDICATION CHECK WITH YOUR PHYSICIAN (especially if   you are taking diabetes medicines or blood thinners)    Do not take any Erectile Dysfunction medications (EX: CIALIS, VIAGRA) 24 hours prior to surgery.      If you were given and instructed to use a germ- killing soap, use as directed the night before surgery and again the morning of surgery or as directed by your surgeon. (Use one-half of the bottle with each shower.)   See attached information for How to Use Chlorhexidine for Bathing if applicable.            Eating and drinking restrictions prior to scheduled arrival time    2 Hours before arrival time STOP   Drinking Clear liquids (water, black coffee-NO CREAM,  apple juice-no pulp)      8 Hours before arrival time STOP   All food, full liquids, and dairy products    (It is extremely important that you follow these guidelines to prevent delay or cancelation of your procedure)     Clear Liquids  Water and flavored water                                                                      Clear Fruit juices, such as cranberry juice and apple juice.  Black coffee (NO cream of any kind, including powdered).  Plain tea  Clear bouillon or broth.  Flavored gelatin.  Soda.  Gatorade or Powerade.  Full liquid examples  Juices that have pulp.  Frozen ice pops that contain fruit pieces.  Coffee with creamer  Milk.  Yogurt.                MANAGING PAIN AFTER SURGERY    We know you are probably wondering what your pain will be like after surgery.  Following surgery it is unrealistic to expect you will not have pain.   Pain is how our bodies let us know that something is wrong or cautions us to be careful.  That said, our  goal is to make your pain tolerable.    Methods we may use to treat your pain include (oral or IV medications, PCAs, epidurals, nerve blocks, etc.)   While some procedures require IV pain medications for a short time after surgery, transitioning to pain medications by mouth allows for better management of pain.   Your nurse will encourage you to take oral pain medications whenever possible.  IV medications work almost immediately, but only last a short while.  Taking medications by mouth allows for a more constant level of medication in your blood stream for a longer period of time.      Once your pain is out of control it is harder to get back under control.  It is important you are aware when your next dose of pain medication is due.  If you are admitted, your nurse may write the time of your next dose on the white board in your room to help you remember.      We are interested in your pain and encourage you to inform us about aggravating factors during your visit.   Many times a simple repositioning every few hours can make a big difference.    If your physician says it is okay, do not let your pain prevent you from getting out of bed. Be sure to call your nurse for assistance prior to getting up so you do not fall.      Before surgery, please decide your tolerable pain goal.  These faces help describe the pain ratings we use on a 0-10 scale.   Be prepared to tell us your goal and whether or not you take pain or anxiety medications at home.          Preparing for Surgery  Preparing for surgery is an important part of your care. It can make things go more smoothly and help you avoid complications. The steps leading up to surgery may vary among hospitals. Follow all instructions given to you by your health care providers. Ask questions if you do not understand something. Talk about any concerns that you have.  Here are some questions to consider asking before your surgery:  If my surgery is not an emergency (is  elective), when would be the best time to have the surgery?  What arrangements do I need to make for work, home, or school?  What will my recovery be like? How long will it be before I can return to normal activities?  Will I need to prepare my home? Will I need to arrange care for me or my children?  Should I expect to have pain after surgery? What are my pain management options? Are there nonmedical options that I can try for pain?  Tell a health care provider about:  Any allergies you have.  All medicines you are taking, including vitamins, herbs, eye drops, creams, and over-the-counter medicines.  Any problems you or family members have had with anesthetic medicines.  Any blood disorders you have.  Any surgeries you have had.  Any medical conditions you have.  Whether you are pregnant or may be pregnant.  What are the risks?  The risks and complications of surgery depend on the specific procedure that you have. Discuss all the risks with your health care providers before your surgery. Ask about common surgical complications, which may include:  Infection.  Bleeding or a need for blood replacement (transfusion).  Allergic reactions to medicines.  Damage to surrounding nerves, tissues, or structures.  A blood clot.  Scarring.  Failure of the surgery to correct the problem.  Follow these instructions before the procedure:  Several days or weeks before your procedure  You may have a physical exam by your primary health care provider to make sure it is safe for you to have surgery.  You may have testing. This may include a chest X-ray, blood and urine tests, electrocardiogram (ECG), or other testing.  Ask your health care provider about:  Changing or stopping your regular medicines. This is especially important if you are taking diabetes medicines or blood thinners.  Taking medicines such as aspirin and ibuprofen. These medicines can thin your blood. Do not take these medicines unless your health care provider tells  you to take them.  Taking over-the-counter medicines, vitamins, herbs, and supplements.  Do not use any products that contain nicotine or tobacco, such as cigarettes and e-cigarettes. If you need help quitting, ask your health care provider.  Avoid alcohol.  Ask your health care provider if there are exercises you can do to prepare for surgery.  Eat a healthy diet.   Plan to have someone 18 years of age or older to take you home from the hospital. We will need to verify your ride on the morning of surgery if you are being discharged home on the same day. Tell your ride to be expecting a call from the hospital prior to your procedure.   Plan to have a responsible adult care for you for at least 24 hours after you leave the hospital or clinic. This is important.  The day before your procedure  You may be given antibiotic medicine to take by mouth to help prevent infection. Take it as told by your health care provider.  You may be asked to shower with a germ-killing soap.  Follow instructions from your health care provider about eating and drinking restrictions. This includes gum, mints and hard candy.  Pack comfortable clothes according to your procedure.   The day of your procedure  You may need to take another shower with a germ-killing soap before you leave home in the morning.  With a small sip of water, take only the medicines that you are told to take.  Remove all jewelry including rings.   Leave anything you consider valuable at home except hearing aids if needed.  You do not need to bring your home medications into the hospital.   Do not wear any makeup, nail polish, powder, deodorant, lotion, hair accessories, or anything on your skin or body except your clothes.  If you will be staying in the hospital, bring a case to hold your glasses, contacts, or dentures. You may also want to bring your robe and non-skid footwear.       (Do not use denture adhesives since you will be asked to remove them during   surgery).   If you wear oxygen at home, bring it with you the day of surgery.  If instructed by your health care provider, bring your sleep apnea device with you on the day of your surgery (if this applies to you).  You may want to leave your suitcase and sleep apnea device in the car until after surgery.   Arrive at the hospital as scheduled.  Bring a friend or family member with you who can help to answer questions and be present while you meet with your health care provider.  At the hospital  When you arrive at the hospital:  Go to registration located at the main entrance of the hospital. You will be registered and given a beeper and a sticker sheet. Take the stickers to the Outpatient nurses desk and place in the black tray. This is to notify staff that you have arrived. Then return to the lobby to wait.   When your beeper lights up and vibrates proceed through the double doors, under the stairs, and a member of the Outpatient Surgery staff will escort you to your preoperative room.  You may have to wear compression sleeves. These help to prevent blood clots and reduce swelling in your legs.  An IV may be inserted into one of your veins.              In the operating room, you may be given one or more of the following:        A medicine to help you relax (sedative).        A medicine to numb the area (local anesthetic).        A medicine to make you fall asleep (general anesthetic).        A medicine that is injected into an area of your body to numb everything below the                      injection site (regional anesthetic).  You may be given an antibiotic through your IV to help prevent infection.  Your surgical site will be marked or identified.    Contact a health care provider if you:  Develop a fever of more than 100.4øF (38øC) or other feelings of illness during the 48 hours before your surgery.  Have symptoms that get worse.  Have questions or concerns about your surgery.  Summary  Preparing for  surgery can make the procedure go more smoothly and lower your risk of complications.  Before surgery, make a list of questions and concerns to discuss with your surgeon. Ask about the risks and possible complications.  In the days or weeks before your surgery, follow all instructions from your health care provider. You may need to stop smoking, avoid alcohol, follow eating restrictions, and change or stop your regular medicines.  Contact your surgeon if you develop a fever or other signs of illness during the few days before your surgery.  This information is not intended to replace advice given to you by your health care provider. Make sure you discuss any questions you have with your health care provider.  Document Revised: 12/21/2018 Document Reviewed: 10/23/2018  [employer] Patient Education c 2021 [employer]

## 2023-08-10 NOTE — PROGRESS NOTES
08/10/2023      GRETCHEN Devi MD  [address] [place] [id number]    Bella Ordaz  1955    Chief Complaint   Patient presents with    MARIA E Devi 23.       Dear GRETCHEN Devi MD:      HPI  I had the pleasure of seeing your patient Bella Ordaz in the office today.  Thank you kindly for this consultation.  As you recall, Bella Ordaz is a 68 y.o.  male who you are currently following for chronic back pain.  Bella Ordazis scheduled for anterior lumbar interbody fusion of L5-S1 with Dr. Devi on 2023.  The patient denies any history of DVT, but have a history PE.      Past Medical History:   Diagnosis Date    Arthritis     Chronic left-sided low back pain     radiates down left leg    Diabetes mellitus     Facet arthropathy, lumbar     Hypertension     Pulmonary embolism 10/2022    pt states 2-3 blood clots in the lung    Sleep apnea     pt states doesn't really use his cpap machine       Past Surgical History:   Procedure Laterality Date    APPENDECTOMY      as a kid    CYST REMOVAL      tailbone    HAND SURGERY Left     nerve/tendon repair    KNEE ARTHROSCOPY Bilateral     x2 left, x1 right    LAPAROSCOPIC CHOLECYSTECTOMY      REPLACEMENT TOTAL KNEE Left 2023    SPINAL CORD DECOMPRESSION         Family History   Problem Relation Age of Onset    Heart failure Mother     Heart failure Father        Social History     Socioeconomic History    Marital status:    Tobacco Use    Smoking status: Former     Packs/day: 2.00     Years: 5.00     Pack years: 10.00     Types: Cigarettes     Quit date:      Years since quittin.6    Smokeless tobacco: Never   Vaping Use    Vaping Use: Never used   Substance and Sexual Activity    Alcohol use: Not Currently    Drug use: Not Currently    Sexual activity: Defer       No Known Allergies    Current Outpatient Medications   Medication Instructions    apixaban (ELIQUIS) 5 MG tablet tablet 1 tablet, Oral, 2 Times Daily     "atorvastatin (LIPITOR) 40 MG tablet Take 1 tablet by mouth Daily.    empagliflozin (JARDIANCE) 25 mg, Oral, Daily    metFORMIN (GLUCOPHAGE) 500 MG tablet Take 2 tablets by mouth 2 (Two) Times a Day With Meals.    olmesartan-hydrochlorothiazide (BENICAR HCT) 40-12.5 MG per tablet 1 tablet, Oral, Daily    oxyCODONE-acetaminophen (PERCOCET) 7.5-325 MG per tablet 1 tablet, Oral, Every 12 Hours PRN    propafenone (RYTHMOL) 300 MG tablet Take 1 tablet by mouth Every 8 (Eight) Hours.         Review of Systems   Constitutional: Negative.    HENT: Negative.     Eyes: Negative.    Respiratory: Negative.     Cardiovascular: Negative.    Gastrointestinal: Negative.    Endocrine: Negative.    Genitourinary: Negative.    Musculoskeletal:  Positive for arthralgias, back pain and gait problem.   Skin: Negative.    Allergic/Immunologic: Negative.    Hematological: Negative.    Psychiatric/Behavioral: Negative.     All other systems reviewed and are negative.    /72   Pulse 80   Ht 185 cm (72.84\")   Wt 122 kg (268 lb)   SpO2 96%   BMI 35.51 kg/mý   Physical Exam  Vitals and nursing note reviewed.   Constitutional:       Appearance: Normal appearance. He is well-developed. He is obese.   HENT:      Head: Normocephalic and atraumatic.   Eyes:      General: No scleral icterus.     Pupils: Pupils are equal, round, and reactive to light.   Neck:      Thyroid: No thyromegaly.   Cardiovascular:      Rate and Rhythm: Normal rate and regular rhythm.      Heart sounds: Normal heart sounds.   Pulmonary:      Effort: Pulmonary effort is normal.      Breath sounds: Normal breath sounds.   Abdominal:      General: Bowel sounds are normal.      Palpations: Abdomen is soft.   Musculoskeletal:         General: Normal range of motion.      Cervical back: Normal range of motion and neck supple.      Comments: Lumbar pain   Skin:     General: Skin is warm and dry.   Neurological:      Mental Status: He is alert and oriented to person, place, " and time.   Psychiatric:         Behavior: Behavior normal.         Thought Content: Thought content normal.         Judgment: Judgment normal.       XR Chest 1 View    Result Date: 8/10/2023  Narrative: EXAM/TECHNIQUE: XR CHEST 1 VW-  INDICATION: Anterior lumbar fusion  COMPARISON: None  FINDINGS:  Cardiac silhouette is normal size. No pleural effusion, pneumothorax, or focal consolidation. No acute osseous finding.      Impression:  No acute findings. This report was finalized on 08/10/2023 09:50 by Dr. Mayito Hook MD.     Patient Active Problem List   Diagnosis    Primary hypertension    Paroxysmal atrial fibrillation    Type 2 diabetes mellitus without complication, without long-term current use of insulin    Mixed hyperlipidemia         ICD-10-CM ICD-9-CM   1. Chronic midline low back pain, unspecified whether sciatica present  M54.50 724.2    G89.29 338.29         Plan: After thoroughly evaluating Bella Ordaz, I believe the best course of action is to proceed with anterior lumbar interbody fusion of L5-S1.  Risks of ALIF were discussed and include, but are not limited to, bleeding, infection, nerve damage, vessel damage, retrograde ejaculation, bowel damage, ureteral damage, DVT, MI, stroke, and death.  The patient understands these risks and wishes to proceed with procedure.  The patient can continue taking their current medication regimen as previously planned.  This was all discussed in full with complete understanding.    Thank you for allowing me to participate in the care of your patient.  Please do not hesitate with any questions or concerns.  I will keep you aware of any further encounters with Bella Ordaz.        Sincerely yours,         SANDOR Bhandari

## 2023-08-10 NOTE — LETTER
August 10, 2023     Geovanny Olivarez MD  [address] [facility] [place] [address]    Patient: Bella Ordaz   YOB: 1955   Date of Visit: 8/10/2023     Dear Geovanny Olivarez MD:       Thank you for referring Bella Ordaz to me for evaluation. Below are the relevant portions of my assessment and plan of care.    If you have questions, please do not hesitate to call me. I look forward to following Bella along with you.         Sincerely,        SANDOR Bhandari        CC: No Recipients    Macy Narvaez APRN  08/10/23 2044  Sign when Signing Visit  08/10/2023      GRETCHEN Devi MD  [address] [place] [address]    Bella Ordaz  1955    Chief Complaint   Patient presents with    MARIA E Devi 8/11/23.       Dear GRETCHEN Devi MD:      HPI  I had the pleasure of seeing your patient Bella Ordaz in the office today.  Thank you kindly for this consultation.  As you recall, Bella Ordaz is a 68 y.o.  male who you are currently following for chronic back pain.  Bella Ordazis scheduled for anterior lumbar interbody fusion of L5-S1 with Dr. Devi on 8/11/2023.  The patient denies any history of DVT, but have a history PE.      Past Medical History:   Diagnosis Date    Arthritis     Chronic left-sided low back pain     radiates down left leg    Diabetes mellitus     Facet arthropathy, lumbar     Hypertension     Pulmonary embolism 10/2022    pt states 2-3 blood clots in the lung    Sleep apnea     pt states doesn't really use his cpap machine       Past Surgical History:   Procedure Laterality Date    APPENDECTOMY      as a kid    CYST REMOVAL      tailbone    HAND SURGERY Left     nerve/tendon repair    KNEE ARTHROSCOPY Bilateral     x2 left, x1 right    LAPAROSCOPIC CHOLECYSTECTOMY      REPLACEMENT TOTAL KNEE Left 05/2023    SPINAL CORD DECOMPRESSION  2013       Family History   Problem Relation Age of Onset    Heart failure Mother     Heart  "failure Father        Social History     Socioeconomic History    Marital status:    Tobacco Use    Smoking status: Former     Packs/day: 2.00     Years: 5.00     Pack years: 10.00     Types: Cigarettes     Quit date:      Years since quittin.6    Smokeless tobacco: Never   Vaping Use    Vaping Use: Never used   Substance and Sexual Activity    Alcohol use: Not Currently    Drug use: Not Currently    Sexual activity: Defer       No Known Allergies    Current Outpatient Medications   Medication Instructions    apixaban (ELIQUIS) 5 MG tablet tablet 1 tablet, Oral, 2 Times Daily    atorvastatin (LIPITOR) 40 MG tablet Take 1 tablet by mouth Daily.    empagliflozin (JARDIANCE) 25 mg, Oral, Daily    metFORMIN (GLUCOPHAGE) 500 MG tablet Take 2 tablets by mouth 2 (Two) Times a Day With Meals.    olmesartan-hydrochlorothiazide (BENICAR HCT) 40-12.5 MG per tablet 1 tablet, Oral, Daily    oxyCODONE-acetaminophen (PERCOCET) 7.5-325 MG per tablet 1 tablet, Oral, Every 12 Hours PRN    propafenone (RYTHMOL) 300 MG tablet Take 1 tablet by mouth Every 8 (Eight) Hours.         Review of Systems   Constitutional: Negative.    HENT: Negative.     Eyes: Negative.    Respiratory: Negative.     Cardiovascular: Negative.    Gastrointestinal: Negative.    Endocrine: Negative.    Genitourinary: Negative.    Musculoskeletal:  Positive for arthralgias, back pain and gait problem.   Skin: Negative.    Allergic/Immunologic: Negative.    Hematological: Negative.    Psychiatric/Behavioral: Negative.     All other systems reviewed and are negative.    /72   Pulse 80   Ht 185 cm (72.84\")   Wt 122 kg (268 lb)   SpO2 96%   BMI 35.51 kg/mý   Physical Exam  Vitals and nursing note reviewed.   Constitutional:       Appearance: Normal appearance. He is well-developed. He is obese.   HENT:      Head: Normocephalic and atraumatic.   Eyes:      General: No scleral icterus.     Pupils: Pupils are equal, round, and " reactive to light.   Neck:      Thyroid: No thyromegaly.   Cardiovascular:      Rate and Rhythm: Normal rate and regular rhythm.      Heart sounds: Normal heart sounds.   Pulmonary:      Effort: Pulmonary effort is normal.      Breath sounds: Normal breath sounds.   Abdominal:      General: Bowel sounds are normal.      Palpations: Abdomen is soft.   Musculoskeletal:         General: Normal range of motion.      Cervical back: Normal range of motion and neck supple.      Comments: Lumbar pain   Skin:     General: Skin is warm and dry.   Neurological:      Mental Status: He is alert and oriented to person, place, and time.   Psychiatric:         Behavior: Behavior normal.         Thought Content: Thought content normal.         Judgment: Judgment normal.       XR Chest 1 View    Result Date: 8/10/2023  Narrative: EXAM/TECHNIQUE: XR CHEST 1 VW-  INDICATION: Anterior lumbar fusion  COMPARISON: None  FINDINGS:  Cardiac silhouette is normal size. No pleural effusion, pneumothorax, or focal consolidation. No acute osseous finding.      Impression:  No acute findings. This report was finalized on 08/10/2023 09:50 by Dr. Mayito Hook MD.     Patient Active Problem List   Diagnosis    Primary hypertension    Paroxysmal atrial fibrillation    Type 2 diabetes mellitus without complication, without long-term current use of insulin    Mixed hyperlipidemia         ICD-10-CM ICD-9-CM   1. Chronic midline low back pain, unspecified whether sciatica present  M54.50 724.2    G89.29 338.29         Plan: After thoroughly evaluating Bella ALCIDES Ordaz, I believe the best course of action is to proceed with anterior lumbar interbody fusion of L5-S1.  Risks of ALIF were discussed and include, but are not limited to, bleeding, infection, nerve damage, vessel damage, retrograde ejaculation, bowel damage, ureteral damage, DVT, MI, stroke, and death.  The patient understands these risks and wishes to proceed with procedure.  The patient can  continue taking their current medication regimen as previously planned.  This was all discussed in full with complete understanding.    Thank you for allowing me to participate in the care of your patient.  Please do not hesitate with any questions or concerns.  I will keep you aware of any further encounters with Bella Ordaz.        Sincerely yours,         SANDOR Bhandari

## 2023-08-11 ENCOUNTER — ANESTHESIA EVENT (OUTPATIENT)
Dept: PERIOP | Facility: HOSPITAL | Age: 68
DRG: 455 | End: 2023-08-11
Payer: MEDICARE

## 2023-08-11 ENCOUNTER — APPOINTMENT (OUTPATIENT)
Dept: GENERAL RADIOLOGY | Facility: HOSPITAL | Age: 68
DRG: 455 | End: 2023-08-11
Payer: MEDICARE

## 2023-08-11 ENCOUNTER — ANESTHESIA (OUTPATIENT)
Dept: PERIOP | Facility: HOSPITAL | Age: 68
DRG: 455 | End: 2023-08-11
Payer: MEDICARE

## 2023-08-11 ENCOUNTER — HOSPITAL ENCOUNTER (INPATIENT)
Facility: HOSPITAL | Age: 68
LOS: 4 days | Discharge: HOME | DRG: 455 | End: 2023-08-15
Attending: ORTHOPAEDIC SURGERY | Admitting: ORTHOPAEDIC SURGERY
Payer: MEDICARE

## 2023-08-11 DIAGNOSIS — Z74.09 IMPAIRED MOBILITY: Primary | ICD-10-CM

## 2023-08-11 DIAGNOSIS — M54.16 LUMBAR RADICULOPATHY: ICD-10-CM

## 2023-08-11 PROBLEM — M54.40 LUMBAGO OF MULTIPLE SITES IN SPINE WITH SCIATICA: Status: ACTIVE | Noted: 2023-08-11

## 2023-08-11 PROBLEM — M51.9 LUMBOSACRAL DISC DISEASE: Status: ACTIVE | Noted: 2023-08-11

## 2023-08-11 PROBLEM — M54.42 CHRONIC BILATERAL LOW BACK PAIN WITH BILATERAL SCIATICA: Status: ACTIVE | Noted: 2023-08-11

## 2023-08-11 PROBLEM — G89.29 CHRONIC BILATERAL LOW BACK PAIN WITH BILATERAL SCIATICA: Status: ACTIVE | Noted: 2023-08-11

## 2023-08-11 PROBLEM — M54.41 CHRONIC BILATERAL LOW BACK PAIN WITH BILATERAL SCIATICA: Status: ACTIVE | Noted: 2023-08-11

## 2023-08-11 LAB
GLUCOSE BLDC GLUCOMTR-MCNC: 166 MG/DL (ref 70–130)
GLUCOSE BLDC GLUCOMTR-MCNC: 196 MG/DL (ref 70–130)

## 2023-08-11 PROCEDURE — 82948 REAGENT STRIP/BLOOD GLUCOSE: CPT

## 2023-08-11 PROCEDURE — 25010000002 DROPERIDOL PER 5 MG: Performed by: ANESTHESIOLOGY

## 2023-08-11 PROCEDURE — 0ST40ZZ RESECTION OF LUMBOSACRAL DISC, OPEN APPROACH: ICD-10-PCS | Performed by: ORTHOPAEDIC SURGERY

## 2023-08-11 PROCEDURE — 4A11X4G MONITORING OF PERIPHERAL NERVOUS ELECTRICAL ACTIVITY, INTRAOPERATIVE, EXTERNAL APPROACH: ICD-10-PCS | Performed by: ORTHOPAEDIC SURGERY

## 2023-08-11 PROCEDURE — C1713 ANCHOR/SCREW BN/BN,TIS/BN: HCPCS | Performed by: ORTHOPAEDIC SURGERY

## 2023-08-11 PROCEDURE — 25010000002 SUGAMMADEX 200 MG/2ML SOLUTION: Performed by: NURSE ANESTHETIST, CERTIFIED REGISTERED

## 2023-08-11 PROCEDURE — 01NR0ZZ RELEASE SACRAL NERVE, OPEN APPROACH: ICD-10-PCS | Performed by: ORTHOPAEDIC SURGERY

## 2023-08-11 PROCEDURE — 25010000002 FLUCONAZOLE PER 200 MG: Performed by: NURSE ANESTHETIST, CERTIFIED REGISTERED

## 2023-08-11 PROCEDURE — 22558 ARTHRD ANT NTRBD MIN DSC LUM: CPT | Performed by: SURGERY

## 2023-08-11 PROCEDURE — 25010000002 PROPOFOL 10 MG/ML EMULSION: Performed by: NURSE ANESTHETIST, CERTIFIED REGISTERED

## 2023-08-11 PROCEDURE — 25010000002 FENTANYL CITRATE (PF) 50 MCG/ML SOLUTION: Performed by: NURSE ANESTHETIST, CERTIFIED REGISTERED

## 2023-08-11 PROCEDURE — 74018 RADEX ABDOMEN 1 VIEW: CPT

## 2023-08-11 PROCEDURE — 76000 FLUOROSCOPY <1 HR PHYS/QHP: CPT

## 2023-08-11 PROCEDURE — 97535 SELF CARE MNGMENT TRAINING: CPT

## 2023-08-11 PROCEDURE — 25010000002 CEFAZOLIN PER 500 MG: Performed by: ORTHOPAEDIC SURGERY

## 2023-08-11 PROCEDURE — 25010000002 ONDANSETRON PER 1 MG: Performed by: NURSE ANESTHETIST, CERTIFIED REGISTERED

## 2023-08-11 PROCEDURE — 97607 NEG PRS WND THR NDME<=50SQCM: CPT | Performed by: SURGERY

## 2023-08-11 PROCEDURE — 72100 X-RAY EXAM L-S SPINE 2/3 VWS: CPT

## 2023-08-11 PROCEDURE — 25010000002 DEXAMETHASONE PER 1 MG: Performed by: NURSE ANESTHETIST, CERTIFIED REGISTERED

## 2023-08-11 PROCEDURE — 25010000002 HYDROMORPHONE 1 MG/ML SOLUTION: Performed by: NURSE ANESTHETIST, CERTIFIED REGISTERED

## 2023-08-11 PROCEDURE — 01NB0ZZ RELEASE LUMBAR NERVE, OPEN APPROACH: ICD-10-PCS | Performed by: ORTHOPAEDIC SURGERY

## 2023-08-11 PROCEDURE — 97165 OT EVAL LOW COMPLEX 30 MIN: CPT

## 2023-08-11 PROCEDURE — 97161 PT EVAL LOW COMPLEX 20 MIN: CPT | Performed by: PHYSICAL THERAPIST

## 2023-08-11 PROCEDURE — C1765 ADHESION BARRIER: HCPCS | Performed by: ORTHOPAEDIC SURGERY

## 2023-08-11 PROCEDURE — 0SG30A0 FUSION OF LUMBOSACRAL JOINT WITH INTERBODY FUSION DEVICE, ANTERIOR APPROACH, ANTERIOR COLUMN, OPEN APPROACH: ICD-10-PCS | Performed by: ORTHOPAEDIC SURGERY

## 2023-08-11 RX ORDER — OXYCODONE AND ACETAMINOPHEN 10; 325 MG/1; MG/1
1 TABLET ORAL ONCE AS NEEDED
Status: COMPLETED | OUTPATIENT
Start: 2023-08-11 | End: 2023-08-11

## 2023-08-11 RX ORDER — OXYCODONE HCL 20 MG/1
20 TABLET, FILM COATED, EXTENDED RELEASE ORAL ONCE
Status: COMPLETED | OUTPATIENT
Start: 2023-08-11 | End: 2023-08-11

## 2023-08-11 RX ORDER — OXYCODONE AND ACETAMINOPHEN 7.5; 325 MG/1; MG/1
2 TABLET ORAL EVERY 4 HOURS PRN
Status: DISCONTINUED | OUTPATIENT
Start: 2023-08-11 | End: 2023-08-15 | Stop reason: HOSPADM

## 2023-08-11 RX ORDER — AMOXICILLIN 250 MG
1 CAPSULE ORAL 2 TIMES DAILY
Status: DISCONTINUED | OUTPATIENT
Start: 2023-08-11 | End: 2023-08-15 | Stop reason: HOSPADM

## 2023-08-11 RX ORDER — SODIUM CHLORIDE 9 MG/ML
INJECTION, SOLUTION INTRAVENOUS AS NEEDED
Status: DISCONTINUED | OUTPATIENT
Start: 2023-08-11 | End: 2023-08-11 | Stop reason: HOSPADM

## 2023-08-11 RX ORDER — SODIUM CHLORIDE 0.9 % (FLUSH) 0.9 %
10 SYRINGE (ML) INJECTION EVERY 12 HOURS SCHEDULED
Status: DISCONTINUED | OUTPATIENT
Start: 2023-08-11 | End: 2023-08-11 | Stop reason: HOSPADM

## 2023-08-11 RX ORDER — LIDOCAINE HYDROCHLORIDE 20 MG/ML
INJECTION, SOLUTION EPIDURAL; INFILTRATION; INTRACAUDAL; PERINEURAL AS NEEDED
Status: DISCONTINUED | OUTPATIENT
Start: 2023-08-11 | End: 2023-08-11 | Stop reason: SURG

## 2023-08-11 RX ORDER — DROPERIDOL 2.5 MG/ML
0.62 INJECTION, SOLUTION INTRAMUSCULAR; INTRAVENOUS ONCE AS NEEDED
Status: COMPLETED | OUTPATIENT
Start: 2023-08-11 | End: 2023-08-11

## 2023-08-11 RX ORDER — HYDROMORPHONE HYDROCHLORIDE 1 MG/ML
0.5 INJECTION, SOLUTION INTRAMUSCULAR; INTRAVENOUS; SUBCUTANEOUS
Status: DISCONTINUED | OUTPATIENT
Start: 2023-08-11 | End: 2023-08-15 | Stop reason: HOSPADM

## 2023-08-11 RX ORDER — ONDANSETRON 2 MG/ML
4 INJECTION INTRAMUSCULAR; INTRAVENOUS
Status: DISCONTINUED | OUTPATIENT
Start: 2023-08-11 | End: 2023-08-11 | Stop reason: HOSPADM

## 2023-08-11 RX ORDER — EPHEDRINE SULFATE 50 MG/ML
INJECTION, SOLUTION INTRAVENOUS AS NEEDED
Status: DISCONTINUED | OUTPATIENT
Start: 2023-08-11 | End: 2023-08-11 | Stop reason: SURG

## 2023-08-11 RX ORDER — LABETALOL HYDROCHLORIDE 5 MG/ML
5 INJECTION, SOLUTION INTRAVENOUS
Status: DISCONTINUED | OUTPATIENT
Start: 2023-08-11 | End: 2023-08-11 | Stop reason: HOSPADM

## 2023-08-11 RX ORDER — ONDANSETRON 2 MG/ML
4 INJECTION INTRAMUSCULAR; INTRAVENOUS EVERY 6 HOURS PRN
Status: DISCONTINUED | OUTPATIENT
Start: 2023-08-11 | End: 2023-08-15 | Stop reason: HOSPADM

## 2023-08-11 RX ORDER — NALOXONE HCL 0.4 MG/ML
0.4 VIAL (ML) INJECTION
Status: DISCONTINUED | OUTPATIENT
Start: 2023-08-11 | End: 2023-08-15 | Stop reason: HOSPADM

## 2023-08-11 RX ORDER — ROCURONIUM BROMIDE 10 MG/ML
INJECTION, SOLUTION INTRAVENOUS AS NEEDED
Status: DISCONTINUED | OUTPATIENT
Start: 2023-08-11 | End: 2023-08-11 | Stop reason: SURG

## 2023-08-11 RX ORDER — SODIUM CHLORIDE 0.9 % (FLUSH) 0.9 %
3 SYRINGE (ML) INJECTION AS NEEDED
Status: DISCONTINUED | OUTPATIENT
Start: 2023-08-11 | End: 2023-08-11 | Stop reason: HOSPADM

## 2023-08-11 RX ORDER — ONDANSETRON 2 MG/ML
INJECTION INTRAMUSCULAR; INTRAVENOUS AS NEEDED
Status: DISCONTINUED | OUTPATIENT
Start: 2023-08-11 | End: 2023-08-11 | Stop reason: SURG

## 2023-08-11 RX ORDER — FLUMAZENIL 0.1 MG/ML
0.2 INJECTION INTRAVENOUS AS NEEDED
Status: DISCONTINUED | OUTPATIENT
Start: 2023-08-11 | End: 2023-08-11 | Stop reason: HOSPADM

## 2023-08-11 RX ORDER — ACETAMINOPHEN 500 MG
1000 TABLET ORAL ONCE
Status: COMPLETED | OUTPATIENT
Start: 2023-08-11 | End: 2023-08-11

## 2023-08-11 RX ORDER — SODIUM CHLORIDE 9 MG/ML
100 INJECTION, SOLUTION INTRAVENOUS CONTINUOUS
Status: DISCONTINUED | OUTPATIENT
Start: 2023-08-11 | End: 2023-08-15 | Stop reason: HOSPADM

## 2023-08-11 RX ORDER — SODIUM CHLORIDE 0.9 % (FLUSH) 0.9 %
10 SYRINGE (ML) INJECTION AS NEEDED
Status: DISCONTINUED | OUTPATIENT
Start: 2023-08-11 | End: 2023-08-11 | Stop reason: HOSPADM

## 2023-08-11 RX ORDER — FENTANYL CITRATE 50 UG/ML
25 INJECTION, SOLUTION INTRAMUSCULAR; INTRAVENOUS
Status: DISCONTINUED | OUTPATIENT
Start: 2023-08-11 | End: 2023-08-11 | Stop reason: HOSPADM

## 2023-08-11 RX ORDER — HYDROMORPHONE HYDROCHLORIDE 1 MG/ML
0.5 INJECTION, SOLUTION INTRAMUSCULAR; INTRAVENOUS; SUBCUTANEOUS
Status: DISCONTINUED | OUTPATIENT
Start: 2023-08-11 | End: 2023-08-11 | Stop reason: HOSPADM

## 2023-08-11 RX ORDER — FLUCONAZOLE 2 MG/ML
INJECTION, SOLUTION INTRAVENOUS AS NEEDED
Status: DISCONTINUED | OUTPATIENT
Start: 2023-08-11 | End: 2023-08-11 | Stop reason: SURG

## 2023-08-11 RX ORDER — LIDOCAINE HYDROCHLORIDE 10 MG/ML
0.5 INJECTION, SOLUTION EPIDURAL; INFILTRATION; INTRACAUDAL; PERINEURAL ONCE AS NEEDED
Status: DISCONTINUED | OUTPATIENT
Start: 2023-08-11 | End: 2023-08-11 | Stop reason: HOSPADM

## 2023-08-11 RX ORDER — SODIUM CHLORIDE, SODIUM LACTATE, POTASSIUM CHLORIDE, CALCIUM CHLORIDE 600; 310; 30; 20 MG/100ML; MG/100ML; MG/100ML; MG/100ML
1000 INJECTION, SOLUTION INTRAVENOUS CONTINUOUS
Status: DISCONTINUED | OUTPATIENT
Start: 2023-08-11 | End: 2023-08-11

## 2023-08-11 RX ORDER — PROPOFOL 10 MG/ML
VIAL (ML) INTRAVENOUS AS NEEDED
Status: DISCONTINUED | OUTPATIENT
Start: 2023-08-11 | End: 2023-08-11 | Stop reason: SURG

## 2023-08-11 RX ORDER — ATORVASTATIN CALCIUM 40 MG/1
40 TABLET, FILM COATED ORAL DAILY
Status: DISCONTINUED | OUTPATIENT
Start: 2023-08-11 | End: 2023-08-15 | Stop reason: HOSPADM

## 2023-08-11 RX ORDER — POLYETHYLENE GLYCOL 3350 17 G/17G
17 POWDER, FOR SOLUTION ORAL DAILY PRN
Status: DISCONTINUED | OUTPATIENT
Start: 2023-08-11 | End: 2023-08-13

## 2023-08-11 RX ORDER — SODIUM CHLORIDE, SODIUM LACTATE, POTASSIUM CHLORIDE, CALCIUM CHLORIDE 600; 310; 30; 20 MG/100ML; MG/100ML; MG/100ML; MG/100ML
100 INJECTION, SOLUTION INTRAVENOUS CONTINUOUS PRN
Status: DISCONTINUED | OUTPATIENT
Start: 2023-08-11 | End: 2023-08-15 | Stop reason: HOSPADM

## 2023-08-11 RX ORDER — ONDANSETRON 4 MG/1
4 TABLET, FILM COATED ORAL EVERY 6 HOURS PRN
Status: DISCONTINUED | OUTPATIENT
Start: 2023-08-11 | End: 2023-08-15 | Stop reason: HOSPADM

## 2023-08-11 RX ORDER — SODIUM CHLORIDE 9 MG/ML
40 INJECTION, SOLUTION INTRAVENOUS AS NEEDED
Status: DISCONTINUED | OUTPATIENT
Start: 2023-08-11 | End: 2023-08-11 | Stop reason: HOSPADM

## 2023-08-11 RX ORDER — ACETAMINOPHEN 325 MG/1
650 TABLET ORAL EVERY 4 HOURS PRN
Status: DISCONTINUED | OUTPATIENT
Start: 2023-08-11 | End: 2023-08-15 | Stop reason: HOSPADM

## 2023-08-11 RX ORDER — DEXTROSE MONOHYDRATE 25 G/50ML
12.5 INJECTION, SOLUTION INTRAVENOUS AS NEEDED
Status: DISCONTINUED | OUTPATIENT
Start: 2023-08-11 | End: 2023-08-11 | Stop reason: HOSPADM

## 2023-08-11 RX ORDER — CYCLOBENZAPRINE HCL 10 MG
10 TABLET ORAL 3 TIMES DAILY PRN
Status: DISCONTINUED | OUTPATIENT
Start: 2023-08-11 | End: 2023-08-15 | Stop reason: HOSPADM

## 2023-08-11 RX ORDER — DEXAMETHASONE SODIUM PHOSPHATE 4 MG/ML
INJECTION, SOLUTION INTRA-ARTICULAR; INTRALESIONAL; INTRAMUSCULAR; INTRAVENOUS; SOFT TISSUE AS NEEDED
Status: DISCONTINUED | OUTPATIENT
Start: 2023-08-11 | End: 2023-08-11 | Stop reason: SURG

## 2023-08-11 RX ORDER — OXYCODONE AND ACETAMINOPHEN 7.5; 325 MG/1; MG/1
1 TABLET ORAL EVERY 4 HOURS PRN
Status: DISCONTINUED | OUTPATIENT
Start: 2023-08-11 | End: 2023-08-15 | Stop reason: HOSPADM

## 2023-08-11 RX ORDER — LOSARTAN POTASSIUM 50 MG/1
100 TABLET ORAL
Status: DISCONTINUED | OUTPATIENT
Start: 2023-08-11 | End: 2023-08-15 | Stop reason: HOSPADM

## 2023-08-11 RX ORDER — SODIUM CHLORIDE 0.9 % (FLUSH) 0.9 %
3 SYRINGE (ML) INJECTION EVERY 12 HOURS SCHEDULED
Status: DISCONTINUED | OUTPATIENT
Start: 2023-08-11 | End: 2023-08-15 | Stop reason: HOSPADM

## 2023-08-11 RX ORDER — SODIUM CHLORIDE, SODIUM LACTATE, POTASSIUM CHLORIDE, CALCIUM CHLORIDE 600; 310; 30; 20 MG/100ML; MG/100ML; MG/100ML; MG/100ML
9 INJECTION, SOLUTION INTRAVENOUS CONTINUOUS
Status: DISCONTINUED | OUTPATIENT
Start: 2023-08-11 | End: 2023-08-15 | Stop reason: HOSPADM

## 2023-08-11 RX ORDER — HYDROCHLOROTHIAZIDE 25 MG/1
12.5 TABLET ORAL
Status: DISCONTINUED | OUTPATIENT
Start: 2023-08-11 | End: 2023-08-15 | Stop reason: HOSPADM

## 2023-08-11 RX ORDER — KETAMINE HCL IN NACL, ISO-OSM 100MG/10ML
SYRINGE (ML) INJECTION AS NEEDED
Status: DISCONTINUED | OUTPATIENT
Start: 2023-08-11 | End: 2023-08-11 | Stop reason: SURG

## 2023-08-11 RX ORDER — SODIUM CHLORIDE 9 MG/ML
40 INJECTION, SOLUTION INTRAVENOUS AS NEEDED
Status: DISCONTINUED | OUTPATIENT
Start: 2023-08-11 | End: 2023-08-15 | Stop reason: HOSPADM

## 2023-08-11 RX ORDER — PROPAFENONE HYDROCHLORIDE 150 MG/1
300 TABLET, COATED ORAL EVERY 8 HOURS SCHEDULED
Status: DISCONTINUED | OUTPATIENT
Start: 2023-08-11 | End: 2023-08-15 | Stop reason: HOSPADM

## 2023-08-11 RX ORDER — SODIUM CHLORIDE 0.9 % (FLUSH) 0.9 %
10 SYRINGE (ML) INJECTION AS NEEDED
Status: DISCONTINUED | OUTPATIENT
Start: 2023-08-11 | End: 2023-08-15 | Stop reason: HOSPADM

## 2023-08-11 RX ORDER — MIDAZOLAM HYDROCHLORIDE 1 MG/ML
0.5 INJECTION INTRAMUSCULAR; INTRAVENOUS
Status: DISCONTINUED | OUTPATIENT
Start: 2023-08-11 | End: 2023-08-11 | Stop reason: HOSPADM

## 2023-08-11 RX ORDER — CEFAZOLIN SODIUM IN 0.9 % NACL 3 G/100 ML
3000 INTRAVENOUS SOLUTION, PIGGYBACK (ML) INTRAVENOUS ONCE
Status: COMPLETED | OUTPATIENT
Start: 2023-08-11 | End: 2023-08-11

## 2023-08-11 RX ORDER — NALOXONE HCL 0.4 MG/ML
0.04 VIAL (ML) INJECTION AS NEEDED
Status: DISCONTINUED | OUTPATIENT
Start: 2023-08-11 | End: 2023-08-11 | Stop reason: HOSPADM

## 2023-08-11 RX ORDER — IBUPROFEN 600 MG/1
600 TABLET ORAL ONCE AS NEEDED
Status: DISCONTINUED | OUTPATIENT
Start: 2023-08-11 | End: 2023-08-11 | Stop reason: HOSPADM

## 2023-08-11 RX ORDER — FENTANYL CITRATE 50 UG/ML
INJECTION, SOLUTION INTRAMUSCULAR; INTRAVENOUS AS NEEDED
Status: DISCONTINUED | OUTPATIENT
Start: 2023-08-11 | End: 2023-08-11 | Stop reason: SURG

## 2023-08-11 RX ADMIN — FENTANYL CITRATE 250 MCG: 50 INJECTION, SOLUTION INTRAMUSCULAR; INTRAVENOUS at 07:16

## 2023-08-11 RX ADMIN — CEFAZOLIN 2000 MG: 2 INJECTION, POWDER, FOR SOLUTION INTRAMUSCULAR; INTRAVENOUS at 14:23

## 2023-08-11 RX ADMIN — EMPAGLIFLOZIN 25 MG: 25 TABLET, FILM COATED ORAL at 14:34

## 2023-08-11 RX ADMIN — SODIUM CHLORIDE 100 ML/HR: 9 INJECTION, SOLUTION INTRAVENOUS at 14:24

## 2023-08-11 RX ADMIN — CEFAZOLIN 2000 MG: 2 INJECTION, POWDER, FOR SOLUTION INTRAMUSCULAR; INTRAVENOUS at 22:19

## 2023-08-11 RX ADMIN — DROPERIDOL 0.62 MG: 2.5 INJECTION, SOLUTION INTRAMUSCULAR; INTRAVENOUS at 11:25

## 2023-08-11 RX ADMIN — PROPOFOL INJECTABLE EMULSION 100 MG: 10 INJECTION, EMULSION INTRAVENOUS at 09:05

## 2023-08-11 RX ADMIN — FLUCONAZOLE 200 MG: 2 INJECTION INTRAVENOUS at 07:30

## 2023-08-11 RX ADMIN — PROPAFENONE HYDROCHLORIDE 300 MG: 150 TABLET, FILM COATED ORAL at 21:02

## 2023-08-11 RX ADMIN — HYDROCHLOROTHIAZIDE 12.5 MG: 25 TABLET ORAL at 14:34

## 2023-08-11 RX ADMIN — DEXAMETHASONE SODIUM PHOSPHATE 4 MG: 4 INJECTION, SOLUTION INTRA-ARTICULAR; INTRALESIONAL; INTRAMUSCULAR; INTRAVENOUS; SOFT TISSUE at 08:54

## 2023-08-11 RX ADMIN — SODIUM CHLORIDE, POTASSIUM CHLORIDE, SODIUM LACTATE AND CALCIUM CHLORIDE 1000 ML: 600; 310; 30; 20 INJECTION, SOLUTION INTRAVENOUS at 06:35

## 2023-08-11 RX ADMIN — ATORVASTATIN CALCIUM 40 MG: 40 TABLET, FILM COATED ORAL at 14:34

## 2023-08-11 RX ADMIN — LIDOCAINE HYDROCHLORIDE 100 MG: 20 INJECTION, SOLUTION EPIDURAL; INFILTRATION; INTRACAUDAL; PERINEURAL at 07:16

## 2023-08-11 RX ADMIN — ACETAMINOPHEN 1000 MG: 500 TABLET, FILM COATED ORAL at 06:48

## 2023-08-11 RX ADMIN — Medication 3 ML: at 13:00

## 2023-08-11 RX ADMIN — ROCURONIUM BROMIDE 50 MG: 50 INJECTION INTRAVENOUS at 07:35

## 2023-08-11 RX ADMIN — OXYCODONE HYDROCHLORIDE 20 MG: 20 TABLET, FILM COATED, EXTENDED RELEASE ORAL at 06:35

## 2023-08-11 RX ADMIN — Medication 3000 MG: at 07:13

## 2023-08-11 RX ADMIN — METFORMIN HCL 1000 MG: 500 TABLET ORAL at 17:24

## 2023-08-11 RX ADMIN — Medication 3 ML: at 20:51

## 2023-08-11 RX ADMIN — PROPAFENONE HYDROCHLORIDE 300 MG: 150 TABLET, FILM COATED ORAL at 14:34

## 2023-08-11 RX ADMIN — Medication 50 MG: at 07:16

## 2023-08-11 RX ADMIN — ROCURONIUM BROMIDE 25 MG: 50 INJECTION INTRAVENOUS at 08:15

## 2023-08-11 RX ADMIN — OXYCODONE AND ACETAMINOPHEN 1 TABLET: 10; 325 TABLET ORAL at 11:35

## 2023-08-11 RX ADMIN — PROPOFOL INJECTABLE EMULSION 200 MG: 10 INJECTION, EMULSION INTRAVENOUS at 07:15

## 2023-08-11 RX ADMIN — DOCUSATE SODIUM 50 MG AND SENNOSIDES 8.6 MG 1 TABLET: 8.6; 5 TABLET, FILM COATED ORAL at 20:51

## 2023-08-11 RX ADMIN — ONDANSETRON 4 MG: 2 INJECTION INTRAMUSCULAR; INTRAVENOUS at 08:54

## 2023-08-11 RX ADMIN — HYDROMORPHONE HYDROCHLORIDE 1 MG: 1 INJECTION, SOLUTION INTRAMUSCULAR; INTRAVENOUS; SUBCUTANEOUS at 09:02

## 2023-08-11 RX ADMIN — LOSARTAN POTASSIUM 100 MG: 50 TABLET, FILM COATED ORAL at 14:34

## 2023-08-11 RX ADMIN — EPHEDRINE SULFATE 25 MG: 50 INJECTION INTRAVENOUS at 07:27

## 2023-08-11 RX ADMIN — SUGAMMADEX 200 MG: 100 INJECTION, SOLUTION INTRAVENOUS at 08:50

## 2023-08-11 RX ADMIN — DOCUSATE SODIUM 50 MG AND SENNOSIDES 8.6 MG 1 TABLET: 8.6; 5 TABLET, FILM COATED ORAL at 14:34

## 2023-08-11 NOTE — ANESTHESIA PREPROCEDURE EVALUATION
Anesthesia Evaluation     Patient summary reviewed   no history of anesthetic complications:   NPO Solid Status: > 8 hours             Airway   Mallampati: II  TM distance: >3 FB  Neck ROM: full  Dental      Pulmonary    (+) pulmonary embolism,sleep apnea on CPAP  (-) COPD, asthma, not a smoker  Cardiovascular   Exercise tolerance: good (4-7 METS)    ECG reviewed    (+) hypertensionhyperlipidemia  (-) pacemaker, past MI, angina, cardiac stents      Neuro/Psych  (-) seizures, TIA, CVA  GI/Hepatic/Renal/Endo    (+) obesity, diabetes mellitus  (-) GERD, liver disease, no renal disease    Musculoskeletal     Abdominal    Substance History      OB/GYN          Other                      Anesthesia Plan    ASA 2     general     intravenous induction     Anesthetic plan, risks, benefits, and alternatives have been provided, discussed and informed consent has been obtained with: patient.    CODE STATUS:

## 2023-08-11 NOTE — THERAPY EVALUATION
Patient Name: Bella Ordaz  : 1955    MRN: [id number]                              Today's Date: 2023       Admit Date: 2023    Visit Dx:     ICD-10-CM ICD-9-CM   1. Impaired mobility [Z74.09 (ICD-10-CM)]  Z74.09 799.89     Patient Active Problem List   Diagnosis    Primary hypertension    Paroxysmal atrial fibrillation    Type 2 diabetes mellitus without complication, without long-term current use of insulin    Mixed hyperlipidemia    Lumbosacral disc disease    Chronic bilateral low back pain with bilateral sciatica    Lumbago of multiple sites in spine with sciatica    Lumbar radiculopathy     Past Medical History:   Diagnosis Date    Arthritis     Chronic left-sided low back pain     radiates down left leg    Diabetes mellitus     Facet arthropathy, lumbar     Hypertension     Pulmonary embolism 10/2022    pt states 2-3 blood clots in the lung    Sleep apnea     pt states doesn't really use his cpap machine     Past Surgical History:   Procedure Laterality Date    APPENDECTOMY      as a kid    CYST REMOVAL      tailbone    HAND SURGERY Left     nerve/tendon repair    KNEE ARTHROSCOPY Bilateral     x2 left, x1 right    LAPAROSCOPIC CHOLECYSTECTOMY      REPLACEMENT TOTAL KNEE Left 2023    SPINAL CORD DECOMPRESSION        General Information       Row Name 23 1511          Physical Therapy Time and Intention    Document Type --  s/p ALIF L5-S1 due to back pain, L buttock, thigh, and leg radiculopathy, neurogenic claudication  -MS     Mode of Treatment physical therapy;individual therapy  -MS       Row Name 23 1511          General Information    Patient Profile Reviewed yes  -MS     Prior Level of Function independent:;all household mobility;ADL's  walking with a cane  -MS     Existing Precautions/Restrictions spinal;LSO;brace worn when out of bed;fall;other (see comments)  provena wound vac  -MS     Barriers to Rehab none identified  -MS       Row Name 23 1513           Living Environment    People in Home spouse  -MS       Row Name 08/11/23 1511          Home Main Entrance    Number of Stairs, Main Entrance other (see comments)  ramp  -MS       Row Name 08/11/23 1511          Stairs Within Home, Primary    Number of Stairs, Within Home, Primary none  -MS       Row Name 08/11/23 1511          Cognition    Orientation Status (Cognition) oriented x 4  -MS               User Key  (r) = Recorded By, (t) = Taken By, (c) = Cosigned By      Initials Name Provider Type    Miranda Simons, PT, DPT, NCS Physical Therapist                   Mobility       Row Name 08/11/23 1511          Bed Mobility    Bed Mobility rolling right;sidelying-sit  -MS     Rolling Right [place] (Bed Mobility) contact guard;verbal cues  -MS     Sidelying-Sit [place] (Bed Mobility) contact guard  -MS     Assistive Device (Bed Mobility) bed rails  -MS       Row Name 08/11/23 1511          Sit-Stand Transfer    Sit-Stand [place] (Transfers) contact guard;verbal cues  -MS     Assistive Device (Sit-Stand Transfers) walker, front-wheeled  -MS       Row Name 08/11/23 1511          Gait/Stairs (Locomotion)    [place] Level (Gait) contact guard  -MS     Assistive Device (Gait) walker, front-wheeled  -MS     Distance in Feet (Gait) 300ft forward flexed posture  -MS               User Key  (r) = Recorded By, (t) = Taken By, (c) = Cosigned By      Initials Name Provider Type    Miranda Simons, PT, DPT, NCS Physical Therapist                   Obj/Interventions       Row Name 08/11/23 1511          Range of Motion Comprehensive    General Range of Motion bilateral upper extremity ROM WFL;bilateral lower extremity ROM WFL  -MS       Row Name 08/11/23 1511          Strength Comprehensive (MMT)    Comment, General Manual Muscle Testing (MMT) Assessment L hamstring 4-/5  -MS       Row Name 08/11/23 1511          Balance    Balance Assessment sitting static balance;sitting dynamic balance;standing static  balance;standing dynamic balance  -MS     Static Sitting Balance independent  -MS     Dynamic Sitting Balance independent  -MS     Position, Sitting Balance sitting edge of bed  -MS     Static Standing Balance contact guard  -MS     Dynamic Standing Balance contact guard  -MS     Position/Device Used, Standing Balance walker, rolling  -MS       Row Name 08/11/23 1511          Sensory Assessment (Somatosensory)    Sensory Assessment (Somatosensory) sensation intact  -MS               User Key  (r) = Recorded By, (t) = Taken By, (c) = Cosigned By      Initials Name Provider Type    Miranda Simons, PT, DPT, NCS Physical Therapist                   Goals/Plan       Row Name 08/11/23 1512          Bed Mobility Goal 1 (PT)    Activity/Assistive Device (Bed Mobility Goal 1, PT) bed mobility activities, all  -MS     [place] Level/Cues Needed (Bed Mobility Goal 1, PT) independent  -MS     Time Frame (Bed Mobility Goal 1, PT) long term goal (LTG);by discharge  -MS     Progress/Outcomes (Bed Mobility Goal 1, PT) new goal  -MS       Row Name 08/11/23 1512          Transfer Goal 1 (PT)    Activity/Assistive Device (Transfer Goal 1, PT) sit-to-stand/stand-to-sit;bed-to-chair/chair-to-bed;walker, rolling  -MS     [place] Level/Cues Needed (Transfer Goal 1, PT) independent  -MS     Time Frame (Transfer Goal 1, PT) long term goal (LTG);by discharge  -MS     Progress/Outcome (Transfer Goal 1, PT) new goal  -MS       Row Name 08/11/23 1512          Gait Training Goal 1 (PT)    Activity/Assistive Device (Gait Training Goal 1, PT) gait (walking locomotion);assistive device use;decrease fall risk;diminish gait deviation;improve balance and speed;increase endurance/gait distance;walker, rolling  -MS     [place] Level (Gait Training Goal 1, PT) modified independence  -MS     Distance (Gait Training Goal 1, PT) 400ft  -MS     Time Frame (Gait Training Goal 1, PT) long term goal (LTG);by discharge  -MS      Progress/Outcome (Gait Training Goal 1, PT) new goal  -MS       Row Name 08/11/23 1512          Therapy Assessment/Plan (PT)    Planned Therapy Interventions (PT) balance training;bed mobility training;gait training;patient/family education;orthotic fitting/training;strengthening;transfer training  -MS               User Key  (r) = Recorded By, (t) = Taken By, (c) = Cosigned By      Initials Name Provider Type    MS Miranda Edmond R, PT, DPT, NCS Physical Therapist                   Clinical Impression       Row Name 08/11/23 1512          Pain    Pretreatment Pain Rating 6/10  -MS     Posttreatment Pain Rating 6/10  -MS     Pain Location - back  -MS     Pain Intervention(s) Medication (See MAR);Repositioned;Ambulation/increased activity  -MS       Row Name 08/11/23 1512          Plan of Care Review    Plan of Care Reviewed With patient;spouse  -MS     Progress improving  -MS     Outcome Evaluation The patient presents alert and oriented x4 with LSO in the room. The patient demonstrates L hamstring weakness that he states is chronic since his L knee TKA in May of 2023. He has no sensation deficits or cooridnation deficits. He was able to walk in the hallway with use  of a RW with a forward flexed posture. He plans to have a second lumbar surgery on Monday. PT will continue to work with him to review spinal restrictions and increase his activity tolerance and strength. Recommend discharge home with assist upon discharge.  -MS       Row Name 08/11/23 1512          Therapy Assessment/Plan (PT)    Patient/Family Therapy Goals Statement (PT) go home  -MS     Rehab Potential (PT) good, to achieve stated therapy goals  -MS     Criteria for Skilled Interventions Met (PT) yes;meets criteria;skilled treatment is necessary  -MS     Therapy Frequency (PT) 2 times/day  -MS     Predicted Duration of Therapy Intervention (PT) until discharge  -MS       Row Name 08/11/23 1512          Vital Signs    Pre SpO2 (%) 98  -MS     O2  Delivery Pre Treatment supplemental O2  -MS     O2 Delivery Intra Treatment room air  -MS       Row Name 08/11/23 1512          Positioning and Restraints    Post Treatment Position bed  -MS     In Bed fowlers;call light within reach;encouraged to call for assist;with family/caregiver;side rails up x2  -MS               User Key  (r) = Recorded By, (t) = Taken By, (c) = Cosigned By      Initials Name Provider Type    MS Edmond Miranda SARGENT, PT, DPT, NCS Physical Therapist                   Outcome Measures       Row Name 08/11/23 1511 08/11/23 1223       How much help from another person do you currently need...    Turning from your back to your side while in flat bed without using bedrails? 3  -MS 3  -KS    Moving from lying on back to sitting on the side of a flat bed without bedrails? 3  -MS 3  -KS    Moving to and from a bed to a chair (including a wheelchair)? 3  -MS 3  -KS    Standing up from a chair using your arms (e.g., wheelchair, bedside chair)? 3  -MS 3  -KS    Climbing 3-5 steps with a railing? 3  -MS 3  -KS    To walk in hospital room? 3  -MS 3  -KS    AM-PAC 6 Clicks Score (PT) 18  -MS 18  -KS    Highest level of mobility 6 --> Walked 10 steps or more  -MS 6 --> Walked 10 steps or more  -KS      Row Name 08/11/23 1511 08/11/23 1309       Functional Assessment    Outcome Measure Options AM-PAC 6 Clicks Basic Mobility (PT)  -MS AM-PAC 6 Clicks Daily Activity (OT)  -LS              User Key  (r) = Recorded By, (t) = Taken By, (c) = Cosigned By      Initials Name Provider Type    MS Miranda Edmond, PT, DPT, NCS Physical Therapist    Gail Batista RN Registered Nurse    Ilsa Olson, OTR/L Occupational Therapist                                 Physical Therapy Education       Title: PT OT SLP Therapies (In Progress)       Topic: Physical Therapy (In Progress)       Point: Mobility training (Done)       Learning Progress Summary             Patient Acceptance, E, VU by MS at 8/11/2023 151     Comment: role of PT in his care, spinal restrictions, use of LSO                         Point: Home exercise program (Not Started)       Learner Progress:  Not documented in this visit.              Point: Body mechanics (Not Started)       Learner Progress:  Not documented in this visit.              Point: Precautions (Done)       Learning Progress Summary             Patient Acceptance, E, VU by MS at 8/11/2023 1514    Comment: role of PT in his care, spinal restrictions, use of LSO                                         User Key       Initials Effective Dates Name Provider Type Discipline    MS 07/11/23 -  Miranda Edmnod, PT, DPT, NCS Physical Therapist PT                  PT Recommendation and Plan  Planned Therapy Interventions (PT): balance training, bed mobility training, gait training, patient/family education, orthotic fitting/training, strengthening, transfer training  Plan of Care Reviewed With: patient, spouse  Progress: improving  Outcome Evaluation: The patient presents alert and oriented x4 with LSO in the room. The patient demonstrates L hamstring weakness that he states is chronic since his L knee TKA in May of 2023. He has no sensation deficits or cooridnation deficits. He was able to walk in the hallway with use  of a RW with a forward flexed posture. He plans to have a second lumbar surgery on Monday. PT will continue to work with him to review spinal restrictions and increase his activity tolerance and strength. Recommend discharge home with assist upon discharge.     Time Calculation:         PT Charges       Row Name 08/11/23 1511             Time Calculation    Start Time 1505  -MS      Stop Time 1545  -MS      Time Calculation (min) 40 min  -MS      PT Received On 08/11/23  -MS      PT Goal Re-Cert Due Date 08/21/23  -MS         Untimed Charges    PT Eval/Re-eval Minutes 40  -MS         Total Minutes    Untimed Charges Total Minutes 40  -MS       Total Minutes 40  -MS                 User Key  (r) = Recorded By, (t) = Taken By, (c) = Cosigned By      Initials Name Provider Type    Miranda Simons, PT, DPT, NCS Physical Therapist                      PT G-Codes  Outcome Measure Options: AM-PAC 6 Clicks Basic Mobility (PT)  AM-PAC 6 Clicks Score (PT): 18  AM-PAC 6 Clicks Score (OT): 17  PT Discharge Summary  Anticipated Discharge Disposition (PT): home with assist    Miranda Edmond, PT, DPT, BIMAL  8/11/2023

## 2023-08-11 NOTE — PLAN OF CARE
Goal Outcome Evaluation:  Plan of Care Reviewed With: patient, spouse        Progress: improving  Outcome Evaluation: The patient presents alert and oriented x4 with LSO in the room. The patient demonstrates L hamstring weakness that he states is chronic since his L knee TKA in May of 2023. He has no sensation deficits or cooridnation deficits. He was able to walk in the hallway with use  of a RW with a forward flexed posture. He plans to have a second lumbar surgery on Monday. PT will continue to work with him to review spinal restrictions and increase his activity tolerance and strength. Recommend discharge home with assist upon discharge.      Anticipated Discharge Disposition (PT): home with assist

## 2023-08-11 NOTE — OP NOTE
LUMBAR ANTERIOR INTERBODY FUSION  Procedure Note    Bella Ordaz  8/11/2023    Pre-op Diagnosis:    1. Status post left L5-S1 hemilaminotomy, foraminotomy, decompression, 05/15/2013.   2. Mild chronic low back pain.  3. Recurrent left buttock, thigh and leg radiculopathy.  4. Neurogenic claudication.  5. Multilevel lumbar degenerative disc disease.   6. Multilevel lumbar facet arthropathy.  7. Small central disc herniation L2-3.   8. Mild spondylolisthesis, L4-5.   9. Central stenosis L2-3.   10. Severe foraminal stenosis, left L5-S1.    Post-op Diagnosis:    same    Procedure/CPTr Codes:     1. Anterior discectomy decompression with bilateral neural foraminotomy L5-S1  2. Anterior lumbar interbody fusion L5-S1  3. Anterior spinal instrumentation L5-S1 (ATEC anterior plate and screws)  4. Use of titanium interbody biomechanical device for fusion L5-S1 ([employer]EC titanium spacer and screws)  5. Use of allograft bone matrix for fusion L5-S1 (OsteoAmp)  6. Use of fluoroscopy for confirmation of surgical level, placement of interbody spacer and instrumentation  7. Intraoperative neural monitoring     Anesthesia: General     Surgeon: BONIFACIO Devi MD     Co Surgeon: Dr. Sahil Armas D.O.     Assistant: Pro Donnelly PA-C     Estimated Blood Loss: 50 mL     Complications: None     Condition: Stable to PACU.     Indications:     The patient is a 68-year-old who sees Dr. Geovanny Olivarez for medical issues.  He originally presented to the office in 2013 with left leg radiculopathy.  We elected to proceed with a left L5-S1 hemilaminotomy and foraminotomies decompression which was performed on 5/15/2013.  The patient did reasonably well for a period of time but presented back to the office with mild chronic low back pain and a recurrence of his left leg radiculopathy as well as symptoms consistent with neurogenic claudication.  Repeat imaging studies revealed multilevel lumbar degenerative disc disease and facet arthropathy  with a small central disc herniation causing central stenosis at L2-3, a mild spondylolisthesis at L4-5, but more importantly, severe foraminal stenosis once again on the left side of L5-S1.  It was felt that the stenosis at L5-S1 was contributing to his recurrent symptoms, however it is possible he may require additional surgery at the L2-3 level at a later date.    After failing all conservative measures, it was mutually decided that surgery would be the best option.  Risks, benefits, and complications of surgery were discussed in detail. The patient appeared well informed and wished to proceed. We specifically discussed the risk of infection, blood loss, nerve root injury, CSF leak, and the possibility of incomplete resolution of symptoms. We also discussed the possible risk of a nonunion and the potential need for additional surgery in the event of a pseudoarthrosis or hardware failure.    We elected to proceed with a staged operation.  Today we are performing an anterior decompression and fusion of L5-S1, it is planned that we will be returning to surgery for a second posterior procedure at a later date.     Operative Procedure:     After obtaining informed consent and verifying the correct operative level, the patient was brought to the operating room and placed supine on an operating table. A general anesthetic was provided by the anesthesia service with the assistance of an endotracheal tube. Once this was appropriately positioned and secured, the anterior abdominal region was prepped and draped in usual sterile fashion. A surgical timeout was taken to confirm this was the correct patient, we were working at the correct level, and that preoperative antibiotics were given in a timely fashion.     At this point, Dr. Sahil Armas D.O. provided vascular access to the L5-S1 level. He performed a left sided anterior retroperitoneal approach to the L5-S1 segment. Please see his separate dictated operative report  regarding the details on the approach itself.  When I entered the procedure, self retaining retractors were already in position with excellent exposure of the L5-S1 disc space.     After confirming we were at the correct level using fluoroscopy, I used a long handle 10 blade scalpel to cut into the L5-S1 disc space. A Fuller elevator was used to remove disc material off of the endplates. Disc material was retrieved using pituitaries and Kerrisons. A disc space distractor was then placed into the L5-S1 disc space and I used curettes to remove posterior disc material. Kerrisons were used to remove posterior osteophytes across the endplates of L5 and S1. There was high-grade stenosis centrally but also foraminally. I then performed a bilateral neural foraminotomy with Kerrisons and curettes. The decompression was much more involved than what is usually required for an anterior lumbar interbody fusion by itself and required significantly more time to perform.  This was due to the severe disc space collapse and high-grade foraminal stenosis especially on the left as expected.     After the decompression was completed bilaterally and centrally, I used a series of endplate scrapers to prepare the endplates for interbody fusion. Trial spacers were then malleted into position and it was felt that and 18 mm titanium spacer with 15 degrees lordosis from the ATEC instrumentation set would be the best fit to restore disc height also restoring foraminal height and providing some indirect decompression. The disc space was then thoroughly irrigated with saline solution.  Gelfoam powder with thrombin was used to control epidural bleeding.     An 18 mm titanium spacer from the [employer] instrumentation set was then packed as tightly as possible with an allograft bone matrix called OsteoAmp. This spacer was then malleted into the L5-S1 disc space under fluoroscopic guidance. It was placed as an interbody biomechanical device to assist with  fusion.  I then placed an 8.5 mm x 30 mm graft bolt through the spacer into L5 and a 5.0 mm x 30 mm screw through the spacer into S1.  The screws were placed to help maintain position of the spacer as well as to assist with the fusion process.     A 4-hole anterior plate from the [facility] instrumentation set was then chosen. The 4 holes were drilled and four 6.0 mm x 30 mm screws were used to fix the plate across the L5-S1 segment augmenting the fusion.      We then inspected the entire operative field for any signs of bleeding.  Bleeding was once again controlled using thrombin with Gelfoam powder and bipolar cautery.  Once we ensured that adequate hemostasis was accomplished, final fluoroscopy imaging was taken to confirm adequate position of our implants. There was excellent restoration of disc height and the plate and screw construct appeared to be adequately positioned across L5-S1.    I placed a VersaWrap over the anterior instrumentation to hopefully decrease the risk of scarring and postoperative adhesions.     Please see Dr. Sahil Armas's separate dictated operative report regarding the closure of the wound. Once this was accomplished, the patient was extubated and sent to the recovery room in good stable condition. We estimated blood loss to be approximately 50 mL and the patient remained hemodynamically stable during the procedure.     Intraoperative neuro monitoring was ordered and carried out throughout the procedure to add an increased level of safety for the patient.  The interpreting physician was available by means of real-time continuous, bidirectional, remote audio and visual communication as needed throughout the entire procedure.  Modalities used during the procedure included SSEP, EEG, MEP, EMG, and TOF.  There were no neuro monitoring signal changes during the procedure.    Dr. Sahil Armas D.O. provided vascular access to the L5-S1 level and acted as a co-surgeon in this fashion.  Pro  Cony OTERO provided critical assistance during the decompression at L5-S1 as well as during the placement of the titanium spacer, bone graft and instrumentation to obtain a fusion across L5-S1.    BONIFACIO Devi MD    Date: 8/11/2023  Time: 11:13 CDT

## 2023-08-11 NOTE — PLAN OF CARE
Goal Outcome Evaluation:           Progress: improving  Outcome Evaluation: Pt. received from PACU this shift, VSS, PPP, A&Ox4, pt. c/o mild-moderate pain, IV antibx infused, provena wound vac to lower abdomen with continuous suction, CDI, wife at bedside, safety maintained, pt. expresses no other needs as of now, plan of care ongoing.

## 2023-08-11 NOTE — PLAN OF CARE
Goal Outcome Evaluation:  Plan of Care Reviewed With: patient, spouse        Progress: no change  Outcome Evaluation: OT eval completed. Pt in fowlers upon therapist arrival; A&Ox4; 7/10 pain reported in back; Pt's wife also present. Pt reports Mod I-I with all BADLs including fxl ambulation at [facility]. Today, Pt was educated on spinal precautions; Pt verbalized understanding. Pt performed log roll onto R side utilizing bedrail with CGA and verbal cues; sidelying>sit utilizing bedrail with Min A and verbal cues. Pt fit for LSO while seated EOB and educated on donning/doffing of LSO and LSO wear schedule; Pt verbalized understanding and required Mod A to correct ebenezer LSO. Pt dependent for donning B socks while seated EOB. Pt performed sit<>stand from slightly raised bed with CGA and verbal cues to push from bed. Pt ambulated from bed<>BR utilizing rwx with CGA. Pt performed sit<>stand toilet transfer utilizing grab bar and sink with CGA; Pt required Min A for clothing management. BUE strength WNL. Skilled OT intervention indicated in order to address deficits in fxl mobility, fxl activity tolerance, balance, and use of adaptive techniques/equipment during performance of BADLs. Discharge recommendation pending second surgery.

## 2023-08-11 NOTE — ANESTHESIA PROCEDURE NOTES
Airway  Urgency: elective    Date/Time: 8/11/2023 7:17 AM  Airway not difficult    General Information and Staff    Patient location during procedure: OR  CRNA/CAA: ROSALIA Batista CRNA    Indications and Patient Condition  Indications for airway management: airway protection    Preoxygenated: yes  Mask difficulty assessment: 0 - not attempted    Final Airway Details  Final airway type: endotracheal airway      Successful airway: ETT  Cuffed: yes   Successful intubation technique: direct laryngoscopy  Blade: Ritter  Blade size: 2  ETT size (mm): 7.5  Cormack-Lehane Classification: grade I - full view of glottis  Placement verified by: chest auscultation and capnometry   Cuff volume (mL): 8  Measured from: teeth  ETT/EBT  to teeth (cm): 22  Number of attempts at approach: 1  Assessment: lips, teeth, and gum same as pre-op and atraumatic intubation

## 2023-08-11 NOTE — OP NOTE
Bella Ordaz  8/11/2023     PREOPERATIVE DIAGNOSIS:   1. Status post left L5-S1 hemilaminotomy, foraminotomy, decompression, 05/15/2013.   2. Mild chronic low back pain.  3. Recurrent left buttock, thigh and leg radiculopathy.  4. Neurogenic claudication.  5. Multilevel lumbar degenerative disc disease.   6. Multilevel lumbar facet arthropathy.  7. Small central disc herniation L2-3.   8. Mild spondylolisthesis, L4-5.   9. Central stenosis L2-3.   10. Severe foraminal stenosis, left L5-S1.       POSTOPERATIVE DIAGNOSIS: same     PROCEDURE PERFORMED:   1.  Anterior lumbar interbody fusion of L5-S1 with instrumentation  2.  Placement of a 13 cm Prevena wound VAC     SURGEON: Sahil Armas DO   COSURGEON: Jose Cruz Devi MD     ANESTHESIA: General.    PREPARATION: Routine.    STAFF: Circulator: Ruby Robert RN; Tanisha Wood RN  Scrub Person: Litzy Larry; Ella Morales  Vendor Representative: Anu Alex Shannon  Assistant: Rajeev Donnelly PA    ESTIMATED BLOOD LOSS: 50 mL    SPECIMENS: None    COMPLICATIONS: None    INDICATIONS: Bella Ordaz is a 68 y.o. male who you are currently following for chronic back pain.  Bella Ordazis scheduled for anterior lumbar interbody fusion of L5-S1 with Dr. Devi on 8/11/2023.  The patient denies any history of DVT, but have a history PE. The indications, risks, and possible complications of the procedure were explained to the patient, who voiced understanding and wished to proceed with surgery.     PROCEDURE IN DETAIL:   The patient was taken to the operating room and placed on the operating table in a supine position. After general anesthesia was obtained, the abdomen was prepped and draped in a sterile manner.  A transverse incision was then made in the left lower quadrant.  Careful dissection was made down through the subcutaneous tissues using the Bovie cautery to ensure hemostasis.  Any crossing veins were ligated with 3-0 silk  suture and hemoclips.  The rectus fascia was identified.  It was incised with the Bovie cautery.  Kocher clamps are placed on each side of the rectus fascia and subfascial planes were established in a cephalad and caudad direction.  Once the subfascial planes were established the attention was then turned to the left rectus muscle.  Blunt mobilization was made of the left rectus muscle including its blood supply medially and to the right.  Once it was fully mobilized the attention was then turned laterally to the peritoneal reflection.  Entrance into the retroperitoneal space was established with the use of a sponge stick and the Bovie cautery.  Continued blunt mobilization was made with my hand using a finger sweeping motion moving the peritoneal contents and sacral fat pad medially and to the right.  Once it was fully mobilized the Zenia-Gregory retractor system was set up.  The retractor blades were set in place.  The left iliac vein was then carefully dissected free and placed safely behind the retractor blade.  The sacral vessels were carefully taken down with hemoclips.  At this point full exposure was established of the L5-S1 disc space.  The next part of the case will be dictated by Dr. Devi.  Upon completion of Dr. Devi's part of the case the wound bed was irrigated with antibiotic saline and hemostasis was observed. VersaWrap was carefully placed over the L5-S1 disc base repair.  The retractor blades were carefully taken out one at a time.  The structures were then placed back in their normal anatomic positions.  The rectus fascia was then closed with a #1 PDS in a running fashion to meet in the midline.  The deep layers were closed with a 2-0 Vicryl in a running fashion.  The subcutaneous layers were closed with a 3-0 Vicryl in a running fashion.  The skin was then reapproximated using a 4-0 Monocryl in a subcuticular fashion.  The wound was then cleaned.  A 13 cm Prevena wound VAC was applied.  The  patient tolerated the procedure well. Sponge and needle counts were correct. The patient was then awakened and extubated in the operating room and taken to the recovery room in good condition.    Sahil Armas,     Date: 8/11/2023 Time: 09:22 CDT

## 2023-08-11 NOTE — THERAPY EVALUATION
Patient Name: Bella Ordaz  : 1955    MRN: [id number]                              Today's Date: 2023       Admit Date: 2023    Visit Dx: No diagnosis found.  Patient Active Problem List   Diagnosis    Primary hypertension    Paroxysmal atrial fibrillation    Type 2 diabetes mellitus without complication, without long-term current use of insulin    Mixed hyperlipidemia    Lumbosacral disc disease    Chronic bilateral low back pain with bilateral sciatica    Lumbago of multiple sites in spine with sciatica    Lumbar radiculopathy     Past Medical History:   Diagnosis Date    Arthritis     Chronic left-sided low back pain     radiates down left leg    Diabetes mellitus     Facet arthropathy, lumbar     Hypertension     Pulmonary embolism 10/2022    pt states 2-3 blood clots in the lung    Sleep apnea     pt states doesn't really use his cpap machine     Past Surgical History:   Procedure Laterality Date    APPENDECTOMY      as a kid    CYST REMOVAL      tailbone    HAND SURGERY Left     nerve/tendon repair    KNEE ARTHROSCOPY Bilateral     x2 left, x1 right    LAPAROSCOPIC CHOLECYSTECTOMY      REPLACEMENT TOTAL KNEE Left 2023    SPINAL CORD DECOMPRESSION        General Information       Row Name 23 1311          OT Time and Intention    Document Type evaluation  s/p Anterior decompression and ALIF L5-S1 on . Plans for 2nd sx on .  -LS     Mode of Treatment occupational therapy  -LS       Row Name 23 1311          General Information    Patient Profile Reviewed yes  -LS     Prior Level of Function independent:;ADL's;all household mobility;cleaning;driving;shopping;community mobility  -LS     Existing Precautions/Restrictions spinal;LSO;brace worn when out of bed;fall;other (see comments)  [place] lower L abdomen  -LS       Row Name 23 1311          Occupational Profile    Environmental Supports and Barriers (Occupational Profile) Walk in shower with grab bars, but no  shower chair. Standard height toilet with no grab bars. Sleeps in a recliner. AD/DME: [place], rwx  -       Row Name 08/11/23 1311          Living Environment    People in Home spouse  -       Row Name 08/11/23 1311          Home Main Entrance    Number of Stairs, Main Entrance none  ramp  -       Row Name 08/11/23 1311          Stairs Within Home, Primary    Number of Stairs, Within Home, Primary none  -       Row Name 08/11/23 1311          Cognition    Orientation Status (Cognition) oriented x 4  -       Row Name 08/11/23 1311          Safety Issues, Functional Mobility    Impairments Affecting Function (Mobility) balance;endurance/activity tolerance;pain  -LS               User Key  (r) = Recorded By, (t) = Taken By, (c) = Cosigned By      Initials Name Provider Type    Ilsa Olson OTR/L Occupational Therapist                     Mobility/ADL's       Row Name 08/11/23 1311          Bed Mobility    Bed Mobility rolling right;sidelying-sit  -LS     Rolling Right [place] (Bed Mobility) contact guard;verbal cues  -LS     Sidelying-Sit [place] (Bed Mobility) minimum assist (75% patient effort);verbal cues  -LS     Assistive Device (Bed Mobility) bed rails  -       Row Name 08/11/23 1311          Transfers    Transfers sit-stand transfer;stand-sit transfer;toilet transfer  -       Row Name 08/11/23 1311          Sit-Stand Transfer    Sit-Stand [place] (Transfers) contact guard;verbal cues  -LS     Comment, (Sit-Stand Transfer) from slightly raised bed  -       Row Name 08/11/23 1311          Stand-Sit Transfer    Stand-Sit [place] (Transfers) contact guard  -       Row Name 08/11/23 1311          Toilet Transfer    Type (Toilet Transfer) sit-stand;stand-sit  -LS     [place] Level (Toilet Transfer) contact guard;verbal cues  -LS     Assistive Device (Toilet Transfer) grab bars/safety frame  -       Row Name 08/11/23 1311          Functional Mobility    Functional Mobility-  Ind. Level contact guard assist  Pt ambulated from bed<>BR utilizing rwx with CGA.  -     Functional Mobility- Device walker, front-wheeled  -       Row Name 08/11/23 1311          Activities of Daily Living    BADL Assessment/Intervention upper body dressing;lower body dressing;toileting  -       Row Name 08/11/23 1311          Upper Body Dressing Assessment/Training    [place] Level (Upper Body Dressing) upper body dressing skills;minimum assist (75% patient effort);other (see comments)  LSO  -       Row Name 08/11/23 1311          Lower Body Dressing Assessment/Training    [place] Level (Lower Body Dressing) lower body dressing skills;moderate assist (50% patient effort)  -LS     Position (Lower Body Dressing) unsupported sitting;supported standing  -       Row Name 08/11/23 1311          Toileting Assessment/Training    [place] Level (Toileting) toileting skills;moderate assist (50% patient effort)  -LS     Position (Toileting) unsupported sitting;supported standing  -               User Key  (r) = Recorded By, (t) = Taken By, (c) = Cosigned By      Initials Name Provider Type     Ilsa Rubio OTR/L Occupational Therapist                   Obj/Interventions       Row Name 08/11/23 1311          Sensory Assessment (Somatosensory)    Sensory Assessment (Somatosensory) UE sensation intact  -[facility] Name 08/11/23 1311          Vision Assessment/Intervention    Visual Impairment/Limitations WFL;corrective lenses full-time  -       Row Name 08/11/23 1311          Range of Motion Comprehensive    General Range of Motion bilateral upper extremity ROM WNL  -       Row Name 08/11/23 1311          Strength Comprehensive (MMT)    Comment, General Manual Muscle Testing (MMT) Assessment No UE strength deficits identified; BUE strength grossly 5/5.  -       Row Name 08/11/23 1311          Balance    Balance Assessment sitting static balance;sitting dynamic balance;standing static  balance;standing dynamic balance  -LS     Static Sitting Balance independent  -LS     Dynamic Sitting Balance standby assist  -LS     Position, Sitting Balance sitting edge of bed;unsupported  -LS     Static Standing Balance contact guard  -LS     Dynamic Standing Balance contact guard  -LS     Position/Device Used, Standing Balance walker, front-wheeled  -LS               User Key  (r) = Recorded By, (t) = Taken By, (c) = Cosigned By      Initials Name Provider Type    LS Ilsa Rubio, OTR/L Occupational Therapist                   Goals/Plan       Row Name 08/11/23 1305          Transfer Goal 1 (OT)    Activity/Assistive Device (Transfer Goal 1, OT) toilet;shower chair  -LS     [place] Level/Cues Needed (Transfer Goal 1, OT) modified independence  -LS     Time Frame (Transfer Goal 1, OT) long term goal (LTG);10 days  -LS     Progress/Outcome (Transfer Goal 1, OT) new goal  -LS       Row Name 08/11/23 130          Dressing Goal 1 (OT)    Activity/Device (Dressing Goal 1, OT) dressing skills, all  -LS     [place]/Cues Needed (Dressing Goal 1, OT) minimum assist (75% or more patient effort)  -LS     Time Frame (Dressing Goal 1, OT) long term goal (LTG);10 days  -LS     Strategies/Barriers (Dressing Goal 1, OT) including LSO  -LS     Progress/Outcome (Dressing Goal 1, OT) new goal  -LS       Row Name 08/11/23 130          Toileting Goal 1 (OT)    Activity/Device (Toileting Goal 1, OT) toileting skills, all  -LS     [place] Level/Cues Needed (Toileting Goal 1, OT) modified independence  -LS     Time Frame (Toileting Goal 1, OT) long term goal (LTG);10 days  -LS     Progress/Outcome (Toileting Goal 1, OT) new goal  -LS       Row Name 08/11/23 1305          Therapy Assessment/Plan (OT)    Planned Therapy Interventions (OT) activity tolerance training;functional balance retraining;occupation/activity based interventions;ROM/therapeutic exercise;patient/caregiver education/training;adaptive equipment  training;BADL retraining;transfer/mobility retraining  -               User Key  (r) = Recorded By, (t) = Taken By, (c) = Cosigned By      Initials Name Provider Type     Ilsa Rubio OTR/L Occupational Therapist                   Clinical Impression       Row Name 08/11/23 1311          Pain Assessment    Pretreatment Pain Rating 7/10  -LS     Posttreatment Pain Rating 7/10  -LS     Pain Location - back  -LS     Pain Intervention(s) Repositioned;Ambulation/increased activity  -       Row Name 08/11/23 1311          Plan of Care Review    Plan of Care Reviewed With patient;spouse  -     Progress no change  -     Outcome Evaluation OT eval completed. Pt in fowlers upon therapist arrival; A&Ox4; 7/10 pain reported in back; Pt's wife also present. Pt reports Mod I-I with all BADLs including fxl ambulation at [facility]. Today, Pt was educated on spinal precautions; Pt verbalized understanding. Pt performed log roll onto R side utilizing bedrail with CGA and verbal cues; sidelying>sit utilizing bedrail with Min A and verbal cues. Pt fit for LSO while seated EOB and educated on donning/doffing of LSO and LSO wear schedule; Pt verbalized understanding and required Mod A to correct ebenezer LSO. Pt dependent for donning B socks while seated EOB. Pt performed sit<>stand from slightly raised bed with CGA and verbal cues to push from bed. Pt ambulated from bed<>BR utilizing rwx with CGA. Pt performed sit<>stand toilet transfer utilizing grab bar and sink with CGA; Pt required Min A for clothing management. BUE strength WNL. Skilled OT intervention indicated in order to address deficits in fxl mobility, fxl activity tolerance, balance, and use of adaptive techniques/equipment during performance of BADLs. Discharge recommendation pending second surgery.  -       Row Name 08/11/23 1311          Therapy Assessment/Plan (OT)    Rehab Potential (OT) good, to achieve stated therapy goals  -     Criteria for Skilled Therapeutic  Interventions Met (OT) yes;skilled treatment is necessary  -LS     Therapy Frequency (OT) 5 times/wk  -       Row Name 08/11/23 1311          Positioning and Restraints    Pre-Treatment Position in bed  -LS     Post Treatment Position bed  -LS     In Bed fowlers;call light within reach;encouraged to call for assist;exit alarm on;side rails up x3;with family/caregiver;SCD pump applied  -LS               User Key  (r) = Recorded By, (t) = Taken By, (c) = Cosigned By      Initials Name Provider Type    Ilsa Olson OTR/L Occupational Therapist                   Outcome Measures       Row Name 08/11/23 1309          How much help from another is currently needed...    Putting on and taking off regular lower body clothing? 2  -LS     Bathing (including washing, rinsing, and drying) 2  -LS     Toileting (which includes using toilet bed pan or urinal) 2  -LS     Putting on and taking off regular upper body clothing 3  -LS     Taking care of personal grooming (such as brushing teeth) 4  -LS     Eating meals 4  -LS     AM-PAC 6 Clicks Score (OT) 17  -LS       Row Name 08/11/23 1223          How much help from another person do you currently need...    Turning from your back to your side while in flat bed without using bedrails? 3  -KS     Moving from lying on back to sitting on the side of a flat bed without bedrails? 3  -KS     Moving to and from a bed to a chair (including a wheelchair)? 3  -KS     Standing up from a chair using your arms (e.g., wheelchair, bedside chair)? 3  -KS     Climbing 3-5 steps with a railing? 3  -KS     To walk in hospital room? 3  -KS     AM-PAC 6 Clicks Score (PT) 18  -KS       Row Name 08/11/23 1309          Functional Assessment    Outcome Measure Options AM-PAC 6 Clicks Daily Activity (OT)  -               User Key  (r) = Recorded By, (t) = Taken By, (c) = Cosigned By      Initials Name Provider Type    Gail Batista RN Registered Nurse    Ilsa Olson OTR/L  Occupational Therapist                    Occupational Therapy Education       Title: PT OT SLP Therapies (In Progress)       Topic: Occupational Therapy (In Progress)       Point: ADL training (Done)       Description:   Instruct learner(s) on proper safety adaptation and remediation techniques during self care or transfers.   Instruct in proper use of assistive devices.                  Learning Progress Summary             Patient Acceptance, E, VU by  at 8/11/2023 1509                         Point: Home exercise program (Not Started)       Description:   Instruct learner(s) on appropriate technique for monitoring, assisting and/or progressing therapeutic exercises/activities.                  Learner Progress:  Not documented in this visit.              Point: Precautions (Done)       Description:   Instruct learner(s) on prescribed precautions during self-care and functional transfers.                  Learning Progress Summary             Patient Acceptance, E, VU by  at 8/11/2023 1509                         Point: Body mechanics (Done)       Description:   Instruct learner(s) on proper positioning and spine alignment during self-care, functional mobility activities and/or exercises.                  Learning Progress Summary             Patient Acceptance, E, VU by  at 8/11/2023 1509                                         User Key       Initials Effective Dates Name Provider Type Discipline     06/20/22 -  Ilsa Rubio, OTR/L Occupational Therapist OT                  OT Recommendation and Plan  Planned Therapy Interventions (OT): activity tolerance training, functional balance retraining, occupation/activity based interventions, ROM/therapeutic exercise, patient/caregiver education/training, adaptive equipment training, BADL retraining, transfer/mobility retraining  Therapy Frequency (OT): 5 times/wk  Plan of Care Review  Plan of Care Reviewed With: patient, spouse  Progress: no change  Outcome  Evaluation: OT eval completed. Pt in fowlers upon therapist arrival; A&Ox4; 7/10 pain reported in back; Pt's wife also present. Pt reports Mod I-I with all BADLs including fxl ambulation at [facility]. Today, Pt was educated on spinal precautions; Pt verbalized understanding. Pt performed log roll onto R side utilizing bedrail with CGA and verbal cues; sidelying>sit utilizing bedrail with Min A and verbal cues. Pt fit for LSO while seated EOB and educated on donning/doffing of LSO and LSO wear schedule; Pt verbalized understanding and required Mod A to correct ebenezer LSO. Pt dependent for donning B socks while seated EOB. Pt performed sit<>stand from slightly raised bed with CGA and verbal cues to push from bed. Pt ambulated from bed<>BR utilizing rwx with CGA. Pt performed sit<>stand toilet transfer utilizing grab bar and sink with CGA; Pt required Min A for clothing management. BUE strength WNL. Skilled OT intervention indicated in order to address deficits in fxl mobility, fxl activity tolerance, balance, and use of adaptive techniques/equipment during performance of BADLs. Discharge recommendation pending second surgery.     Time Calculation:         Time Calculation- OT       Row Name 08/11/23 1309             Time Calculation- OT    OT Start Time 1309  +10 minutes chart review  -      OT Stop Time 1409  -      OT Time Calculation (min) 60 min  -      Total Timed Code Minutes- OT 10 minute(s)  -      OT Non-Billable Time (min) 60 min  -      OT Received On 08/11/23  -      OT Goal Re-Cert Due Date 08/21/23  -                User Key  (r) = Recorded By, (t) = Taken By, (c) = Cosigned By      Initials Name Provider Type     Ilsa Rubio OTR/L Occupational Therapist                  Therapy Charges for Today       Code Description Service Date Service Provider Modifiers Qty    [id number]  OT EVAL LOW COMPLEXITY 4 8/11/2023 Ilsa Rubio OTR/L GO 1    [id number]  OT SELF CARE/MGMT/TRAIN EA 15 MIN  8/11/2023 Ilsa Rubio, OTR/L GO 1                 Ilsa Rubio, OTR/L  8/11/2023

## 2023-08-11 NOTE — ANESTHESIA POSTPROCEDURE EVALUATION
Patient: Bella Ordaz    Procedure Summary       Date: 08/11/23 Room / Location:  [facility] OR  /  [facility]    Anesthesia Start: 0713 Anesthesia Stop: 0925    Procedures:       ANTERIOR DECOMPRESSION, ANTERIOR LUMBAR INTERBODY FUSION WITH INSTRUMENTATION L5-S1 (Spine Lumbar)      ANTERIOR LUMBAR EXPOSURE (Abdomen) Diagnosis: (M54.16)    Surgeons: GRETCHEN Devi MD; Sahil Armas DO Provider: ROSALIA Batista CRNA    Anesthesia Type: general ASA Status: 2            Anesthesia Type: general    Vitals  Vitals Value Taken Time   /73 08/11/23 1155   Temp 97.2 øF (36.2 øC) 08/11/23 1140   Pulse 92 08/11/23 1155   Resp 16 08/11/23 1155   SpO2 96 % 08/11/23 1155           Post Anesthesia Care and Evaluation    Patient location during evaluation: PACU  Patient participation: complete - patient participated  Level of consciousness: awake and alert  Pain management: adequate    Airway patency: patent  Anesthetic complications: No anesthetic complications  PONV Status: none  Cardiovascular status: acceptable and hemodynamically stable  Respiratory status: acceptable  Hydration status: acceptable    Comments: Blood pressure 115/59, pulse 63, temperature 97.5 øF (36.4 øC), temperature source Oral, resp. rate 15, weight 119 kg (263 lb 3.7 oz), SpO2 95 %.    Patient discharged from PACU based upon Robb score. Please see RN notes for further details

## 2023-08-12 LAB
ANION GAP SERPL CALCULATED.3IONS-SCNC: 10 MMOL/L (ref 5–15)
BASOPHILS # BLD AUTO: 0.05 10*3/MM3 (ref 0–0.2)
BASOPHILS NFR BLD AUTO: 0.4 % (ref 0–1.5)
BUN SERPL-MCNC: 11 MG/DL (ref 8–23)
BUN/CREAT SERPL: 15.5 (ref 7–25)
CALCIUM SPEC-SCNC: 9.3 MG/DL (ref 8.6–10.5)
CHLORIDE SERPL-SCNC: 103 MMOL/L (ref 98–107)
CO2 SERPL-SCNC: 29 MMOL/L (ref 22–29)
CREAT SERPL-MCNC: 0.71 MG/DL (ref 0.76–1.27)
DEPRECATED RDW RBC AUTO: 48.1 FL (ref 37–54)
EGFRCR SERPLBLD CKD-EPI 2021: 99.9 ML/MIN/1.73
EOSINOPHIL # BLD AUTO: 0.06 10*3/MM3 (ref 0–0.4)
EOSINOPHIL NFR BLD AUTO: 0.4 % (ref 0.3–6.2)
ERYTHROCYTE [DISTWIDTH] IN BLOOD BY AUTOMATED COUNT: 15.6 % (ref 12.3–15.4)
GLUCOSE BLDC GLUCOMTR-MCNC: 160 MG/DL (ref 70–130)
GLUCOSE BLDC GLUCOMTR-MCNC: 195 MG/DL (ref 70–130)
GLUCOSE BLDC GLUCOMTR-MCNC: 254 MG/DL (ref 70–130)
GLUCOSE SERPL-MCNC: 123 MG/DL (ref 65–99)
HBA1C MFR BLD: 6.8 % (ref 4.8–5.6)
HCT VFR BLD AUTO: 47 % (ref 37.5–51)
HGB BLD-MCNC: 13.3 G/DL (ref 13–17.7)
IMM GRANULOCYTES # BLD AUTO: 0.05 10*3/MM3 (ref 0–0.05)
IMM GRANULOCYTES NFR BLD AUTO: 0.4 % (ref 0–0.5)
LYMPHOCYTES # BLD AUTO: 3.08 10*3/MM3 (ref 0.7–3.1)
LYMPHOCYTES NFR BLD AUTO: 23 % (ref 19.6–45.3)
MCH RBC QN AUTO: 24 PG (ref 26.6–33)
MCHC RBC AUTO-ENTMCNC: 28.3 G/DL (ref 31.5–35.7)
MCV RBC AUTO: 84.8 FL (ref 79–97)
MONOCYTES # BLD AUTO: 1.33 10*3/MM3 (ref 0.1–0.9)
MONOCYTES NFR BLD AUTO: 9.9 % (ref 5–12)
NEUTROPHILS NFR BLD AUTO: 65.9 % (ref 42.7–76)
NEUTROPHILS NFR BLD AUTO: 8.84 10*3/MM3 (ref 1.7–7)
NRBC BLD AUTO-RTO: 0 /100 WBC (ref 0–0.2)
PLATELET # BLD AUTO: 161 10*3/MM3 (ref 140–450)
PMV BLD AUTO: 12.7 FL (ref 6–12)
POTASSIUM SERPL-SCNC: 4.1 MMOL/L (ref 3.5–5.2)
QT INTERVAL: 480 MS
QTC INTERVAL: 510 MS
RBC # BLD AUTO: 5.54 10*6/MM3 (ref 4.14–5.8)
SODIUM SERPL-SCNC: 142 MMOL/L (ref 136–145)
WBC NRBC COR # BLD: 13.41 10*3/MM3 (ref 3.4–10.8)

## 2023-08-12 PROCEDURE — 80048 BASIC METABOLIC PNL TOTAL CA: CPT | Performed by: ORTHOPAEDIC SURGERY

## 2023-08-12 PROCEDURE — 63710000001 INSULIN LISPRO (HUMAN) PER 5 UNITS: Performed by: FAMILY MEDICINE

## 2023-08-12 PROCEDURE — 83036 HEMOGLOBIN GLYCOSYLATED A1C: CPT | Performed by: FAMILY MEDICINE

## 2023-08-12 PROCEDURE — 97116 GAIT TRAINING THERAPY: CPT

## 2023-08-12 PROCEDURE — 85025 COMPLETE CBC W/AUTO DIFF WBC: CPT | Performed by: ORTHOPAEDIC SURGERY

## 2023-08-12 PROCEDURE — 82948 REAGENT STRIP/BLOOD GLUCOSE: CPT

## 2023-08-12 RX ORDER — INSULIN LISPRO 100 [IU]/ML
2-9 INJECTION, SOLUTION INTRAVENOUS; SUBCUTANEOUS
Status: DISCONTINUED | OUTPATIENT
Start: 2023-08-12 | End: 2023-08-15 | Stop reason: HOSPADM

## 2023-08-12 RX ORDER — DEXTROSE MONOHYDRATE 25 G/50ML
25 INJECTION, SOLUTION INTRAVENOUS
Status: DISCONTINUED | OUTPATIENT
Start: 2023-08-12 | End: 2023-08-15 | Stop reason: HOSPADM

## 2023-08-12 RX ORDER — NICOTINE POLACRILEX 4 MG
15 LOZENGE BUCCAL
Status: DISCONTINUED | OUTPATIENT
Start: 2023-08-12 | End: 2023-08-15 | Stop reason: HOSPADM

## 2023-08-12 RX ADMIN — PROPAFENONE HYDROCHLORIDE 300 MG: 150 TABLET, FILM COATED ORAL at 21:07

## 2023-08-12 RX ADMIN — OXYCODONE AND ACETAMINOPHEN 1 TABLET: 7.5; 325 TABLET ORAL at 14:51

## 2023-08-12 RX ADMIN — METFORMIN HCL 1000 MG: 500 TABLET ORAL at 08:16

## 2023-08-12 RX ADMIN — INSULIN LISPRO 2 UNITS: 100 INJECTION, SOLUTION INTRAVENOUS; SUBCUTANEOUS at 21:18

## 2023-08-12 RX ADMIN — OXYCODONE AND ACETAMINOPHEN 1 TABLET: 7.5; 325 TABLET ORAL at 04:28

## 2023-08-12 RX ADMIN — ATORVASTATIN CALCIUM 40 MG: 40 TABLET, FILM COATED ORAL at 08:16

## 2023-08-12 RX ADMIN — METFORMIN HCL 1000 MG: 500 TABLET ORAL at 17:27

## 2023-08-12 RX ADMIN — PROPAFENONE HYDROCHLORIDE 300 MG: 150 TABLET, FILM COATED ORAL at 14:49

## 2023-08-12 RX ADMIN — EMPAGLIFLOZIN 25 MG: 25 TABLET, FILM COATED ORAL at 08:16

## 2023-08-12 RX ADMIN — DOCUSATE SODIUM 50 MG AND SENNOSIDES 8.6 MG 1 TABLET: 8.6; 5 TABLET, FILM COATED ORAL at 21:06

## 2023-08-12 RX ADMIN — DOCUSATE SODIUM 50 MG AND SENNOSIDES 8.6 MG 1 TABLET: 8.6; 5 TABLET, FILM COATED ORAL at 08:15

## 2023-08-12 RX ADMIN — OXYCODONE AND ACETAMINOPHEN 1 TABLET: 7.5; 325 TABLET ORAL at 21:06

## 2023-08-12 RX ADMIN — Medication 3 ML: at 08:16

## 2023-08-12 RX ADMIN — OXYCODONE AND ACETAMINOPHEN 1 TABLET: 7.5; 325 TABLET ORAL at 09:59

## 2023-08-12 RX ADMIN — LOSARTAN POTASSIUM 100 MG: 50 TABLET, FILM COATED ORAL at 08:15

## 2023-08-12 RX ADMIN — INSULIN LISPRO 2 UNITS: 100 INJECTION, SOLUTION INTRAVENOUS; SUBCUTANEOUS at 12:04

## 2023-08-12 RX ADMIN — HYDROCHLOROTHIAZIDE 12.5 MG: 25 TABLET ORAL at 08:15

## 2023-08-12 RX ADMIN — PROPAFENONE HYDROCHLORIDE 300 MG: 150 TABLET, FILM COATED ORAL at 05:04

## 2023-08-12 RX ADMIN — INSULIN LISPRO 6 UNITS: 100 INJECTION, SOLUTION INTRAVENOUS; SUBCUTANEOUS at 17:28

## 2023-08-12 NOTE — THERAPY TREATMENT NOTE
Acute Care - Physical Therapy Treatment Note  [facility]     Patient Name: Bella Ordaz  : 1955  MRN: [id number]  Today's Date: 2023      Visit Dx:     ICD-10-CM ICD-9-CM   1. Impaired mobility [Z74.09 (ICD-10-CM)]  Z74.09 799.89     Patient Active Problem List   Diagnosis    Primary hypertension    Paroxysmal atrial fibrillation    Type 2 diabetes mellitus without complication, without long-term current use of insulin    Mixed hyperlipidemia    Lumbosacral disc disease    Chronic bilateral low back pain with bilateral sciatica    Lumbago of multiple sites in spine with sciatica    Lumbar radiculopathy     Past Medical History:   Diagnosis Date    Arthritis     Chronic left-sided low back pain     radiates down left leg    Diabetes mellitus     Facet arthropathy, lumbar     Hypertension     Pulmonary embolism 10/2022    pt states 2-3 blood clots in the lung    Sleep apnea     pt states doesn't really use his cpap machine     Past Surgical History:   Procedure Laterality Date    APPENDECTOMY      as a kid    CYST REMOVAL      tailbone    HAND SURGERY Left     nerve/tendon repair    KNEE ARTHROSCOPY Bilateral     x2 left, x1 right    LAPAROSCOPIC CHOLECYSTECTOMY      REPLACEMENT TOTAL KNEE Left 2023    SPINAL CORD DECOMPRESSION       PT Assessment (last 12 hours)       PT Evaluation and Treatment       Row Name 23 1312 23 0822       Physical Therapy Time and Intention    Subjective Information complains of;pain  -KENYA complains of;pain  -AB    Document Type therapy note (daily note)  -KENYA therapy note (daily note)  -AB    Mode of Treatment physical therapy  -KENYA physical therapy  -AB      Row Name 23 1312 23 0822       General Information    Existing Precautions/Restrictions spinal;LSO;brace worn when out of bed;fall  -KENYA spinal;LSO;brace worn when out of bed;fall;other (see comments)  Prevena  -AB    Equipment Ordered for Patient --  prevena, abd incision  -KENYA --      Row Name  08/12/23 1312 08/12/23 0822       Pain    Pretreatment Pain Rating 6/10  -KENYA 6/10  -AB    Posttreatment Pain Rating 6/10  -KENYA 6/10  -AB    Pain Location incisional  -KENYA --    Pain Location - abdomen  -KENYA abdomen  -AB    Pain Intervention(s) Ambulation/increased activity  -KENYA --      Row Name 08/12/23 1312 08/12/23 0822       Bed Mobility    Bed Mobility sit-sidelying  -KENYA --    Rolling Right [place] (Bed Mobility) -- verbal cues;standby assist  -AB    Sidelying-Sit [place] (Bed Mobility) standby assist  -KENYA standby assist  -AB    Sit-Sidelying [place] (Bed Mobility) standby assist  -KENYA --    Assistive Device (Bed Mobility) bed rails  -KENYA bed rails  -AB    Comment, (Bed Mobility) slow and guarded  -KENYA --      Row Name 08/12/23 1312 08/12/23 0822       Sit-Stand Transfer    Sit-Stand [place] (Transfers) verbal cues;contact guard  -KENYA contact guard;verbal cues  -AB      Row Name 08/12/23 1312          Stand-Sit Transfer    Stand-Sit [place] (Transfers) verbal cues;contact guard  -KENYA       Row Name 08/12/23 1312 08/12/23 0822       Gait/Stairs (Locomotion)    [place] Level (Gait) verbal cues;contact guard  -KENYA contact guard  -AB    Assistive Device (Gait) walker, front-wheeled  -KENYA walker, front-wheeled  -AB    Distance in Feet (Gait) 320  -KENYA 300'  -AB    Bilateral Gait Deviations forward flexed posture  -KENYA forward flexed posture  -AB      Row Name             Wound 08/11/23 0736 Left lower abdomen Incision    Wound - Properties Group Placement Date: 08/11/23 -SA Placement Time: 0736 -SA Present on Hospital Admission: N  -SA Side: Left  -SA Orientation: lower  -SA Location: abdomen  -SA Primary Wound Type: Incision  -SA    Retired Wound - Properties Group Placement Date: 08/11/23 -SA Placement Time: 0736 -SA Present on Hospital Admission: N  -SA Side: Left  -SA Orientation: lower  -SA Location: abdomen  -SA Primary Wound Type: Incision  -SA    Retired Wound - Properties Group Date  first assessed: 08/11/23 -SA Time first assessed: 0736 -SA Present on Hospital Admission: N  -SA Side: Left  -SA Location: abdomen  -SA Primary Wound Type: Incision  -SA      Row Name             NPWT (Negative Pressure Wound Therapy) 08/11/23 0903 left lower abdomen    NPWT (Negative Pressure Wound Therapy) - Properties Group Placement Date: 08/11/23 -SA Placement Time: 0903 -SA Location: left lower abdomen  -SA    Retired NPWT (Negative Pressure Wound Therapy) - Properties Group Placement Date: 08/11/23 -SA Placement Time: 0903 -SA Location: left lower abdomen  -SA    Retired NPWT (Negative Pressure Wound Therapy) - Properties Group Placement Date: 08/11/23 -SA Placement Time: 0903 -SA Location: left lower abdomen  -SA      Row Name 08/12/23 1312 08/12/23 0822       Positioning and Restraints    Pre-Treatment Position in bed  -KENYA in bed  -AB    Post Treatment Position bed  -KENYA bed  -AB    In Bed fowlers;call light within reach;encouraged to call for assist;side rails up x2  -KENYA fowlers;call light within reach  -AB              User Key  (r) = Recorded By, (t) = Taken By, (c) = Cosigned By      Initials Name Provider Type    AB Bella Torres, PTA Physical Therapist Assistant    Luis Puente PTA Physical Therapist Assistant    Ruby Jackson, RN Registered Nurse                    Physical Therapy Education       Title: PT OT SLP Therapies (In Progress)       Topic: Physical Therapy (In Progress)       Point: Mobility training (Done)       Learning Progress Summary             Patient Acceptance, E, VU by MS at 8/11/2023 2173    Comment: role of PT in his care, spinal restrictions, use of LSO                         Point: Home exercise program (Not Started)       Learner Progress:  Not documented in this visit.              Point: Body mechanics (Not Started)       Learner Progress:  Not documented in this visit.              Point: Precautions (Done)       Learning Progress Summary              Patient Acceptance, E, VU by MS at 8/11/2023 1514    Comment: role of PT in his care, spinal restrictions, use of LSO                                         User Key       Initials Effective Dates Name Provider Type Discipline    MS 07/11/23 -  Miranda Edmond, PT, DPT, NCS Physical Therapist PT                  PT Recommendation and Plan             Time Calculation:    PT Charges       Row Name 08/12/23 1312 08/12/23 0822          Time Calculation    Start Time 1312  -KENYA 0822  -AB     Stop Time 1330  -KENYA 0851  -AB     Time Calculation (min) 18 min  -KENYA 29 min  -AB     PT Received On 08/12/23  -KENYA 08/12/23  -AB        Time Calculation- PT    Total Timed Code Minutes- PT 18 minute(s)  -KENYA 29 minute(s)  -AB        Timed Charges    58272 - Gait Training Minutes  18  -KENYA --        Total Minutes    Timed Charges Total Minutes 18  -KENYA --      Total Minutes 18  -KENYA --               User Key  (r) = Recorded By, (t) = Taken By, (c) = Cosigned By      Initials Name Provider Type    AB Bella Torres PTA Physical Therapist Assistant    Luis Puente, RALF Physical Therapist Assistant                  Therapy Charges for Today       Code Description Service Date Service Provider Modifiers Qty    50825276456 HC GAIT TRAINING EA 15 MIN 8/12/2023 Luis Sands, RALF GP 1            PT G-Codes  Outcome Measure Options: AM-PAC 6 Clicks Basic Mobility (PT)  AM-PAC 6 Clicks Score (PT): 18  AM-PAC 6 Clicks Score (OT): 17    Luis Sands PTA  8/12/2023

## 2023-08-12 NOTE — PLAN OF CARE
Goal Outcome Evaluation:  Plan of Care Reviewed With: patient        Progress: no change   Pt is a&ox4. Abd incision is CDI with prevena wound vac functioning well. Pt is up with SBA ambulating to bathroom-voiding. LSO when OOB. BG tx and monitored per order. PRN Percocet given for c/o pain with relief noted.

## 2023-08-12 NOTE — CONSULTS
Consult  Patient:  Bella Ordaz  MRN: [id number]    CHIEF COMPLAINT: Low back pain    History Obtained From: the patient   PCP: Geovanny Olivarez MD    HISTORY OF PRESENT ILLNESS:   The patient is a 68 y.o. male who presents with ongoing chronic low back pain lumbar radiculopathy who presents for surgical intervention after failing max outpatient management.  We are consulted for medical management in the perioperative period.  The patient has underlying type 2 diabetes mellitus which he reports is not entirely well controlled on metformin and Jardiance.  Also has hypercholesterolemia and hypertension.  Prior history of pulmonary embolism and atrial fibrillation as well for which he takes Eliquis at home.  This has been held for surgery.'s morning, he is resting comfortably in bed but does complain of back pain.  Also having some abdominal discomfort from his anterior incision.    REVIEW OF SYSTEMS:    Review of Systems   Constitutional:  Negative for chills and fever.   Respiratory:  Negative for shortness of breath.    Cardiovascular:  Negative for chest pain.   Gastrointestinal:  Positive for abdominal pain.   Musculoskeletal:  Positive for back pain.        Past Medical History:  Past Medical History:   Diagnosis Date    Arthritis     Chronic left-sided low back pain     radiates down left leg    Diabetes mellitus     Facet arthropathy, lumbar     Hypertension     Pulmonary embolism 10/2022    pt states 2-3 blood clots in the lung    Sleep apnea     pt states doesn't really use his cpap machine       Past Surgical History:  Past Surgical History:   Procedure Laterality Date    APPENDECTOMY      as a kid    CYST REMOVAL      tailbone    HAND SURGERY Left     nerve/tendon repair    KNEE ARTHROSCOPY Bilateral     x2 left, x1 right    LAPAROSCOPIC CHOLECYSTECTOMY      REPLACEMENT TOTAL KNEE Left 05/2023    SPINAL CORD DECOMPRESSION  2013       Medications Prior to Admission:    Medications Prior to  "Admission   Medication Sig Dispense Refill Last Dose    atorvastatin (LIPITOR) 40 MG tablet Take 1 tablet by mouth Daily.   8/10/2023 at 1800    empagliflozin (JARDIANCE) 25 MG tablet tablet Take 1 tablet by mouth Daily.   8/10/2023 at 0800    metFORMIN (GLUCOPHAGE) 500 MG tablet Take 2 tablets by mouth 2 (Two) Times a Day With Meals.   8/10/2023 at 1800    olmesartan-hydrochlorothiazide (BENICAR HCT) 40-12.5 MG per tablet Take 1 tablet by mouth Daily.   8/10/2023 at 0800    oxyCODONE-acetaminophen (PERCOCET) 7.5-325 MG per tablet Take 1 tablet by mouth Every 12 (Twelve) Hours As Needed for Severe Pain.   Past Week    propafenone (RYTHMOL) 300 MG tablet Take 1 tablet by mouth Every 8 (Eight) Hours.   8/10/2023 at 1800    apixaban (ELIQUIS) 5 MG tablet tablet Take 1 tablet by mouth 2 (Two) Times a Day.   2023 at 1800       Allergies:  Patient has no known allergies.    Social History:   Social History     Socioeconomic History    Marital status:    Tobacco Use    Smoking status: Former     Packs/day: 2.00     Years: 5.00     Pack years: 10.00     Types: Cigarettes     Quit date:      Years since quittin.6    Smokeless tobacco: Never   Vaping Use    Vaping Use: Never used   Substance and Sexual Activity    Alcohol use: Not Currently    Drug use: Not Currently    Sexual activity: Defer       Family History:   Family History   Problem Relation Age of Onset    Heart failure Mother     Heart failure Father            Physical Exam:    Vitals: /48 (BP Location: Right arm, Patient Position: Lying)   Pulse 76   Temp 97.9 øF (36.6 øC) (Oral)   Resp 16   Ht 185 cm (72.84\")   Wt 119 kg (262 lb 5.6 oz)   SpO2 95%   BMI 34.77 kg/mý   Physical Exam  Constitutional:       Appearance: He is well-developed.   HENT:      Head: Normocephalic and atraumatic.   Eyes:      Conjunctiva/sclera: Conjunctivae normal.      Pupils: Pupils are equal, round, and reactive to light.   Cardiovascular:      Rate and " Rhythm: Normal rate and regular rhythm.      Heart sounds: Normal heart sounds. No murmur heard.    No friction rub.   Pulmonary:      Effort: Pulmonary effort is normal. No respiratory distress.      Breath sounds: Normal breath sounds. No wheezing or rales.   Abdominal:      General: Bowel sounds are normal. There is no distension.      Palpations: Abdomen is soft.      Tenderness: There is abdominal tenderness.   Musculoskeletal:         General: Normal range of motion.      Cervical back: Normal range of motion.   Skin:     Capillary Refill: Capillary refill takes less than 2 seconds.   Neurological:      Mental Status: He is alert and oriented to person, place, and time.      Cranial Nerves: No cranial nerve deficit.   Psychiatric:         Behavior: Behavior normal.         Lab Results (last 24 hours)       Procedure Component Value Units Date/Time    Basic Metabolic Panel [[id number]]  (Abnormal) Collected: 08/12/23 0455    Specimen: Blood Updated: 08/12/23 0621     Glucose 123 mg/dL      BUN 11 mg/dL      Creatinine 0.71 mg/dL      Sodium 142 mmol/L      Potassium 4.1 mmol/L      Chloride 103 mmol/L      CO2 29.0 mmol/L      Calcium 9.3 mg/dL      BUN/Creatinine Ratio 15.5     Anion Gap 10.0 mmol/L      eGFR 99.9 mL/min/1.73     Narrative:      GFR Normal >60  Chronic Kidney Disease <60  Kidney Failure <15      CBC & Differential [[id number]]  (Abnormal) Collected: 08/12/23 0455    Specimen: Blood Updated: 08/12/23 0559    Narrative:      The following orders were created for panel order CBC & Differential.  Procedure                               Abnormality         Status                     ---------                               -----------         ------                     CBC Auto Differential[[id number]]        Abnormal            Final result                 Please view results for these tests on the individual orders.    CBC Auto Differential [[id number]]  (Abnormal) Collected: 08/12/23 0455     Specimen: Blood Updated: 08/12/23 0559     WBC 13.41 10*3/mm3      RBC 5.54 10*6/mm3      Hemoglobin 13.3 g/dL      Hematocrit 47.0 %      MCV 84.8 fL      MCH 24.0 pg      MCHC 28.3 g/dL      RDW 15.6 %      RDW-SD 48.1 fl      MPV 12.7 fL      Platelets 161 10*3/mm3      Neutrophil % 65.9 %      Lymphocyte % 23.0 %      Monocyte % 9.9 %      Eosinophil % 0.4 %      Basophil % 0.4 %      Immature Grans % 0.4 %      Neutrophils, Absolute 8.84 10*3/mm3      Lymphocytes, Absolute 3.08 10*3/mm3      Monocytes, Absolute 1.33 10*3/mm3      Eosinophils, Absolute 0.06 10*3/mm3      Basophils, Absolute 0.05 10*3/mm3      Immature Grans, Absolute 0.05 10*3/mm3      nRBC 0.0 /100 WBC              -----------------------------------------------------------------    Radiology:     XR Abdomen 1 View    Result Date: 8/11/2023  EXAMINATION:  XR ABDOMEN 1 VW-  8/11/2023 9:14 AM CDT  HISTORY: No instrument count in the operating room.  COMPARISON: No comparison study.  TECHNIQUE: Supine abdomen centered over the pelvis. The upper abdomen is not included.  FINDINGS:   There is an interbody device at the lumbosacral junction. There are a few vascular clips in this region. There is a Jackson catheter in the bladder. No other foreign body is seen. There are degenerative changes of the spine. There is enthesopathy of the pelvis. The bowel gas pattern is normal.       1. Status post lumbar fusion, as described. 2. Postsurgical changes and Jackson catheter, as discussed. No other foreign body is seen.   This report was finalized on 08/11/2023 09:24 by Dr. Kamran Batista MD.    FL C Arm During Surgery    Result Date: 8/11/2023  This procedure was auto-finalized with no dictation required.    XR Spine Lumbar AP & Lateral    Result Date: 8/11/2023  EXAMINATION:  XR SPINE LUMBAR AP AND LATERAL-  8/11/2023 7:25 AM CDT  HISTORY: Anterior lumbar interbody fusion.  COMPARISON: No comparison study.  NUMBER OF IMAGES: 5  FLUOROSCOPY TIME: 13 seconds.   FLUOROSCOPIC DOSE: 13 mGy.  FINDINGS: Fluoroscopic images demonstrate placement of an anterior lumbar interbody fusion device at L5-S1. There is an anterior screw plate. Bone detail is limited.       Operative images, as described. This report was finalized on 08/11/2023 09:25 by Dr. Kamran Batista MD.      Assessment and Plan   Lumbago with sciatica  Status post ALIF L5/S1 on 8/11/2023.  Management per orthopedic surgery.    Paroxysmal atrial fibrillation.    Would benefit from resuming his home Eliquis.  Will need to be cleared from surgical standpoint for this    Type 2 diabetes mellitus  Reports poor control with prior A1c of 8 but thinks it may be better now.  We will recheck A1c.  Resume home Jardiance and metformin.  Placed on sliding scale insulin.    Primary hypertension  Resume home medication monitor blood pressure.    Disposition: Medically stable      Lumbago of multiple sites in spine with sciatica    Primary hypertension    Paroxysmal atrial fibrillation    Type 2 diabetes mellitus without complication, without long-term current use of insulin    Mixed hyperlipidemia    Lumbosacral disc disease    Chronic bilateral low back pain with bilateral sciatica    Lumbar radiculopathy      Hakeem Louise MD 8/12/2023 10:29 CDT

## 2023-08-12 NOTE — PROGRESS NOTES
[facility]  Spine Surgery  RUSLAN Loving   Progress Note        Subjective/Overnight Events:  Stable this morning, pain is controlled, patient notes his left leg pain is significantly improved, he has no acute complaint this morning.    Vitals  Vitals:    08/11/23 1607 08/11/23 1943 08/12/23 0433 08/12/23 0700   BP: 115/59 110/50 115/47 104/48   BP Location: Right arm Right arm Right arm Right arm   Patient Position: Lying Lying Lying Lying   Pulse: 63 88 81 76   Resp: 15 16 16 16   Temp: 97.5 øF (36.4 øC) 97.6 øF (36.4 øC) 98.5 øF (36.9 øC) 97.9 øF (36.6 øC)   TempSrc: Oral Oral Oral Oral   SpO2: 95% 98% 96% 95%   Weight:       Height:           Current Facility-Administered Medications   Medication Dose Route Frequency Provider Last Rate Last Admin    acetaminophen (TYLENOL) tablet 650 mg  650 mg Oral Q4H PRN GRETCHEN Devi MD        atorvastatin (LIPITOR) tablet 40 mg  40 mg Oral Daily GRETCHEN Devi MD   40 mg at 08/12/23 0816    cyclobenzaprine (FLEXERIL) tablet 10 mg  10 mg Oral TID PRN GRETCHEN Devi MD        empagliflozin (JARDIANCE) tablet 25 mg  25 mg Oral Daily GRETCHEN Devi MD   25 mg at 08/12/23 0816    losartan (COZAAR) tablet 100 mg  100 mg Oral Q24H GRETCHEN Devi MD   100 mg at 08/12/23 0815    And    hydroCHLOROthiazide (HYDRODIURIL) tablet 12.5 mg  12.5 mg Oral Q24H GRETCHEN Devi MD   12.5 mg at 08/12/23 0815    HYDROmorphone (DILAUDID) injection 0.5 mg  0.5 mg Intravenous Q2H PRN GRETCHEN Devi MD        And    naloxone (NARCAN) injection 0.4 mg  0.4 mg Intravenous Q5 Min PRN GRETCHEN Devi MD        lactated ringers infusion  100 mL/hr Intravenous Continuous PRN GRETCHEN Devi MD        lactated ringers infusion  9 mL/hr Intravenous Continuous GRETCHEN Devi MD        metFORMIN (GLUCOPHAGE) tablet 1,000 mg  1,000 mg Oral BID With Meals GRETCHEN Devi MD   1,000 mg at 08/12/23 0816     ondansetron (ZOFRAN) tablet 4 mg  4 mg Oral Q6H PRN GRETCHEN Devi MD        Or    ondansetron (ZOFRAN) injection 4 mg  4 mg Intravenous Q6H PRN GRETCHEN Devi MD        oxyCODONE-acetaminophen (PERCOCET) 7.5-325 MG per tablet 1 tablet  1 tablet Oral Q4H PRN GRETCHEN Devi MD   1 tablet at 08/12/23 0428    oxyCODONE-acetaminophen (PERCOCET) 7.5-325 MG per tablet 2 tablet  2 tablet Oral Q4H PRN GRETCHEN Devi MD        polyethylene glycol (MIRALAX) packet 17 g  17 g Oral Daily PRN GRETCHEN Devi MD        propafenone (RYTHMOL) tablet 300 mg  300 mg Oral Q8H GRETCHEN Devi MD   300 mg at 08/12/23 0504    sennosides-docusate (PERICOLACE) 8.6-50 MG per tablet 1 tablet  1 tablet Oral BID GRETCHEN Devi MD   1 tablet at 08/12/23 0815    sodium chloride 0.9 % flush 10 mL  10 mL Intravenous PRN GRETCHEN Devi MD        sodium chloride 0.9 % flush 3 mL  3 mL Intravenous Q12H GRETCHEN Devi MD   3 mL at 08/12/23 0816    sodium chloride 0.9 % infusion 40 mL  40 mL Intravenous PRN GRETCHEN Devi MD        sodium chloride 0.9 % infusion  100 mL/hr Intravenous Continuous GRETCHEN Devi  mL/hr at 08/11/23 1424 100 mL/hr at 08/11/23 1424       PHYSICAL EXAM:    Orientation:  alert and oriented to person, place, and time    Incision:  no significant drainage    Upper Extremity Motor :  5/5 bilaterally    Upper Motor Neuron Signs:  none     Lower Extremity Motor :  mild weakness to LLE    Lower Extremity Sensory:  Tibial nerve: Intact, Superficial peroneal nerve: Intact, and Deep peroneal nerve: Intact    Flatus:  flatus    ABNORMAL EXAM FINDINGS:  none    LABS:    Lab Results (last 24 hours)       Procedure Component Value Units Date/Time    Basic Metabolic Panel [[id number]]  (Abnormal) Collected: 08/12/23 0455    Specimen: Blood Updated: 08/12/23 0621     Glucose 123 mg/dL      BUN 11 mg/dL      Creatinine 0.71 mg/dL      Sodium 142 mmol/L      Potassium 4.1 mmol/L       Chloride 103 mmol/L      CO2 29.0 mmol/L      Calcium 9.3 mg/dL      BUN/Creatinine Ratio 15.5     Anion Gap 10.0 mmol/L      eGFR 99.9 mL/min/1.73     Narrative:      GFR Normal >60  Chronic Kidney Disease <60  Kidney Failure <15      CBC & Differential [[id number]]  (Abnormal) Collected: 08/12/23 0455    Specimen: Blood Updated: 08/12/23 0559    Narrative:      The following orders were created for panel order CBC & Differential.  Procedure                               Abnormality         Status                     ---------                               -----------         ------                     CBC Auto Differential[[id number]]        Abnormal            Final result                 Please view results for these tests on the individual orders.    CBC Auto Differential [[id number]]  (Abnormal) Collected: 08/12/23 0455    Specimen: Blood Updated: 08/12/23 0559     WBC 13.41 10*3/mm3      RBC 5.54 10*6/mm3      Hemoglobin 13.3 g/dL      Hematocrit 47.0 %      MCV 84.8 fL      MCH 24.0 pg      MCHC 28.3 g/dL      RDW 15.6 %      RDW-SD 48.1 fl      MPV 12.7 fL      Platelets 161 10*3/mm3      Neutrophil % 65.9 %      Lymphocyte % 23.0 %      Monocyte % 9.9 %      Eosinophil % 0.4 %      Basophil % 0.4 %      Immature Grans % 0.4 %      Neutrophils, Absolute 8.84 10*3/mm3      Lymphocytes, Absolute 3.08 10*3/mm3      Monocytes, Absolute 1.33 10*3/mm3      Eosinophils, Absolute 0.06 10*3/mm3      Basophils, Absolute 0.05 10*3/mm3      Immature Grans, Absolute 0.05 10*3/mm3      nRBC 0.0 /100 WBC     POC Glucose Once [[id number]]  (Abnormal) Collected: 08/11/23 0931    Specimen: Blood Updated: 08/11/23 0943     Glucose 166 mg/dL      Comment: : [id number] Bang RosarioMorris ID: [id number]               ASSESSMENT AND PLAN:    Post operative day 1 status post ALIF L5/S1    1:  Activity Level:  up with assist  2:  Pain Control:  continue current   3:  Discharge Planning:  pending completion of next stage  4:  Other:   encourage up for ambulation       Electronically signed by RUSLAN Loving 8/12/2023 09:21 CDT

## 2023-08-12 NOTE — THERAPY TREATMENT NOTE
Acute Care - Physical Therapy Treatment Note  [facility]     Patient Name: Bella Ordaz  : 1955  MRN: [id number]  Today's Date: 2023      Visit Dx:     ICD-10-CM ICD-9-CM   1. Impaired mobility [Z74.09 (ICD-10-CM)]  Z74.09 799.89     Patient Active Problem List   Diagnosis    Primary hypertension    Paroxysmal atrial fibrillation    Type 2 diabetes mellitus without complication, without long-term current use of insulin    Mixed hyperlipidemia    Lumbosacral disc disease    Chronic bilateral low back pain with bilateral sciatica    Lumbago of multiple sites in spine with sciatica    Lumbar radiculopathy     Past Medical History:   Diagnosis Date    Arthritis     Chronic left-sided low back pain     radiates down left leg    Diabetes mellitus     Facet arthropathy, lumbar     Hypertension     Pulmonary embolism 10/2022    pt states 2-3 blood clots in the lung    Sleep apnea     pt states doesn't really use his cpap machine     Past Surgical History:   Procedure Laterality Date    APPENDECTOMY      as a kid    CYST REMOVAL      tailbone    HAND SURGERY Left     nerve/tendon repair    KNEE ARTHROSCOPY Bilateral     x2 left, x1 right    LAPAROSCOPIC CHOLECYSTECTOMY      REPLACEMENT TOTAL KNEE Left 2023    SPINAL CORD DECOMPRESSION       PT Assessment (last 12 hours)       PT Evaluation and Treatment       Row Name 23 0822          Physical Therapy Time and Intention    Subjective Information complains of;pain  -AB     Document Type therapy note (daily note)  -AB     Mode of Treatment physical therapy  -AB       Row Name 23          General Information    Existing Precautions/Restrictions spinal;LSO;brace worn when out of bed;fall;other (see comments)  Prevena  -AB       Row Name 23          Pain    Pretreatment Pain Rating /10  -AB     Posttreatment Pain Rating /10  -AB     Pain Location - abdomen  -AB       Row Name 23          Bed Mobility    Rolling Right  [place] (Bed Mobility) verbal cues;standby assist  -AB     Sidelying-Sit [place] (Bed Mobility) standby assist  -AB     Assistive Device (Bed Mobility) bed rails  -AB       Row Name 08/12/23 0822          Sit-Stand Transfer    Sit-Stand [place] (Transfers) contact guard;verbal cues  -AB       Row Name 08/12/23 0822          Gait/Stairs (Locomotion)    Greenwood Level (Gait) contact guard  -AB     Assistive Device (Gait) walker, front-wheeled  -AB     Distance in Feet (Gait) 300'  -AB     Bilateral Gait Deviations forward flexed posture  -AB       Row Name             Wound 08/11/23 0736 Left lower abdomen Incision    Wound - Properties Group Placement Date: 08/11/23 -SA Placement Time: 0736 -SA Present on Hospital Admission: N  -SA Side: Left  -SA Orientation: lower  -SA Location: abdomen  -SA Primary Wound Type: Incision  -SA    Retired Wound - Properties Group Placement Date: 08/11/23 -SA Placement Time: 0736 -SA Present on Hospital Admission: N  -SA Side: Left  -SA Orientation: lower  -SA Location: abdomen  -SA Primary Wound Type: Incision  -SA    Retired Wound - Properties Group Date first assessed: 08/11/23 -SA Time first assessed: 0736 -SA Present on Hospital Admission: N  -SA Side: Left  -SA Location: abdomen  -SA Primary Wound Type: Incision  -SA      Row Name             NPWT (Negative Pressure Wound Therapy) 08/11/23 0903 left lower abdomen    NPWT (Negative Pressure Wound Therapy) - Properties Group Placement Date: 08/11/23 -SA Placement Time: 0903 -SA Location: left lower abdomen  -SA    Retired NPWT (Negative Pressure Wound Therapy) - Properties Group Placement Date: 08/11/23 -SA Placement Time: 0903 -SA Location: left lower abdomen  -SA    Retired NPWT (Negative Pressure Wound Therapy) - Properties Group Placement Date: 08/11/23 -SA Placement Time: 0903 -SA Location: left lower abdomen  -SA      Row Name 08/12/23 0822          Positioning and Restraints    Pre-Treatment  Position in bed  -AB     Post Treatment Position bed  -AB     In Bed fowlers;call light within reach  -AB               User Key  (r) = Recorded By, (t) = Taken By, (c) = Cosigned By      Initials Name Provider Type    Bella Chandler PTA Physical Therapist Assistant    Ruby Jackson, RN Registered Nurse                    Physical Therapy Education       Title: PT OT SLP Therapies (In Progress)       Topic: Physical Therapy (In Progress)       Point: Mobility training (Done)       Learning Progress Summary             Patient Acceptance, E, VU by MS at 8/11/2023 1514    Comment: role of PT in his care, spinal restrictions, use of LSO                         Point: Home exercise program (Not Started)       Learner Progress:  Not documented in this visit.              Point: Body mechanics (Not Started)       Learner Progress:  Not documented in this visit.              Point: Precautions (Done)       Learning Progress Summary             Patient Acceptance, E, VU by MS at 8/11/2023 1514    Comment: role of PT in his care, spinal restrictions, use of LSO                                         User Key       Initials Effective Dates Name Provider Type Discipline    MS 07/11/23 -  Miranda Edmond, PT, DPT, NCS Physical Therapist PT                  PT Recommendation and Plan             Time Calculation:    PT Charges       Row Name 08/12/23 0822             Time Calculation    Start Time 0822  -AB      Stop Time 0851  -AB      Time Calculation (min) 29 min  -AB      PT Received On 08/12/23  -AB         Time Calculation- PT    Total Timed Code Minutes- PT 29 minute(s)  -AB                User Key  (r) = Recorded By, (t) = Taken By, (c) = Cosigned By      Initials Name Provider Type    Bella Chandler PTA Physical Therapist Assistant                      PT G-Codes  Outcome Measure Options: AM-PAC 6 Clicks Basic Mobility (PT)  AM-PAC 6 Clicks Score (PT): 18  AM-PAC 6 Clicks Score (OT): 17    Bella LEE.  Melissa, PTA  8/12/2023

## 2023-08-12 NOTE — PLAN OF CARE
Patient has reported slight pain at times but has denied wanting pain medication   A&O    Problem: Fall Injury Risk  Goal: Absence of Fall and Fall-Related Injury  Intervention: Promote Injury-Free Environment  Recent Flowsheet Documentation  Taken 8/12/2023 0400 by Marlon Man RN  Safety Promotion/Fall Prevention:   safety round/check completed   fall prevention program maintained   assistive device/personal items within reach   clutter free environment maintained   room organization consistent  Taken 8/12/2023 0300 by Marlon Man RN  Safety Promotion/Fall Prevention:   safety round/check completed   fall prevention program maintained   clutter free environment maintained   assistive device/personal items within reach   room organization consistent  Taken 8/12/2023 0200 by Marlon Man RN  Safety Promotion/Fall Prevention:   safety round/check completed   fall prevention program maintained   clutter free environment maintained   assistive device/personal items within reach   room organization consistent  Taken 8/12/2023 0100 by Marlon Man RN  Safety Promotion/Fall Prevention:   safety round/check completed   fall prevention program maintained   clutter free environment maintained   assistive device/personal items within reach   room organization consistent  Taken 8/12/2023 0000 by Marlon Man RN  Safety Promotion/Fall Prevention:   safety round/check completed   fall prevention program maintained   assistive device/personal items within reach   clutter free environment maintained   room organization consistent  Taken 8/11/2023 2300 by Marlon Man, RN  Safety Promotion/Fall Prevention:   safety round/check completed   fall prevention program maintained   clutter free environment maintained   assistive device/personal items within reach   room organization consistent  Taken 8/11/2023 2200 by Marlon Man, RN  Safety Promotion/Fall Prevention:   safety round/check completed   fall prevention  program maintained   clutter free environment maintained   assistive device/personal items within reach   room organization consistent  Taken 8/11/2023 2100 by Marlon Man, RN  Safety Promotion/Fall Prevention:   safety round/check completed   fall prevention program maintained   assistive device/personal items within reach   clutter free environment maintained   room organization consistent  Taken 8/11/2023 2000 by Marlon Man, RN  Safety Promotion/Fall Prevention:   safety round/check completed   fall prevention program maintained   assistive device/personal items within reach   clutter free environment maintained   room organization consistent  Taken 8/11/2023 1900 by Marlon Man, RN  Safety Promotion/Fall Prevention:   safety round/check completed   fall prevention program maintained   assistive device/personal items within reach   clutter free environment maintained   room organization consistent

## 2023-08-12 NOTE — PAYOR COMM NOTE
"ADMIT INPT 8-11-23  UR  [phone]    Bella Ordaz \"BELEM\" (68 y.o. Male)       Date of Birth   1955    Social Security Number       Address   [address] [place] [address]    Home Phone   [phone]    MRN   [id number]       Holiness   Yarsani    Marital Status                               Admission Date   8/11/23    Admission Type   Elective    Admitting Provider   GRETCHEN Devi MD    Attending Provider   GRETCHEN Devi MD    Department, Room/Bed   [facility] 3A, 348/1       Discharge Date       Discharge Disposition       Discharge Destination                                 Attending Provider: GRETCHEN Devi MD    Allergies: No Known Allergies    Isolation: None   Infection: None   Code Status: CPR    Ht: 185 cm (72.84\")   Wt: 119 kg (262 lb 5.6 oz)    Admission Cmt: None   Principal Problem: Lumbago of multiple sites in spine with sciatica [M54.40]                   Active Insurance as of 8/11/2023       Primary Coverage       Payor Plan Insurance Group Employer/Plan Group    HUMANA MEDICARE REPLACEMENT HUMANA MEDICARE REPLACEMENT [id number]       Payor Plan Address Payor Plan Phone Number Payor Plan Fax Number Effective Dates    [address] [address] [phone]  1/1/2022 - None Entered    [facility] [id number]         Subscriber Name Subscriber Birth Date Member ID       BELLA ORDAZ 1955 [id number]                     Emergency Contacts        (Rel.) Home Phone Work Phone Mobile Phone    Sarah Ordaz (Spouse) [phone] -- [phone]                 Operative/Procedure Notes (all)        GRETCHEN Devi MD at 08/11/23 0620  Version 1 of 1         LUMBAR ANTERIOR INTERBODY FUSION  Procedure Note    Bella ALCIDES Orlin  8/11/2023    Pre-op Diagnosis:    1. Status post left L5-S1 hemilaminotomy, foraminotomy, decompression, 05/15/2013.   2. Mild chronic low back pain.  3. Recurrent left buttock, thigh and leg radiculopathy.  4. " Neurogenic claudication.  5. Multilevel lumbar degenerative disc disease.   6. Multilevel lumbar facet arthropathy.  7. Small central disc herniation L2-3.   8. Mild spondylolisthesis, L4-5.   9. Central stenosis L2-3.   10. Severe foraminal stenosis, left L5-S1.    Post-op Diagnosis:    same    Procedure/CPTr Codes:     1. Anterior discectomy decompression with bilateral neural foraminotomy L5-S1  2. Anterior lumbar interbody fusion L5-S1  3. Anterior spinal instrumentation L5-S1 (ATEC anterior plate and screws)  4. Use of titanium interbody biomechanical device for fusion L5-S1 (ATEC titanium spacer and screws)  5. Use of allograft bone matrix for fusion L5-S1 (OsteoAmp)  6. Use of fluoroscopy for confirmation of surgical level, placement of interbody spacer and instrumentation  7. Intraoperative neural monitoring     Anesthesia: General     Surgeon: BONIFACIO Devi MD     Co Surgeon: Dr. Sahil Armas D.O.     Assistant: Pro Donnelly PA-C     Estimated Blood Loss: 50 mL     Complications: None     Condition: Stable to PACU.     Indications:     The patient is a 68-year-old who sees Dr. Geovanny Olivarez for medical issues.  He originally presented to the office in 2013 with left leg radiculopathy.  We elected to proceed with a left L5-S1 hemilaminotomy and foraminotomies decompression which was performed on 5/15/2013.  The patient did reasonably well for a period of time but presented back to the office with mild chronic low back pain and a recurrence of his left leg radiculopathy as well as symptoms consistent with neurogenic claudication.  Repeat imaging studies revealed multilevel lumbar degenerative disc disease and facet arthropathy with a small central disc herniation causing central stenosis at L2-3, a mild spondylolisthesis at L4-5, but more importantly, severe foraminal stenosis once again on the left side of L5-S1.  It was felt that the stenosis at L5-S1 was contributing to his recurrent symptoms,  however it is possible he may require additional surgery at the L2-3 level at a later date.    After failing all conservative measures, it was mutually decided that surgery would be the best option.  Risks, benefits, and complications of surgery were discussed in detail. The patient appeared well informed and wished to proceed. We specifically discussed the risk of infection, blood loss, nerve root injury, CSF leak, and the possibility of incomplete resolution of symptoms. We also discussed the possible risk of a nonunion and the potential need for additional surgery in the event of a pseudoarthrosis or hardware failure.    We elected to proceed with a staged operation.  Today we are performing an anterior decompression and fusion of L5-S1, it is planned that we will be returning to surgery for a second posterior procedure at a later date.     Operative Procedure:     After obtaining informed consent and verifying the correct operative level, the patient was brought to the operating room and placed supine on an operating table. A general anesthetic was provided by the anesthesia service with the assistance of an endotracheal tube. Once this was appropriately positioned and secured, the anterior abdominal region was prepped and draped in usual sterile fashion. A surgical timeout was taken to confirm this was the correct patient, we were working at the correct level, and that preoperative antibiotics were given in a timely fashion.     At this point, Dr. Sahil Armas D.O. provided vascular access to the L5-S1 level. He performed a left sided anterior retroperitoneal approach to the L5-S1 segment. Please see his separate dictated operative report regarding the details on the approach itself.  When I entered the procedure, self retaining retractors were already in position with excellent exposure of the L5-S1 disc space.     After confirming we were at the correct level using fluoroscopy, I used a long handle 10  blade scalpel to cut into the L5-S1 disc space. A Fuller elevator was used to remove disc material off of the endplates. Disc material was retrieved using pituitaries and Kerrisons. A disc space distractor was then placed into the L5-S1 disc space and I used curettes to remove posterior disc material. Kerrisons were used to remove posterior osteophytes across the endplates of L5 and S1. There was high-grade stenosis centrally but also foraminally. I then performed a bilateral neural foraminotomy with Kerrisons and curettes. The decompression was much more involved than what is usually required for an anterior lumbar interbody fusion by itself and required significantly more time to perform.  This was due to the severe disc space collapse and high-grade foraminal stenosis especially on the left as expected.     After the decompression was completed bilaterally and centrally, I used a series of endplate scrapers to prepare the endplates for interbody fusion. Trial spacers were then malleted into position and it was felt that and 18 mm titanium spacer with 15 degrees lordosis from the ATEC instrumentation set would be the best fit to restore disc height also restoring foraminal height and providing some indirect decompression. The disc space was then thoroughly irrigated with saline solution.  Gelfoam powder with thrombin was used to control epidural bleeding.     An 18 mm titanium spacer from the ATEC instrumentation set was then packed as tightly as possible with an allograft bone matrix called OsteoAmp. This spacer was then malleted into the L5-S1 disc space under fluoroscopic guidance. It was placed as an interbody biomechanical device to assist with fusion.  I then placed an 8.5 mm x 30 mm graft bolt through the spacer into L5 and a 5.0 mm x 30 mm screw through the spacer into S1.  The screws were placed to help maintain position of the spacer as well as to assist with the fusion process.     A 4-hole anterior plate  from the [facility] instrumentation set was then chosen. The 4 holes were drilled and four 6.0 mm x 30 mm screws were used to fix the plate across the L5-S1 segment augmenting the fusion.      We then inspected the entire operative field for any signs of bleeding.  Bleeding was once again controlled using thrombin with Gelfoam powder and bipolar cautery.  Once we ensured that adequate hemostasis was accomplished, final fluoroscopy imaging was taken to confirm adequate position of our implants. There was excellent restoration of disc height and the plate and screw construct appeared to be adequately positioned across L5-S1.    I placed a VersaWrap over the anterior instrumentation to hopefully decrease the risk of scarring and postoperative adhesions.     Please see Dr. Sahil Armas's separate dictated operative report regarding the closure of the wound. Once this was accomplished, the patient was extubated and sent to the recovery room in good stable condition. We estimated blood loss to be approximately 50 mL and the patient remained hemodynamically stable during the procedure.     Intraoperative neuro monitoring was ordered and carried out throughout the procedure to add an increased level of safety for the patient.  The interpreting physician was available by means of real-time continuous, bidirectional, remote audio and visual communication as needed throughout the entire procedure.  Modalities used during the procedure included SSEP, EEG, MEP, EMG, and TOF.  There were no neuro monitoring signal changes during the procedure.    Dr. Sahil Armas D.O. provided vascular access to the L5-S1 level and acted as a co-surgeon in this fashion.  Pro Donnelly PA-C provided critical assistance during the decompression at L5-S1 as well as during the placement of the titanium spacer, bone graft and instrumentation to obtain a fusion across L5-S1.    BONIFACIO Devi MD    Date: 8/11/2023  Time: 11:13 CDT    Electronically  signed by GRETCHEN Devi MD at 08/11/23 1113       Sahil Armas DO at 08/11/23 0736  Version 1 of 1         Bella Ordaz  8/11/2023     PREOPERATIVE DIAGNOSIS:   1. Status post left L5-S1 hemilaminotomy, foraminotomy, decompression, 05/15/2013.   2. Mild chronic low back pain.  3. Recurrent left buttock, thigh and leg radiculopathy.  4. Neurogenic claudication.  5. Multilevel lumbar degenerative disc disease.   6. Multilevel lumbar facet arthropathy.  7. Small central disc herniation L2-3.   8. Mild spondylolisthesis, L4-5.   9. Central stenosis L2-3.   10. Severe foraminal stenosis, left L5-S1.       POSTOPERATIVE DIAGNOSIS: same     PROCEDURE PERFORMED:   1.  Anterior lumbar interbody fusion of L5-S1 with instrumentation  2.  Placement of a 13 cm Prevena wound VAC     SURGEON: Sahil Armas DO   COSURGEON: Jose Cruz Devi MD     ANESTHESIA: General.    PREPARATION: Routine.    STAFF: Circulator: Ruby Robert RN; Tainsha Wood RN  Scrub Person: Litzy Larry Marla J  Vendor Representative: Anu Alex Shannon  Assistant: Rajeev Donnelly PA    ESTIMATED BLOOD LOSS: 50 mL    SPECIMENS: None    COMPLICATIONS: None    INDICATIONS: Bella Ordaz is a 68 y.o. male who you are currently following for chronic back pain.  Bella Ordazis scheduled for anterior lumbar interbody fusion of L5-S1 with Dr. Devi on 8/11/2023.  The patient denies any history of DVT, but have a history PE. The indications, risks, and possible complications of the procedure were explained to the patient, who voiced understanding and wished to proceed with surgery.     PROCEDURE IN DETAIL:   The patient was taken to the operating room and placed on the operating table in a supine position. After general anesthesia was obtained, the abdomen was prepped and draped in a sterile manner.  A transverse incision was then made in the left lower quadrant.  Careful dissection was made down through  the subcutaneous tissues using the Bovie cautery to ensure hemostasis.  Any crossing veins were ligated with 3-0 silk suture and hemoclips.  The rectus fascia was identified.  It was incised with the Bovie cautery.  Kocher clamps are placed on each side of the rectus fascia and subfascial planes were established in a cephalad and caudad direction.  Once the subfascial planes were established the attention was then turned to the left rectus muscle.  Blunt mobilization was made of the left rectus muscle including its blood supply medially and to the right.  Once it was fully mobilized the attention was then turned laterally to the peritoneal reflection.  Entrance into the retroperitoneal space was established with the use of a sponge stick and the Bovie cautery.  Continued blunt mobilization was made with my hand using a finger sweeping motion moving the peritoneal contents and sacral fat pad medially and to the right.  Once it was fully mobilized the Brau-Gregory retractor system was set up.  The retractor blades were set in place.  The left iliac vein was then carefully dissected free and placed safely behind the retractor blade.  The sacral vessels were carefully taken down with hemoclips.  At this point full exposure was established of the L5-S1 disc space.  The next part of the case will be dictated by Dr. Devi.  Upon completion of Dr. Devi's part of the case the wound bed was irrigated with antibiotic saline and hemostasis was observed. VersaWrap was carefully placed over the L5-S1 disc base repair.  The retractor blades were carefully taken out one at a time.  The structures were then placed back in their normal anatomic positions.  The rectus fascia was then closed with a #1 PDS in a running fashion to meet in the midline.  The deep layers were closed with a 2-0 Vicryl in a running fashion.  The subcutaneous layers were closed with a 3-0 Vicryl in a running fashion.  The skin was then reapproximated  using a 4-0 Monocryl in a subcuticular fashion.  The wound was then cleaned.  A 13 cm Prevena wound VAC was applied.  The patient tolerated the procedure well. Sponge and needle counts were correct. The patient was then awakened and extubated in the operating room and taken to the recovery room in good condition.    Sahil Armas DO    Date: 8/11/2023 Time: 09:22 CDT    Electronically signed by Sahil Armas DO at 08/11/23 0970

## 2023-08-13 ENCOUNTER — ANESTHESIA EVENT (OUTPATIENT)
Dept: PERIOP | Facility: HOSPITAL | Age: 68
DRG: 455 | End: 2023-08-13
Payer: MEDICARE

## 2023-08-13 LAB
GLUCOSE BLDC GLUCOMTR-MCNC: 135 MG/DL (ref 70–130)
GLUCOSE BLDC GLUCOMTR-MCNC: 148 MG/DL (ref 70–130)
GLUCOSE BLDC GLUCOMTR-MCNC: 159 MG/DL (ref 70–130)
GLUCOSE BLDC GLUCOMTR-MCNC: 171 MG/DL (ref 70–130)

## 2023-08-13 PROCEDURE — 63710000001 INSULIN LISPRO (HUMAN) PER 5 UNITS: Performed by: FAMILY MEDICINE

## 2023-08-13 PROCEDURE — 97116 GAIT TRAINING THERAPY: CPT

## 2023-08-13 PROCEDURE — 82948 REAGENT STRIP/BLOOD GLUCOSE: CPT

## 2023-08-13 RX ORDER — POLYETHYLENE GLYCOL 3350 17 G/17G
17 POWDER, FOR SOLUTION ORAL 2 TIMES DAILY
Status: DISCONTINUED | OUTPATIENT
Start: 2023-08-13 | End: 2023-08-15 | Stop reason: HOSPADM

## 2023-08-13 RX ADMIN — LOSARTAN POTASSIUM 100 MG: 50 TABLET, FILM COATED ORAL at 09:07

## 2023-08-13 RX ADMIN — INSULIN LISPRO 2 UNITS: 100 INJECTION, SOLUTION INTRAVENOUS; SUBCUTANEOUS at 12:30

## 2023-08-13 RX ADMIN — OXYCODONE AND ACETAMINOPHEN 1 TABLET: 7.5; 325 TABLET ORAL at 14:12

## 2023-08-13 RX ADMIN — DOCUSATE SODIUM 50 MG AND SENNOSIDES 8.6 MG 1 TABLET: 8.6; 5 TABLET, FILM COATED ORAL at 09:07

## 2023-08-13 RX ADMIN — DOCUSATE SODIUM 50 MG AND SENNOSIDES 8.6 MG 1 TABLET: 8.6; 5 TABLET, FILM COATED ORAL at 20:35

## 2023-08-13 RX ADMIN — PROPAFENONE HYDROCHLORIDE 300 MG: 150 TABLET, FILM COATED ORAL at 14:12

## 2023-08-13 RX ADMIN — POLYETHYLENE GLYCOL 3350 17 G: 17 POWDER, FOR SOLUTION ORAL at 09:08

## 2023-08-13 RX ADMIN — PROPAFENONE HYDROCHLORIDE 300 MG: 150 TABLET, FILM COATED ORAL at 22:08

## 2023-08-13 RX ADMIN — Medication 3 ML: at 09:10

## 2023-08-13 RX ADMIN — METFORMIN HCL 1000 MG: 500 TABLET ORAL at 09:08

## 2023-08-13 RX ADMIN — EMPAGLIFLOZIN 25 MG: 25 TABLET, FILM COATED ORAL at 09:08

## 2023-08-13 RX ADMIN — HYDROCHLOROTHIAZIDE 12.5 MG: 25 TABLET ORAL at 09:07

## 2023-08-13 RX ADMIN — CYCLOBENZAPRINE 10 MG: 10 TABLET, FILM COATED ORAL at 16:16

## 2023-08-13 RX ADMIN — OXYCODONE AND ACETAMINOPHEN 1 TABLET: 7.5; 325 TABLET ORAL at 03:56

## 2023-08-13 RX ADMIN — METFORMIN HCL 1000 MG: 500 TABLET ORAL at 17:19

## 2023-08-13 RX ADMIN — POLYETHYLENE GLYCOL 3350 17 G: 17 POWDER, FOR SOLUTION ORAL at 20:34

## 2023-08-13 RX ADMIN — OXYCODONE AND ACETAMINOPHEN 2 TABLET: 7.5; 325 TABLET ORAL at 22:08

## 2023-08-13 RX ADMIN — ATORVASTATIN CALCIUM 40 MG: 40 TABLET, FILM COATED ORAL at 09:07

## 2023-08-13 RX ADMIN — PROPAFENONE HYDROCHLORIDE 300 MG: 150 TABLET, FILM COATED ORAL at 05:50

## 2023-08-13 RX ADMIN — OXYCODONE AND ACETAMINOPHEN 1 TABLET: 7.5; 325 TABLET ORAL at 09:07

## 2023-08-13 RX ADMIN — INSULIN LISPRO 2 UNITS: 100 INJECTION, SOLUTION INTRAVENOUS; SUBCUTANEOUS at 20:34

## 2023-08-13 NOTE — PLAN OF CARE
Goal Outcome Evaluation:  Plan of Care Reviewed With: patient        Progress: no change  Outcome Evaluation: Pt A&Ox4. Up x1ast w/ LSO brace. Urinal. Insc w/ would vac in place. PPP. Denies n/t. C/o of pain w/ PRN pain meds given w/ good releif. Sugars monitored. Call light in reach. Safety maintained. Will cont to monitor.

## 2023-08-13 NOTE — THERAPY TREATMENT NOTE
Acute Care - Physical Therapy Treatment Note  [facility]     Patient Name: Bella Ordaz  : 1955  MRN: [id number]  Today's Date: 2023      Visit Dx:     ICD-10-CM ICD-9-CM   1. Impaired mobility [Z74.09 (ICD-10-CM)]  Z74.09 799.89     Patient Active Problem List   Diagnosis    Primary hypertension    Paroxysmal atrial fibrillation    Type 2 diabetes mellitus without complication, without long-term current use of insulin    Mixed hyperlipidemia    Lumbosacral disc disease    Chronic bilateral low back pain with bilateral sciatica    Lumbago of multiple sites in spine with sciatica    Lumbar radiculopathy     Past Medical History:   Diagnosis Date    Arthritis     Chronic left-sided low back pain     radiates down left leg    Diabetes mellitus     Facet arthropathy, lumbar     Hypertension     Pulmonary embolism 10/2022    pt states 2-3 blood clots in the lung    Sleep apnea     pt states doesn't really use his cpap machine     Past Surgical History:   Procedure Laterality Date    APPENDECTOMY      as a kid    CYST REMOVAL      tailbone    HAND SURGERY Left     nerve/tendon repair    KNEE ARTHROSCOPY Bilateral     x2 left, x1 right    LAPAROSCOPIC CHOLECYSTECTOMY      REPLACEMENT TOTAL KNEE Left 2023    SPINAL CORD DECOMPRESSION       PT Assessment (last 12 hours)       PT Evaluation and Treatment       Row Name 23 1352 23 0845       Physical Therapy Time and Intention    Subjective Information complains of  -AB complains of;pain  -NW    Document Type therapy note (daily note)  -AB therapy note (daily note)  -NW    Mode of Treatment physical therapy  -AB physical therapy  -NW    Comment -- plan for 2nd part sx in am  -NW      Row Name 23 1352 23 0845       General Information    Existing Precautions/Restrictions fall;LSO;brace worn when out of bed;spinal  prevena  -AB fall;non-weight bearing;spinal;brace worn when out of bed  prevena  -NW      Row Name 23 0845           Pain    Pretreatment Pain Rating 5/10  -NW     Pain Location incisional  -NW     Pain Location - abdomen  -NW       Row Name 08/13/23 1352 08/13/23 0845       Bed Mobility    Rolling Right Beckham (Bed Mobility) verbal cues;standby assist  -AB verbal cues;standby assist  -NW    Sidelying-Sit Beckham (Bed Mobility) verbal cues;standby assist  -AB verbal cues;contact guard  -NW    Sit-Sidelying Beckham (Bed Mobility) standby assist  -AB --    Assistive Device (Bed Mobility) -- bed rails;head of bed elevated  -NW      Row Name 08/13/23 1352 08/13/23 0845       Sit-Stand Transfer    Sit-Stand Beckham (Transfers) verbal cues;contact guard  -AB verbal cues;contact guard  -NW    Assistive Device (Sit-Stand Transfers) -- walker, front-wheeled  -NW      Row Name 08/13/23 1352 08/13/23 0845       Stand-Sit Transfer    Stand-Sit Beckham (Transfers) verbal cues;contact guard  -AB verbal cues;contact guard  -NW      Row Name 08/13/23 0845          Toilet Transfer    Beckham Level (Toilet Transfer) verbal cues;contact guard  -NW       Row Name 08/13/23 1352 08/13/23 0845       Gait/Stairs (Locomotion)    Beckham Level (Gait) verbal cues;contact guard  -AB verbal cues;contact guard  -NW    Assistive Device (Gait) walker, front-wheeled  -AB walker, front-wheeled  -NW    Distance in Feet (Gait) 500'  -AB 200x2  -NW    Pattern (Gait) swing-through  -AB step-through  -NW    Deviations/Abnormal Patterns (Gait) cholo decreased  -AB cholo decreased  -NW    Bilateral Gait Deviations forward flexed posture  -AB --    Comment, (Gait/Stairs) -- reivewed POC. and back precautions. Pt able to brush teeth and use BR w/ only supervision  -NW      Row Name             Wound 08/11/23 0736 Left lower abdomen Incision    Wound - Properties Group Placement Date: 08/11/23 -SA Placement Time: 0736 -SA Present on Hospital Admission: N  -SA Side: Left  -SA Orientation: lower  -SA Location: abdomen  -SA Primary  Wound Type: Incision  -SA    Retired Wound - Properties Group Placement Date: 08/11/23 -SA Placement Time: 0736 -SA Present on Hospital Admission: N  -SA Side: Left  -SA Orientation: lower  -SA Location: abdomen  -SA Primary Wound Type: Incision  -SA    Retired Wound - Properties Group Date first assessed: 08/11/23 -SA Time first assessed: 0736 -SA Present on Hospital Admission: N  -SA Side: Left  -SA Location: abdomen  -SA Primary Wound Type: Incision  -SA      Row Name             NPWT (Negative Pressure Wound Therapy) 08/11/23 0903 left lower abdomen    NPWT (Negative Pressure Wound Therapy) - Properties Group Placement Date: 08/11/23 -SA Placement Time: 0903 -SA Location: left lower abdomen  -SA    Retired NPWT (Negative Pressure Wound Therapy) - Properties Group Placement Date: 08/11/23 -SA Placement Time: 0903 -SA Location: left lower abdomen  -SA    Retired NPWT (Negative Pressure Wound Therapy) - Properties Group Placement Date: 08/11/23 -SA Placement Time: 0903 -SA Location: left lower abdomen  -SA      Row Name 08/13/23 1352 08/13/23 0845       Positioning and Restraints    Pre-Treatment Position in bed  -AB in bed  -NW    Post Treatment Position bed  -AB chair  -NW    In Bed sitting EOB;call light within reach;with nsg  -AB --    In Chair -- sitting;call light within reach;encouraged to call for assist;with nsg  -NW              User Key  (r) = Recorded By, (t) = Taken By, (c) = Cosigned By      Initials Name Provider Type    Bella Chandler PTA Physical Therapist Assistant    NW Mona Wynne PTA Physical Therapist Assistant    Ruby Jackson, RN Registered Nurse                    Physical Therapy Education       Title: PT OT SLP Therapies (In Progress)       Topic: Physical Therapy (In Progress)       Point: Mobility training (Done)       Learning Progress Summary             Patient Acceptance, E VU by MS at 8/11/2023 6760    Comment: role of PT in his care, spinal restrictions,  use of LSO                         Point: Home exercise program (Not Started)       Learner Progress:  Not documented in this visit.              Point: Body mechanics (Not Started)       Learner Progress:  Not documented in this visit.              Point: Precautions (Done)       Learning Progress Summary             Patient Acceptance, E, VU by MS at 8/11/2023 1514    Comment: role of PT in his care, spinal restrictions, use of LSO                                         User Key       Initials Effective Dates Name Provider Type Discipline    MS 07/11/23 -  Miranda Edmond, PT, DPT, NCS Physical Therapist PT                  PT Recommendation and Plan             Time Calculation:    PT Charges       Row Name 08/13/23 1352 08/13/23 0915          Time Calculation    Start Time 1352  -AB 0845  -NW     Stop Time 1415  -AB 0910  -NW     Time Calculation (min) 23 min  -AB 25 min  -NW     PT Received On 08/13/23  -AB 08/13/23  -NW     PT Goal Re-Cert Due Date -- 08/21/23  -NW        Time Calculation- PT    Total Timed Code Minutes- PT 23 minute(s)  -AB 25 minute(s)  -NW        Timed Charges    96132 - Gait Training Minutes  -- 25  -NW        Total Minutes    Timed Charges Total Minutes -- 25  -NW      Total Minutes -- 25  -NW               User Key  (r) = Recorded By, (t) = Taken By, (c) = Cosigned By      Initials Name Provider Type    AB Bella Torres PTA Physical Therapist Assistant    NW Mona Wynne PTA Physical Therapist Assistant                  Therapy Charges for Today       Code Description Service Date Service Provider Modifiers Qty    88440975492 HC GAIT TRAINING EA 15 MIN 8/12/2023 Bella Torres PTA GP 2            PT G-Codes  Outcome Measure Options: AM-PAC 6 Clicks Basic Mobility (PT)  AM-PAC 6 Clicks Score (PT): 18  AM-PAC 6 Clicks Score (OT): 17    Bella Torres PTA  8/13/2023

## 2023-08-13 NOTE — PROGRESS NOTES
[facility]  Spine Surgery  RUSLAN Loving   Progress Note        Subjective/Overnight Events:  Stable this morning, pain is controlled, patient notes his left leg pain is significantly improved, he has no acute complaint this morning, ready for second stage tomorrow.     Vitals  Vitals:    08/12/23 1949 08/12/23 2107 08/13/23 0550 08/13/23 0830   BP: 143/59 156/64 129/65 129/87   BP Location: Right arm      Patient Position: Lying   Lying   Pulse: 63 98 89 87   Resp: 16   16   Temp: 99.5 øF (37.5 øC)   97.5 øF (36.4 øC)   TempSrc: Oral   Axillary   SpO2: 94%   95%   Weight:       Height:           Current Facility-Administered Medications   Medication Dose Route Frequency Provider Last Rate Last Admin    acetaminophen (TYLENOL) tablet 650 mg  650 mg Oral Q4H PRN GRETCHEN Devi MD        atorvastatin (LIPITOR) tablet 40 mg  40 mg Oral Daily GRETCHEN Devi MD   40 mg at 08/13/23 0907    [START ON 8/14/2023] ceFAZolin 2000 mg IVPB in 100 mL NS (MBP)  2,000 mg Intravenous On Call to OR GRETCHEN Devi MD        cyclobenzaprine (FLEXERIL) tablet 10 mg  10 mg Oral TID PRN GRETCHEN Devi MD        dextrose (D50W) (25 g/50 mL) IV injection 25 g  25 g Intravenous Q15 Min PRN Hakeem Louise MD        dextrose (GLUTOSE) oral gel 15 g  15 g Oral Q15 Min PRN Hakeem Louise MD        empagliflozin (JARDIANCE) tablet 25 mg  25 mg Oral Daily GRETCHEN Devi MD   25 mg at 08/13/23 0908    glucagon (human recombinant) (GLUCAGEN DIAGNOSTIC) injection 1 mg  1 mg Intramuscular Q15 Min PRN Hakeem Louise MD        losartan (COZAAR) tablet 100 mg  100 mg Oral Q24H GRETCHEN Devi MD   100 mg at 08/13/23 0907    And    hydroCHLOROthiazide (HYDRODIURIL) tablet 12.5 mg  12.5 mg Oral Q24H GRETCHEN Devi MD   12.5 mg at 08/13/23 0907    HYDROmorphone (DILAUDID) injection 0.5 mg  0.5 mg Intravenous Q2H PRN GRETCHEN Devi MD        And    naloxone  (NARCAN) injection 0.4 mg  0.4 mg Intravenous Q5 Min PRN GRETCHEN Devi MD        Insulin Lispro (humaLOG) injection 2-9 Units  2-9 Units Subcutaneous 4x Daily AC & at Bedtime Hakeem Louise MD   2 Units at 08/12/23 2118    lactated ringers infusion  100 mL/hr Intravenous Continuous PRN GRETCHEN Devi MD        lactated ringers infusion  9 mL/hr Intravenous Continuous GRETCHEN Devi MD        metFORMIN (GLUCOPHAGE) tablet 1,000 mg  1,000 mg Oral BID With Meals GRETCHEN Devi MD   1,000 mg at 08/13/23 0908    ondansetron (ZOFRAN) tablet 4 mg  4 mg Oral Q6H PRN GRETCHEN Devi MD        Or    ondansetron (ZOFRAN) injection 4 mg  4 mg Intravenous Q6H PRN GRETCHEN Devi MD        oxyCODONE-acetaminophen (PERCOCET) 7.5-325 MG per tablet 1 tablet  1 tablet Oral Q4H PRN GRETCHEN Devi MD   1 tablet at 08/13/23 0907    oxyCODONE-acetaminophen (PERCOCET) 7.5-325 MG per tablet 2 tablet  2 tablet Oral Q4H PRN GRETCHEN Devi MD        polyethylene glycol (MIRALAX) packet 17 g  17 g Oral BID Hakeem Louise MD   17 g at 08/13/23 0908    propafenone (RYTHMOL) tablet 300 mg  300 mg Oral Q8H GRETCHEN Devi MD   300 mg at 08/13/23 0550    sennosides-docusate (PERICOLACE) 8.6-50 MG per tablet 1 tablet  1 tablet Oral BID GRETCHEN Devi MD   1 tablet at 08/13/23 0907    sodium chloride 0.9 % flush 10 mL  10 mL Intravenous PRN GRETCHEN Devi MD        sodium chloride 0.9 % flush 3 mL  3 mL Intravenous Q12H GRETCHEN Devi MD   3 mL at 08/13/23 0910    sodium chloride 0.9 % infusion 40 mL  40 mL Intravenous PRN GRETCHEN Devi MD        sodium chloride 0.9 % infusion  100 mL/hr Intravenous Continuous GRETCHEN Devi  mL/hr at 08/11/23 1424 100 mL/hr at 08/11/23 1424       PHYSICAL EXAM:    Orientation:  alert and oriented to person, place, and time    Incision:  no significant drainage    Upper Extremity Motor :  5/5 bilaterally    Upper Motor Neuron  Signs:  none     Lower Extremity Motor :  mild weakness to LLE    Lower Extremity Sensory:  Tibial nerve: Intact, Superficial peroneal nerve: Intact, and Deep peroneal nerve: Intact    Flatus:  flatus    ABNORMAL EXAM FINDINGS:  none    LABS:    Lab Results (last 24 hours)       Procedure Component Value Units Date/Time    POC Glucose Once [[id number]]  (Abnormal) Collected: 08/13/23 0829    Specimen: Blood Updated: 08/13/23 0840     Glucose 135 mg/dL      Comment: : [id number] Radha Chel ID: [id number]       POC Glucose Once [[id number]]  (Abnormal) Collected: 08/12/23 2109    Specimen: Blood Updated: 08/12/23 2120     Glucose 160 mg/dL      Comment: : [id number] Jay Juana ID: [id number]       POC Glucose Once [[id number]]  (Abnormal) Collected: 08/12/23 1644    Specimen: Blood Updated: 08/12/23 1655     Glucose 254 mg/dL      Comment: : [id number] Elkemari Shayne ID: [id number]       POC Glucose Once [[id number]]  (Abnormal) Collected: 08/12/23 1037    Specimen: Blood Updated: 08/12/23 1115     Glucose 195 mg/dL      Comment: : [id number] Kirk Isaiah ID: [id number]       Hemoglobin A1c [[id number]]  (Abnormal) Collected: 08/12/23 0455    Specimen: Blood Updated: 08/12/23 1051     Hemoglobin A1C 6.80 %     Narrative:      Hemoglobin A1C Ranges:    Increased Risk for Diabetes  5.7% to 6.4%  Diabetes                     >= 6.5%  Diabetic Goal                < 7.0%            ASSESSMENT AND PLAN:    Post operative day 2 status post ALIF L5/S1    1:  Activity Level:  up with assist  2:  Pain Control:  continue current   3:  Discharge Planning:  pending completion of next stage  4:  Other:  encourage up for ambulation, npo after midnight, abx on call to OR       Electronically signed by RUSLAN Loving 8/13/2023 09:25 CDT

## 2023-08-13 NOTE — PLAN OF CARE
Goal Outcome Evaluation:  Plan of Care Reviewed With: patient        Progress: no change   Pt is a&ox4. Up ambulating hallway with therapy. LSO in use when oob. Voiding. Up in chair today. PRN Percocet given for c/o pain with relief noted. TIERNEY. PPP. Abd incision with prevena wound vac-functioning well.

## 2023-08-13 NOTE — PROGRESS NOTES
Daily Progress Note  Bella Ordaz  MRN: [id number] LOS: 2    Admit Date: 2023 08:25 CDT    Subjective:          Chief Complaint:  Low back pain    Interval History:    Reviewed overnight events and nursing notes.   Patient lying in bed this morning and continues to complain of low back pain.  Has not yet had a bowel movement    Review of Systems   Constitutional:  Negative for chills and fever.   Respiratory:  Negative for shortness of breath.    Cardiovascular:  Negative for chest pain.   Gastrointestinal:  Positive for constipation.   Musculoskeletal:  Positive for back pain.     DIET:  Diet: Regular/House Diet; Texture: Regular Texture (IDDSI 7); Fluid Consistency: Thin (IDDSI 0)    Medications:   lactated ringers, 100 mL/hr  lactated ringers, 9 mL/hr  sodium chloride, 100 mL/hr, Last Rate: 100 mL/hr (23 1424)      atorvastatin, 40 mg, Oral, Daily  empagliflozin, 25 mg, Oral, Daily  losartan, 100 mg, Oral, Q24H   And  hydroCHLOROthiazide, 12.5 mg, Oral, Q24H  insulin lispro, 2-9 Units, Subcutaneous, 4x Daily AC & at Bedtime  metFORMIN, 1,000 mg, Oral, BID With Meals  propafenone, 300 mg, Oral, Q8H  senna-docusate sodium, 1 tablet, Oral, BID  sodium chloride, 3 mL, Intravenous, Q12H        Data:     Code Status:   Code Status and Medical Interventions:   Ordered at: 23 1219     Code Status (Patient has no pulse and is not breathing):    CPR (Attempt to Resuscitate)     Medical Interventions (Patient has pulse or is breathing):    Full Support       Family History   Problem Relation Age of Onset    Heart failure Mother     Heart failure Father      Social History     Socioeconomic History    Marital status:    Tobacco Use    Smoking status: Former     Packs/day: 2.00     Years: 5.00     Pack years: 10.00     Types: Cigarettes     Quit date:      Years since quittin.6    Smokeless tobacco: Never   Vaping Use    Vaping Use: Never used   Substance and Sexual Activity     Alcohol use: Not Currently    Drug use: Not Currently    Sexual activity: Defer       Labs:    Lab Results (last 72 hours)       Procedure Component Value Units Date/Time    POC Glucose Once [[id number]]  (Abnormal) Collected: 08/12/23 2109    Specimen: Blood Updated: 08/12/23 2120     Glucose 160 mg/dL      Comment: : [id number] Jay De LeóneMeter ID: [id number]       POC Glucose Once [[id number]]  (Abnormal) Collected: 08/12/23 1644    Specimen: Blood Updated: 08/12/23 1655     Glucose 254 mg/dL      Comment: : [id number] Eduar MakeMeter ID: [id number]       POC Glucose Once [[id number]]  (Abnormal) Collected: 08/12/23 1037    Specimen: Blood Updated: 08/12/23 1115     Glucose 195 mg/dL      Comment: : [id number] Reagan MonetaMeter ID: [id number]       Hemoglobin A1c [[id number]]  (Abnormal) Collected: 08/12/23 0455    Specimen: Blood Updated: 08/12/23 1051     Hemoglobin A1C 6.80 %     Narrative:      Hemoglobin A1C Ranges:    Increased Risk for Diabetes  5.7% to 6.4%  Diabetes                     >= 6.5%  Diabetic Goal                < 7.0%    Basic Metabolic Panel [[id number]]  (Abnormal) Collected: 08/12/23 0455    Specimen: Blood Updated: 08/12/23 0621     Glucose 123 mg/dL      BUN 11 mg/dL      Creatinine 0.71 mg/dL      Sodium 142 mmol/L      Potassium 4.1 mmol/L      Chloride 103 mmol/L      CO2 29.0 mmol/L      Calcium 9.3 mg/dL      BUN/Creatinine Ratio 15.5     Anion Gap 10.0 mmol/L      eGFR 99.9 mL/min/1.73     Narrative:      GFR Normal >60  Chronic Kidney Disease <60  Kidney Failure <15      CBC & Differential [[id number]]  (Abnormal) Collected: 08/12/23 0455    Specimen: Blood Updated: 08/12/23 0559    Narrative:      The following orders were created for panel order CBC & Differential.  Procedure                               Abnormality         Status                     ---------                               -----------         ------                     CBC Auto  "Differential[[id number]]        Abnormal            Final result                 Please view results for these tests on the individual orders.    CBC Auto Differential [[id number]]  (Abnormal) Collected: 23 0455    Specimen: Blood Updated: 23 0559     WBC 13.41 10*3/mm3      RBC 5.54 10*6/mm3      Hemoglobin 13.3 g/dL      Hematocrit 47.0 %      MCV 84.8 fL      MCH 24.0 pg      MCHC 28.3 g/dL      RDW 15.6 %      RDW-SD 48.1 fl      MPV 12.7 fL      Platelets 161 10*3/mm3      Neutrophil % 65.9 %      Lymphocyte % 23.0 %      Monocyte % 9.9 %      Eosinophil % 0.4 %      Basophil % 0.4 %      Immature Grans % 0.4 %      Neutrophils, Absolute 8.84 10*3/mm3      Lymphocytes, Absolute 3.08 10*3/mm3      Monocytes, Absolute 1.33 10*3/mm3      Eosinophils, Absolute 0.06 10*3/mm3      Basophils, Absolute 0.05 10*3/mm3      Immature Grans, Absolute 0.05 10*3/mm3      nRBC 0.0 /100 WBC     POC Glucose Once [[id number]]  (Abnormal) Collected: 23 0931    Specimen: Blood Updated: 23 0943     Glucose 166 mg/dL      Comment: : [id number] Bang MacdonalduaMeter ID: [id number]       POC Glucose Once [[id number]]  (Abnormal) Collected: 23 0623    Specimen: Blood Updated: 23 0636     Glucose 196 mg/dL      Comment: : [id number] Rico AprilMeter ID: [id number]                 Objective:     Vitals: /65   Pulse 89   Temp 99.5 øF (37.5 øC) (Oral)   Resp 16   Ht 185 cm (72.84\")   Wt 119 kg (262 lb 5.6 oz)   SpO2 94%   BMI 34.77 kg/mý    Intake/Output Summary (Last 24 hours) at 2023 0825  Last data filed at 2023 0410  Gross per 24 hour   Intake 360 ml   Output 1650 ml   Net -1290 ml    Temp (24hrs), Av.7 øF (37.1 øC), Min:98.1 øF (36.7 øC), Max:99.5 øF (37.5 øC)      Physical Exam  Constitutional:       Appearance: He is well-developed.   HENT:      Head: Normocephalic and atraumatic.   Eyes:      Conjunctiva/sclera: Conjunctivae normal.      Pupils: Pupils are equal, round, " and reactive to light.   Cardiovascular:      Rate and Rhythm: Normal rate and regular rhythm.      Heart sounds: Normal heart sounds. No murmur heard.    No friction rub.   Pulmonary:      Effort: Pulmonary effort is normal. No respiratory distress.      Breath sounds: Normal breath sounds. No wheezing or rales.   Abdominal:      General: Bowel sounds are normal. There is no distension.      Palpations: Abdomen is soft.      Tenderness: There is no abdominal tenderness.   Musculoskeletal:         General: Normal range of motion.      Cervical back: Normal range of motion.   Skin:     Capillary Refill: Capillary refill takes less than 2 seconds.   Neurological:      Mental Status: He is alert and oriented to person, place, and time.      Cranial Nerves: No cranial nerve deficit.   Psychiatric:         Behavior: Behavior normal.           Assessment and Plan:     Primary Problem:  Lumbago of multiple sites in spine with sciatica    Hospital Problem list:    Lumbago of multiple sites in spine with sciatica    Primary hypertension    Paroxysmal atrial fibrillation    Type 2 diabetes mellitus without complication, without long-term current use of insulin    Mixed hyperlipidemia    Lumbosacral disc disease    Chronic bilateral low back pain with bilateral sciatica    Lumbar radiculopathy      PMH:  Past Medical History:   Diagnosis Date    Arthritis     Chronic left-sided low back pain     radiates down left leg    Diabetes mellitus     Facet arthropathy, lumbar     Hypertension     Pulmonary embolism 10/2022    pt states 2-3 blood clots in the lung    Sleep apnea     pt states doesn't really use his cpap machine       Treatment Plan:  Lumbago with sciatica  Status post ALIF L5/S1 on 8/11/2023.  Management per orthopedic surgery.     Paroxysmal atrial fibrillation.    Would benefit from resuming his home Eliquis.  Will need to be cleared from surgical standpoint for this.  Planning neck stage so will await completion of  all surgical intervention     Type 2 diabetes mellitus  Reports poor control with prior A1c of 8 but thinks it may be better now.  A1c actually 6.8% on check care.  Continue home medications with Jardiance and metformin.  Continue sliding scale insulin     Primary hypertension  Resume home medication monitor blood pressure.     Disposition: Medically stable    Reviewed treatment plans with the patient and/or family.   20 minutes spent in face to face interaction and coordination of care.     Electronically signed by Hakeem Louise MD on 8/13/2023 at 08:25 CDT

## 2023-08-13 NOTE — THERAPY TREATMENT NOTE
Acute Care - Physical Therapy Treatment Note  [facility]     Patient Name: Bella Ordaz  : 1955  MRN: [id number]  Today's Date: 2023      Visit Dx:     ICD-10-CM ICD-9-CM   1. Impaired mobility [Z74.09 (ICD-10-CM)]  Z74.09 799.89     Patient Active Problem List   Diagnosis    Primary hypertension    Paroxysmal atrial fibrillation    Type 2 diabetes mellitus without complication, without long-term current use of insulin    Mixed hyperlipidemia    Lumbosacral disc disease    Chronic bilateral low back pain with bilateral sciatica    Lumbago of multiple sites in spine with sciatica    Lumbar radiculopathy     Past Medical History:   Diagnosis Date    Arthritis     Chronic left-sided low back pain     radiates down left leg    Diabetes mellitus     Facet arthropathy, lumbar     Hypertension     Pulmonary embolism 10/2022    pt states 2-3 blood clots in the lung    Sleep apnea     pt states doesn't really use his cpap machine     Past Surgical History:   Procedure Laterality Date    APPENDECTOMY      as a kid    CYST REMOVAL      tailbone    HAND SURGERY Left     nerve/tendon repair    KNEE ARTHROSCOPY Bilateral     x2 left, x1 right    LAPAROSCOPIC CHOLECYSTECTOMY      REPLACEMENT TOTAL KNEE Left 2023    SPINAL CORD DECOMPRESSION       PT Assessment (last 12 hours)       PT Evaluation and Treatment       Row Name 23 0845          Physical Therapy Time and Intention    Subjective Information complains of;pain  -NW     Document Type therapy note (daily note)  -NW     Mode of Treatment physical therapy  -NW     Comment plan for 2nd part sx in am  -NW       Row Name 23 0845          General Information    Existing Precautions/Restrictions fall;non-weight bearing;spinal;brace worn when out of bed  prevena  -NW       Row Name 23 0845          Pain    Pretreatment Pain Rating 5/10  -NW     Pain Location incisional  -NW     Pain Location - abdomen  -NW       Row Name 23 0861           Bed Mobility    Rolling Right [place] (Bed Mobility) verbal cues;standby assist  -NW     Sidelying-Sit [place] (Bed Mobility) verbal cues;contact guard  -NW     Assistive Device (Bed Mobility) bed rails;head of bed elevated  -NW       Row Name 08/13/23 0845          Sit-Stand Transfer    Sit-Stand [place] (Transfers) verbal cues;contact guard  -NW     Assistive Device (Sit-Stand Transfers) walker, front-wheeled  -NW       Row Name 08/13/23 0845          Stand-Sit Transfer    Stand-Sit [place] (Transfers) verbal cues;contact guard  -NW       Row Name 08/13/23 0845          Toilet Transfer    [place] Level (Toilet Transfer) verbal cues;contact guard  -NW       Row Name 08/13/23 0845          Gait/Stairs (Locomotion)    [place] Level (Gait) verbal cues;contact guard  -NW     Assistive Device (Gait) walker, front-wheeled  -NW     Distance in Feet (Gait) 200x2  -NW     Pattern (Gait) step-through  -NW     Deviations/Abnormal Patterns (Gait) cholo decreased  -NW     Comment, (Gait/Stairs) reivewed POC. and back precautions. Pt able to brush teeth and use BR w/ only supervision  -NW       Row Name             Wound 08/11/23 0736 Left lower abdomen Incision    Wound - Properties Group Placement Date: 08/11/23 -SA Placement Time: 0736 -SA Present on Hospital Admission: N  -SA Side: Left  -SA Orientation: lower  -SA Location: abdomen  -SA Primary Wound Type: Incision  -SA    Retired Wound - Properties Group Placement Date: 08/11/23 -SA Placement Time: 0736 -SA Present on Hospital Admission: N  -SA Side: Left  -SA Orientation: lower  -SA Location: abdomen  -SA Primary Wound Type: Incision  -SA    Retired Wound - Properties Group Date first assessed: 08/11/23 -SA Time first assessed: 0736 -SA Present on Hospital Admission: N  -SA Side: Left  -SA Location: abdomen  -SA Primary Wound Type: Incision  -SA      Row Name             NPWT (Negative Pressure Wound Therapy) 08/11/23 0903 left lower  abdomen    NPWT (Negative Pressure Wound Therapy) - Properties Group Placement Date: 08/11/23 -SA Placement Time: 0903 -SA Location: left lower abdomen  -SA    Retired NPWT (Negative Pressure Wound Therapy) - Properties Group Placement Date: 08/11/23 -SA Placement Time: 0903 -SA Location: left lower abdomen  -SA    Retired NPWT (Negative Pressure Wound Therapy) - Properties Group Placement Date: 08/11/23 -SA Placement Time: 0903 -SA Location: left lower abdomen  -SA      Row Name 08/13/23 0845          Positioning and Restraints    Pre-Treatment Position in bed  -NW     Post Treatment Position chair  -NW     In Chair sitting;call light within reach;encouraged to call for assist;with nsg  -NW               User Key  (r) = Recorded By, (t) = Taken By, (c) = Cosigned By      Initials Name Provider Type    Mona Ewing, PTA Physical Therapist Assistant    Ruby Jackson, RN Registered Nurse                    Physical Therapy Education       Title: PT OT SLP Therapies (In Progress)       Topic: Physical Therapy (In Progress)       Point: Mobility training (Done)       Learning Progress Summary             Patient Acceptance, E, VU by MS at 8/11/2023 1514    Comment: role of PT in his care, spinal restrictions, use of LSO                         Point: Home exercise program (Not Started)       Learner Progress:  Not documented in this visit.              Point: Body mechanics (Not Started)       Learner Progress:  Not documented in this visit.              Point: Precautions (Done)       Learning Progress Summary             Patient Acceptance, E, VU by MS at 8/11/2023 1514    Comment: role of PT in his care, spinal restrictions, use of LSO                                         User Key       Initials Effective Dates Name Provider Type Discipline    MS 07/11/23 -  Miranda Edmond, PT, DPT, NCS Physical Therapist PT                  PT Recommendation and Plan     Plan of Care Reviewed With:  patient  Progress: improving  Outcome Evaluation: Bed mobility sba/cga using bedrails. sit-stand cga w/ bed elevated and extra time. Pt able to don brace w//cues and cga. Pt amb 200'x2 rwx sba/cga. reviewed home safety and POC. plan for 2nd part sx in am. will cont to follow       Time Calculation:    PT Charges       Row Name 08/13/23 0915             Time Calculation    Start Time 0845  -NW      Stop Time 0910  -NW      Time Calculation (min) 25 min  -NW      PT Received On 08/13/23  -NW      PT Goal Re-Cert Due Date 08/21/23  -NW         Time Calculation- PT    Total Timed Code Minutes- PT 25 minute(s)  -NW         Timed Charges    23612 - Gait Training Minutes  25  -NW         Total Minutes    Timed Charges Total Minutes 25  -NW       Total Minutes 25  -NW                User Key  (r) = Recorded By, (t) = Taken By, (c) = Cosigned By      Initials Name Provider Type    NW Mona Wynne PTA Physical Therapist Assistant                  Therapy Charges for Today       Code Description Service Date Service Provider Modifiers Qty    [id number] HC GAIT TRAINING EA 15 MIN 8/13/2023 Mona Wynne PTA GP 2            PT G-Codes  Outcome Measure Options: AM-PAC 6 Clicks Basic Mobility (PT)  AM-PAC 6 Clicks Score (PT): 18  AM-PAC 6 Clicks Score (OT): 17    Mona Wynne PTA  8/13/2023

## 2023-08-13 NOTE — PLAN OF CARE
Goal Outcome Evaluation:  Plan of Care Reviewed With: patient        Progress: improving  Outcome Evaluation: Bed mobility sba/cga using bedrails. sit-stand cga w/ bed elevated and extra time. Pt able to don brace w//cues and cga. Pt amb 200'x2 rwx sba/cga. reviewed home safety and POC. plan for 2nd part sx in am. will cont to follow

## 2023-08-14 ENCOUNTER — APPOINTMENT (OUTPATIENT)
Dept: GENERAL RADIOLOGY | Facility: HOSPITAL | Age: 68
DRG: 455 | End: 2023-08-14
Payer: MEDICARE

## 2023-08-14 ENCOUNTER — ANESTHESIA (OUTPATIENT)
Dept: PERIOP | Facility: HOSPITAL | Age: 68
DRG: 455 | End: 2023-08-14
Payer: MEDICARE

## 2023-08-14 LAB
GLUCOSE BLDC GLUCOMTR-MCNC: 110 MG/DL (ref 70–130)
GLUCOSE BLDC GLUCOMTR-MCNC: 128 MG/DL (ref 70–130)
GLUCOSE BLDC GLUCOMTR-MCNC: 140 MG/DL (ref 70–130)
GLUCOSE BLDC GLUCOMTR-MCNC: 188 MG/DL (ref 70–130)

## 2023-08-14 PROCEDURE — 97164 PT RE-EVAL EST PLAN CARE: CPT

## 2023-08-14 PROCEDURE — 0SG3071 FUSION OF LUMBOSACRAL JOINT WITH AUTOLOGOUS TISSUE SUBSTITUTE, POSTERIOR APPROACH, POSTERIOR COLUMN, OPEN APPROACH: ICD-10-PCS | Performed by: ORTHOPAEDIC SURGERY

## 2023-08-14 PROCEDURE — 25010000002 CEFAZOLIN PER 500 MG: Performed by: PHYSICIAN ASSISTANT

## 2023-08-14 PROCEDURE — 82948 REAGENT STRIP/BLOOD GLUCOSE: CPT

## 2023-08-14 PROCEDURE — 0 BUPIVACAINE LIPOSOME 1.3 % SUSPENSION 10 ML VIAL: Performed by: ORTHOPAEDIC SURGERY

## 2023-08-14 PROCEDURE — 76000 FLUOROSCOPY <1 HR PHYS/QHP: CPT

## 2023-08-14 PROCEDURE — C9290 INJ, BUPIVACAINE LIPOSOME: HCPCS | Performed by: ORTHOPAEDIC SURGERY

## 2023-08-14 PROCEDURE — C1769 GUIDE WIRE: HCPCS | Performed by: ORTHOPAEDIC SURGERY

## 2023-08-14 PROCEDURE — C1713 ANCHOR/SCREW BN/BN,TIS/BN: HCPCS | Performed by: ORTHOPAEDIC SURGERY

## 2023-08-14 PROCEDURE — 25010000002 HYDROMORPHONE 1 MG/ML SOLUTION: Performed by: NURSE ANESTHETIST, CERTIFIED REGISTERED

## 2023-08-14 PROCEDURE — 4A11X4G MONITORING OF PERIPHERAL NERVOUS ELECTRICAL ACTIVITY, INTRAOPERATIVE, EXTERNAL APPROACH: ICD-10-PCS | Performed by: ORTHOPAEDIC SURGERY

## 2023-08-14 PROCEDURE — 25010000002 ONDANSETRON PER 1 MG: Performed by: NURSE ANESTHETIST, CERTIFIED REGISTERED

## 2023-08-14 PROCEDURE — 63710000001 INSULIN LISPRO (HUMAN) PER 5 UNITS: Performed by: PHYSICIAN ASSISTANT

## 2023-08-14 PROCEDURE — 25010000002 FENTANYL CITRATE (PF) 100 MCG/2ML SOLUTION: Performed by: NURSE ANESTHETIST, CERTIFIED REGISTERED

## 2023-08-14 PROCEDURE — 97168 OT RE-EVAL EST PLAN CARE: CPT

## 2023-08-14 PROCEDURE — 72100 X-RAY EXAM L-S SPINE 2/3 VWS: CPT

## 2023-08-14 PROCEDURE — 25010000002 CEFAZOLIN PER 500 MG: Performed by: ORTHOPAEDIC SURGERY

## 2023-08-14 PROCEDURE — 25010000002 PROPOFOL 10 MG/ML EMULSION: Performed by: NURSE ANESTHETIST, CERTIFIED REGISTERED

## 2023-08-14 DEVICE — TI, MIS LORDOTIC ROD, VI2, 5.5MM X 45MM
Type: IMPLANTABLE DEVICE | Site: SPINE LUMBAR | Status: FUNCTIONAL
Brand: INVICTUS

## 2023-08-14 DEVICE — KT HEMOST ABS SURGIFOAM PORCN 1GRAM: Type: IMPLANTABLE DEVICE | Site: SPINE LUMBAR | Status: FUNCTIONAL

## 2023-08-14 DEVICE — CANNULATED EXTENDED TAB POLYAXIAL REDUCTION SCREW, 6.5 MM X 45 MM
Type: IMPLANTABLE DEVICE | Site: SPINE LUMBAR | Status: FUNCTIONAL
Brand: INVICTUS

## 2023-08-14 DEVICE — SET SCREW
Type: IMPLANTABLE DEVICE | Site: SPINE LUMBAR | Status: FUNCTIONAL
Brand: INVICTUS

## 2023-08-14 RX ORDER — HYDROMORPHONE HYDROCHLORIDE 1 MG/ML
0.5 INJECTION, SOLUTION INTRAMUSCULAR; INTRAVENOUS; SUBCUTANEOUS
Status: DISCONTINUED | OUTPATIENT
Start: 2023-08-14 | End: 2023-08-14 | Stop reason: HOSPADM

## 2023-08-14 RX ORDER — DROPERIDOL 2.5 MG/ML
0.62 INJECTION, SOLUTION INTRAMUSCULAR; INTRAVENOUS ONCE AS NEEDED
Status: DISCONTINUED | OUTPATIENT
Start: 2023-08-14 | End: 2023-08-14 | Stop reason: HOSPADM

## 2023-08-14 RX ORDER — OXYCODONE HCL 20 MG/1
20 TABLET, FILM COATED, EXTENDED RELEASE ORAL ONCE
Status: COMPLETED | OUTPATIENT
Start: 2023-08-14 | End: 2023-08-14

## 2023-08-14 RX ORDER — SODIUM CHLORIDE, SODIUM LACTATE, POTASSIUM CHLORIDE, CALCIUM CHLORIDE 600; 310; 30; 20 MG/100ML; MG/100ML; MG/100ML; MG/100ML
100 INJECTION, SOLUTION INTRAVENOUS CONTINUOUS
Status: DISCONTINUED | OUTPATIENT
Start: 2023-08-14 | End: 2023-08-15 | Stop reason: HOSPADM

## 2023-08-14 RX ORDER — SODIUM CHLORIDE 9 MG/ML
75 INJECTION, SOLUTION INTRAVENOUS CONTINUOUS
Status: DISPENSED | OUTPATIENT
Start: 2023-08-14 | End: 2023-08-15

## 2023-08-14 RX ORDER — FENTANYL CITRATE 50 UG/ML
INJECTION, SOLUTION INTRAMUSCULAR; INTRAVENOUS AS NEEDED
Status: DISCONTINUED | OUTPATIENT
Start: 2023-08-14 | End: 2023-08-14 | Stop reason: SURG

## 2023-08-14 RX ORDER — LABETALOL HYDROCHLORIDE 5 MG/ML
5 INJECTION, SOLUTION INTRAVENOUS
Status: DISCONTINUED | OUTPATIENT
Start: 2023-08-14 | End: 2023-08-14 | Stop reason: HOSPADM

## 2023-08-14 RX ORDER — OXYCODONE AND ACETAMINOPHEN 10; 325 MG/1; MG/1
1 TABLET ORAL ONCE AS NEEDED
Status: COMPLETED | OUTPATIENT
Start: 2023-08-14 | End: 2023-08-14

## 2023-08-14 RX ORDER — LIDOCAINE HYDROCHLORIDE 20 MG/ML
INJECTION, SOLUTION EPIDURAL; INFILTRATION; INTRACAUDAL; PERINEURAL AS NEEDED
Status: DISCONTINUED | OUTPATIENT
Start: 2023-08-14 | End: 2023-08-14 | Stop reason: SURG

## 2023-08-14 RX ORDER — ONDANSETRON 2 MG/ML
4 INJECTION INTRAMUSCULAR; INTRAVENOUS
Status: DISCONTINUED | OUTPATIENT
Start: 2023-08-14 | End: 2023-08-14 | Stop reason: HOSPADM

## 2023-08-14 RX ORDER — SUCCINYLCHOLINE/SOD CL,ISO/PF 200MG/10ML
SYRINGE (ML) INTRAVENOUS AS NEEDED
Status: DISCONTINUED | OUTPATIENT
Start: 2023-08-14 | End: 2023-08-14 | Stop reason: SURG

## 2023-08-14 RX ORDER — ROCURONIUM BROMIDE 10 MG/ML
INJECTION, SOLUTION INTRAVENOUS AS NEEDED
Status: DISCONTINUED | OUTPATIENT
Start: 2023-08-14 | End: 2023-08-14 | Stop reason: SURG

## 2023-08-14 RX ORDER — NALOXONE HCL 0.4 MG/ML
0.04 VIAL (ML) INJECTION AS NEEDED
Status: DISCONTINUED | OUTPATIENT
Start: 2023-08-14 | End: 2023-08-14 | Stop reason: HOSPADM

## 2023-08-14 RX ORDER — SODIUM CHLORIDE 9 MG/ML
40 INJECTION, SOLUTION INTRAVENOUS AS NEEDED
Status: DISCONTINUED | OUTPATIENT
Start: 2023-08-14 | End: 2023-08-14 | Stop reason: HOSPADM

## 2023-08-14 RX ORDER — MAGNESIUM HYDROXIDE 1200 MG/15ML
LIQUID ORAL AS NEEDED
Status: DISCONTINUED | OUTPATIENT
Start: 2023-08-14 | End: 2023-08-14 | Stop reason: HOSPADM

## 2023-08-14 RX ORDER — SODIUM CHLORIDE 0.9 % (FLUSH) 0.9 %
3 SYRINGE (ML) INJECTION EVERY 12 HOURS SCHEDULED
Status: DISCONTINUED | OUTPATIENT
Start: 2023-08-14 | End: 2023-08-14 | Stop reason: HOSPADM

## 2023-08-14 RX ORDER — FLUMAZENIL 0.1 MG/ML
0.2 INJECTION INTRAVENOUS AS NEEDED
Status: DISCONTINUED | OUTPATIENT
Start: 2023-08-14 | End: 2023-08-14 | Stop reason: HOSPADM

## 2023-08-14 RX ORDER — ACETAMINOPHEN 500 MG
1000 TABLET ORAL ONCE
Status: COMPLETED | OUTPATIENT
Start: 2023-08-14 | End: 2023-08-14

## 2023-08-14 RX ORDER — MIDAZOLAM HYDROCHLORIDE 1 MG/ML
0.5 INJECTION INTRAMUSCULAR; INTRAVENOUS
Status: DISCONTINUED | OUTPATIENT
Start: 2023-08-14 | End: 2023-08-14 | Stop reason: HOSPADM

## 2023-08-14 RX ORDER — FENTANYL CITRATE 50 UG/ML
25 INJECTION, SOLUTION INTRAMUSCULAR; INTRAVENOUS
Status: DISCONTINUED | OUTPATIENT
Start: 2023-08-14 | End: 2023-08-14 | Stop reason: HOSPADM

## 2023-08-14 RX ORDER — IBUPROFEN 600 MG/1
600 TABLET ORAL ONCE AS NEEDED
Status: DISCONTINUED | OUTPATIENT
Start: 2023-08-14 | End: 2023-08-14 | Stop reason: HOSPADM

## 2023-08-14 RX ORDER — LIDOCAINE HYDROCHLORIDE 10 MG/ML
0.5 INJECTION, SOLUTION EPIDURAL; INFILTRATION; INTRACAUDAL; PERINEURAL ONCE AS NEEDED
Status: DISCONTINUED | OUTPATIENT
Start: 2023-08-14 | End: 2023-08-14 | Stop reason: HOSPADM

## 2023-08-14 RX ORDER — PROPOFOL 10 MG/ML
VIAL (ML) INTRAVENOUS AS NEEDED
Status: DISCONTINUED | OUTPATIENT
Start: 2023-08-14 | End: 2023-08-14 | Stop reason: SURG

## 2023-08-14 RX ORDER — ONDANSETRON 2 MG/ML
INJECTION INTRAMUSCULAR; INTRAVENOUS AS NEEDED
Status: DISCONTINUED | OUTPATIENT
Start: 2023-08-14 | End: 2023-08-14 | Stop reason: SURG

## 2023-08-14 RX ORDER — SODIUM CHLORIDE 0.9 % (FLUSH) 0.9 %
3-10 SYRINGE (ML) INJECTION AS NEEDED
Status: DISCONTINUED | OUTPATIENT
Start: 2023-08-14 | End: 2023-08-14 | Stop reason: HOSPADM

## 2023-08-14 RX ADMIN — PROPOFOL 150 MG: 10 INJECTION, EMULSION INTRAVENOUS at 07:14

## 2023-08-14 RX ADMIN — Medication 100 MG: at 07:14

## 2023-08-14 RX ADMIN — OXYCODONE AND ACETAMINOPHEN 1 TABLET: 10; 325 TABLET ORAL at 08:40

## 2023-08-14 RX ADMIN — INSULIN LISPRO 2 UNITS: 100 INJECTION, SOLUTION INTRAVENOUS; SUBCUTANEOUS at 17:32

## 2023-08-14 RX ADMIN — SODIUM CHLORIDE 75 ML/HR: 9 INJECTION, SOLUTION INTRAVENOUS at 10:30

## 2023-08-14 RX ADMIN — DOCUSATE SODIUM 50 MG AND SENNOSIDES 8.6 MG 1 TABLET: 8.6; 5 TABLET, FILM COATED ORAL at 20:47

## 2023-08-14 RX ADMIN — FENTANYL CITRATE 100 MCG: 50 INJECTION, SOLUTION INTRAMUSCULAR; INTRAVENOUS at 07:14

## 2023-08-14 RX ADMIN — METFORMIN HCL 1000 MG: 500 TABLET ORAL at 17:32

## 2023-08-14 RX ADMIN — OXYCODONE HYDROCHLORIDE 20 MG: 20 TABLET, FILM COATED, EXTENDED RELEASE ORAL at 06:59

## 2023-08-14 RX ADMIN — SODIUM CHLORIDE 1000 MG: 900 INJECTION INTRAVENOUS at 23:22

## 2023-08-14 RX ADMIN — PROPAFENONE HYDROCHLORIDE 300 MG: 150 TABLET, FILM COATED ORAL at 21:11

## 2023-08-14 RX ADMIN — HYDROMORPHONE HYDROCHLORIDE 0.5 MG: 1 INJECTION, SOLUTION INTRAMUSCULAR; INTRAVENOUS; SUBCUTANEOUS at 08:00

## 2023-08-14 RX ADMIN — HYDROMORPHONE HYDROCHLORIDE 0.5 MG: 1 INJECTION, SOLUTION INTRAMUSCULAR; INTRAVENOUS; SUBCUTANEOUS at 08:10

## 2023-08-14 RX ADMIN — OXYCODONE AND ACETAMINOPHEN 2 TABLET: 7.5; 325 TABLET ORAL at 23:22

## 2023-08-14 RX ADMIN — CEFAZOLIN 2000 MG: 2 INJECTION, POWDER, FOR SOLUTION INTRAMUSCULAR; INTRAVENOUS at 07:14

## 2023-08-14 RX ADMIN — OXYCODONE AND ACETAMINOPHEN 2 TABLET: 7.5; 325 TABLET ORAL at 10:30

## 2023-08-14 RX ADMIN — ACETAMINOPHEN 1000 MG: 500 TABLET, FILM COATED ORAL at 06:59

## 2023-08-14 RX ADMIN — PROPAFENONE HYDROCHLORIDE 300 MG: 150 TABLET, FILM COATED ORAL at 13:42

## 2023-08-14 RX ADMIN — ROCURONIUM BROMIDE 10 MG: 10 SOLUTION INTRAVENOUS at 07:14

## 2023-08-14 RX ADMIN — LIDOCAINE HYDROCHLORIDE 100 MG: 20 INJECTION, SOLUTION EPIDURAL; INFILTRATION; INTRACAUDAL; PERINEURAL at 07:14

## 2023-08-14 RX ADMIN — SODIUM CHLORIDE, POTASSIUM CHLORIDE, SODIUM LACTATE AND CALCIUM CHLORIDE 100 ML/HR: 600; 310; 30; 20 INJECTION, SOLUTION INTRAVENOUS at 23:23

## 2023-08-14 RX ADMIN — POLYETHYLENE GLYCOL 3350 17 G: 17 POWDER, FOR SOLUTION ORAL at 20:47

## 2023-08-14 RX ADMIN — Medication 3 ML: at 20:47

## 2023-08-14 RX ADMIN — SODIUM CHLORIDE, POTASSIUM CHLORIDE, SODIUM LACTATE AND CALCIUM CHLORIDE: 600; 310; 30; 20 INJECTION, SOLUTION INTRAVENOUS at 07:00

## 2023-08-14 RX ADMIN — SODIUM CHLORIDE 1000 MG: 900 INJECTION INTRAVENOUS at 14:23

## 2023-08-14 RX ADMIN — OXYCODONE AND ACETAMINOPHEN 2 TABLET: 7.5; 325 TABLET ORAL at 19:25

## 2023-08-14 RX ADMIN — ONDANSETRON 4 MG: 2 INJECTION INTRAMUSCULAR; INTRAVENOUS at 07:50

## 2023-08-14 NOTE — PROGRESS NOTES
Family Medicine Progress Note    Patient:  Bella Ordaz  YOB: 1955    MRN: [id number]     Acct: [id number]     Admit date: 8/11/2023    Patient Seen, Chart, Consults notes, Labs, Radiology studies reviewed.    Subjective: Day 3 of stay with lumbosacral disc disease with sciatica status post second part of two-stage decompression surgery and most recent (in last 24 hours) has had no new complaints.  Had second part this morning and has done well.  Had almost immediate relief of his radicular pain after stage I on Friday.  Has been up and ambulatory with physical therapy today.    Past, Family, Social History unchanged from admission.    Diet: Diet: Regular/House Diet; Texture: Regular Texture (IDDSI 7); Fluid Consistency: Thin (IDDSI 0)    Medications:  Scheduled Meds:atorvastatin, 40 mg, Oral, Daily  ceFAZolin, 1,000 mg, Intravenous, Q8H  empagliflozin, 25 mg, Oral, Daily  losartan, 100 mg, Oral, Q24H   And  hydroCHLOROthiazide, 12.5 mg, Oral, Q24H  insulin lispro, 2-9 Units, Subcutaneous, 4x Daily AC & at Bedtime  metFORMIN, 1,000 mg, Oral, BID With Meals  polyethylene glycol, 17 g, Oral, BID  propafenone, 300 mg, Oral, Q8H  senna-docusate sodium, 1 tablet, Oral, BID  sodium chloride, 3 mL, Intravenous, Q12H      Continuous Infusions:lactated ringers, 100 mL/hr  lactated ringers, 9 mL/hr, Last Rate: 50 mL/hr (08/14/23 0700)  lactated ringers, 100 mL/hr, Last Rate: 50 mL/hr (08/14/23 0700)  sodium chloride, 100 mL/hr, Last Rate: 100 mL/hr (08/11/23 1424)  sodium chloride, 75 mL/hr, Last Rate: 75 mL/hr (08/14/23 1030)      PRN Meds:  acetaminophen    cyclobenzaprine    dextrose    dextrose    glucagon (human recombinant)    HYDROmorphone **AND** naloxone    lactated ringers    ondansetron **OR** ondansetron    oxyCODONE-acetaminophen    oxyCODONE-acetaminophen    sodium chloride    sodium chloride    Objective:    Lab Results (last 24 hours)       Procedure Component Value Units Date/Time    POC  Glucose Once [[id number]]  (Abnormal) Collected: 08/14/23 1647    Specimen: Blood Updated: 08/14/23 1658     Glucose 188 mg/dL      Comment: : [id number] Radha ChristinaMeter ID: [id number]       POC Glucose Once [[id number]]  (Normal) Collected: 08/14/23 1133    Specimen: Blood Updated: 08/14/23 1144     Glucose 110 mg/dL      Comment: : [id number] Radha ChristinaMeter ID: [id number]       POC Glucose Once [[id number]]  (Normal) Collected: 08/14/23 0824    Specimen: Blood Updated: 08/14/23 0836     Glucose 128 mg/dL      Comment: : [id number] Luis LoriMeter ID: [id number]       POC Glucose Once [[id number]]  (Abnormal) Collected: 08/13/23 2027    Specimen: Blood Updated: 08/13/23 2038     Glucose 159 mg/dL      Comment: : [id number] Jay CheyenneMeter ID: [id number]                Imaging Results (Last 72 Hours)       Procedure Component Value Units Date/Time    XR Spine Lumbar AP & Lateral [[id number]] Collected: 08/14/23 0759     Updated: 08/14/23 0804    Narrative:      EXAMINATION: XR SPINE LUMBAR AP AND LATERAL- 8/14/2023 7:59 AM CDT     HISTORY: posterior fusion; Z74.09-Other reduced mobility.     Prostate time: 18.4 seconds.     Radiation dose: 3.6962 Gycm2 (Air Kerma).     Number fluoroscopic images recorded: 2.     REPORT: AP and lateral intraoperative fluoroscopic spot views at the  lumbosacral junction were obtained, under the supervision of the  orthopedic surgery service, no radiologist was present for image  acquisition. Images demonstrate anterior posterior instrumented fusion  at L5-S1, the hardware appears to be in satisfactory position. Images  are stored in PACS for review.  This report was finalized on 08/14/2023 08:00 by Dr. Rico Adams MD.    FL C Arm During Surgery [[id number]] Resulted: 08/14/23 0751     Updated: 08/14/23 0751    Narrative:      This procedure was auto-finalized with no dictation required.             Physical Exam:    Vitals: /56 (BP Location:  "Right arm, Patient Position: Sitting)   Pulse 77   Temp 97.5 øF (36.4 øC) (Oral)   Resp 15   Ht 185 cm (72.84\")   Wt 119 kg (262 lb 5.6 oz)   SpO2 98%   BMI 34.77 kg/mý   24 hour intake/output:  Intake/Output Summary (Last 24 hours) at 8/14/2023 1843  Last data filed at 8/14/2023 1612  Gross per 24 hour   Intake 1340 ml   Output 1300 ml   Net 40 ml     Last 3 weights:  Wt Readings from Last 3 Encounters:   08/11/23 119 kg (262 lb 5.6 oz)   08/10/23 122 kg (268 lb)   08/10/23 122 kg (268 lb 1.3 oz)       General Appearance alert, appears stated age, cooperative, and overweight  Head normocephalic, without obvious abnormality and atraumatic  Eyes lids and lashes normal, conjunctivae and sclerae normal, and no icterus  Neck supple and trachea midline  Lungs clear to auscultation, respirations regular, respirations even, and respirations unlabored  Heart regular rhythm & normal rate and normal S1, S2  Abdomen normal bowel sounds, soft non-tender, and no guarding  Extremities no edema and no cyanosis  Skin no bleeding, bruising or rash  Neurologic Mental Status orientated to person, place, time and situation        Assessment:      Lumbago of multiple sites in spine with sciatica    Primary hypertension    Paroxysmal atrial fibrillation    Type 2 diabetes mellitus without complication, without long-term current use of insulin    Mixed hyperlipidemia    Lumbosacral disc disease    Chronic bilateral low back pain with bilateral sciatica    Lumbar radiculopathy          Plan:  Continue routine postoperative care.  Medically is remaining stable.  Given current response to surgery anticipate possible discharge home tomorrow.      Electronically signed by Geovanny Olivarez MD on 8/14/2023 at 18:43 CDT            "

## 2023-08-14 NOTE — OP NOTE
LUMBAR LAMINECTOMY POSTERIOR LUMBAR INTERBODY FUSION  Procedure Note    Bella Ordaz  8/14/2023    Pre-op Diagnosis:     1. Status post left L5-S1 hemilaminotomy, foraminotomy, decompression, 05/15/2013.   2. Mild chronic low back pain.  3. Recurrent left buttock, thigh and leg radiculopathy.  4. Neurogenic claudication.  5. Multilevel lumbar degenerative disc disease.   6. Multilevel lumbar facet arthropathy.  7. Small central disc herniation L2-3.   8. Mild spondylolisthesis, L4-5.   9. Central stenosis L2-3.   10. Severe foraminal stenosis, left L5-S1.  11.  Status post anterior decompression, ALIF with instrumentation L5-S1, 8/11/2023    Post-op Diagnosis:    same    Procedure/CPTr Codes:    1.  Posterior spinal fusion L5-S1  2.  Posterior spinal instrumentation L5-S1 (ATEC pedicle screws and lara)  3.  Use of locally obtained autograft bone for fusion  4.  Use of fluoroscopy for confirmation of surgical level and placement of instrumentation  5.  Intraoperative neural monitoring with pedicle screw stimulation    Anesthesia: General    Surgeon: BONIFACIO Devi MD    Assistant: Christopher Andre PA-C    Estimated Blood Loss: minimal    Complications: None    Condition: Stable to PACU.    Indications:    The patient is a 68-year-old who sees Dr. Geovanny Olivarez for medical issues. He originally presented to the office in 2013 with left leg radiculopathy. We elected to proceed with a left L5-S1 hemilaminotomy and foraminotomies decompression which was performed on 5/15/2013. The patient did reasonably well for a period of time but presented back to the office with mild chronic low back pain and a recurrence of his left leg radiculopathy as well as symptoms consistent with neurogenic claudication. Repeat imaging studies revealed multilevel lumbar degenerative disc disease and facet arthropathy with a small central disc herniation causing central stenosis at L2-3, a mild spondylolisthesis at L4-5, but more importantly,  severe foraminal stenosis once again on the left side of L5-S1. It was felt that the stenosis at L5-S1 was contributing to his recurrent symptoms, however it is possible he may require additional surgery at the L2-3 level at a later date.      After failing all conservative measures, it was mutually decided that surgery would be the best option. Risks, benefits, and complications of surgery were discussed in detail. The patient appeared well informed and wished to proceed. We specifically discussed the risk of infection, blood loss, nerve root injury, CSF leak, and the possibility of incomplete resolution of symptoms. We also discussed the possible risk of a nonunion and the potential need for additional surgery in the event of a pseudoarthrosis or hardware failure.      We elected proceed with a staged operation.  Previously on 8/11/2023, the patient underwent an anterior decompression and fusion with instrumentation of L5-S1.  Today we return to surgery for the second posterior stage of the procedure.     Operative Procedure:    After obtaining informed consent and verifying the correct operative site, the patient was brought to the operating room. A general anesthetic was provided by the anesthesia service with the assistance of an endotracheal tube. Once this was appropriately positioned and secured, the patient was carefully rotated prone onto a Nik frame. All bony processes were well-padded. The lumbar region was prepped and draped in usual sterile fashion. A surgical timeout was taken to confirm this was the correct patient, we were working at the correct level, and that preoperative antibiotics were given in a timely fashion.    Using fluoroscopy for guidance, a right sided [place] incision was created overlying the L5-S1 segment using a 10 blade scalpel.  Dissection was carried through subcutaneous tissues using Bovie cautery.  The lumbar fascia was divided in line with the incision and blunt dissection  was carried between the multifidus and the longissimus down to the facet joint of L5-S1.  Dissection was carried laterally exposing the transverse process of L5 and the sacral ala.  We then exposed the L5-S1 facet joint.  The capsule was destroyed using Bovie cautery exposing as much bone as possible.  The high-speed bur was then used to decorticate the facet joint, destroying as much of the facet cartilage as possible.  I also decorticated the lateral pars region and the tranverse process.  The locally obtained autograft bone was left in situ in the posterolateral gutter.  This constituted a posterior fusion of L5-S1.    Next under fluoroscopic guidance, Jamshidi needles were used to cannulate the pedicles of L5 and S1.  Once the needles were properly positioned in the pedicles, guidewires were placed to maintain position.  Over each of the guidewires, an [facility] pedicle screw was placed.  We used 6.5 mm x 45 mm screws into each of the pedicles.  I then used a neuro monitoring probe to stimulate the screws themselves confirming appropriate position ensuring no breach of the pedicle.  After confirming the screws were properly positioned, an appropriate-sized lara was chosen to span the pedicle screws.  The lara was tightened into position using set screws.  The setscrews were tightened using the appropriate antitorque wrench and torque limiting screwdriver provided by [facility].    The wound  was then thoroughly irrigated and an inspection was then undertaken to ensure that we had adequate hemostasis.  Bleeding at this point was controlled using thrombin with Gelfoam powder and bipolar cautery.  Final fluoroscopy imaging confirmed adequate position of the newly placed posterior instrumentation.    Wound closure was then accomplished by reapproximating the fascia with a #1 Vicryl.  Immediate subcutaneous tissues were closed using a 2-0 Vicryl and skin closure was augmented using Mastisol and Steri-Strips.  The incision was  then washed and sterilely dressed with a Bioclusive dressing.    The patient was then carefully rotated supine onto a hospital gurKetchum, extubated, and sent to the recovery room in good stable condition.  The patient tolerated the procedure well.  There were no complications.  We estimated blood loss to be minimal.    Intraoperative neuro monitoring was ordered and carried out throughout the procedure to add an increased level of safety for the patient.  The interpreting physician was available by means of real-time continuous, bidirectional, remote audio and visual communication as needed throughout the entire procedure.  Modalities used during the procedure included SSEP, EEG, EMG, TOF, and pedicle screw stimulation.  There were no neuro monitoring signal changes during the procedure.    Christopher Andre PA-C provided critical assistance during the procedure.  His assistance was medically necessary in order to allow the procedure to occur in the most safe and efficient manner.    BONIFACIO Devi MD     Date: 8/14/2023  Time: 10:36 CDT

## 2023-08-14 NOTE — PLAN OF CARE
Problem: Adult Inpatient Plan of Care  Goal: Plan of Care Review  Recent Flowsheet Documentation  Taken 8/14/2023 1303 by Yoseph Puri, PT  Plan of Care Reviewed With: patient  Outcome Evaluation: PT reevaluation complete.  A&Ox4.  C/o 6/10 LBP.  Pt is SBA log rolling and bed mobility.  CGA sit<>stand.  Ambulated 300ft CGA x1 w/ RW.  Donned LSO with min A.  Doffed LSO independent.  Resume previous POC.  Recommend home w/ assist at DC.  Thank you for referral.   Goal Outcome Evaluation:  Plan of Care Reviewed With: patient           Outcome Evaluation: PT reevaluation complete.  A&Ox4.  C/o 6/10 LBP.  Pt is SBA log rolling and bed mobility.  CGA sit<>stand.  Ambulated 300ft CGA x1 w/ RW.  Donned LSO with min A.  Doffed LSO independent.  Resume previous POC.  Recommend home w/ assist at DC.  Thank you for referral.      Anticipated Discharge Disposition (PT): home with assist

## 2023-08-14 NOTE — ANESTHESIA POSTPROCEDURE EVALUATION
"Patient: Bella Ordaz    Procedure Summary       Date: 08/14/23 Room / Location: [facility] OR  /  [facility] OR    Anesthesia Start: 0704 Anesthesia Stop: 0819    Procedure: POSTERIOR SPINAL FUSION WITH INSTRUMENTATION L5-S1 (Spine Lumbar) Diagnosis: (M54.16)    Surgeons: GRETCHEN Devi MD Provider: David Brooks CRNA    Anesthesia Type: general ASA Status: 2            Anesthesia Type: general    Vitals  Vitals Value Taken Time   /59 08/14/23 0849   Temp 98 øF (36.7 øC) 08/14/23 0849   Pulse 72 08/14/23 0856   Resp 11 08/14/23 0849   SpO2 90 % 08/14/23 0856   Vitals shown include unvalidated device data.        Post Anesthesia Care and Evaluation    Patient location during evaluation: PACU  Patient participation: complete - patient participated  Level of consciousness: awake and alert  Pain management: adequate    Airway patency: patent  Anesthetic complications: No anesthetic complications    Cardiovascular status: acceptable  Respiratory status: acceptable  Hydration status: acceptable    Comments: Blood pressure 120/59, pulse 75, temperature 98 øF (36.7 øC), resp. rate 11, height 185 cm (72.84\"), weight 119 kg (262 lb 5.6 oz), SpO2 95 %.    Pt discharged from PACU based on giselle score >8    "

## 2023-08-14 NOTE — THERAPY RE-EVALUATION
Patient Name: Bella Ordaz  : 1955    MRN: [id number]                              Today's Date: 2023       Admit Date: 2023    Visit Dx:     ICD-10-CM ICD-9-CM   1. Impaired mobility [Z74.09 (ICD-10-CM)]  Z74.09 799.89     Patient Active Problem List   Diagnosis    Primary hypertension    Paroxysmal atrial fibrillation    Type 2 diabetes mellitus without complication, without long-term current use of insulin    Mixed hyperlipidemia    Lumbosacral disc disease    Chronic bilateral low back pain with bilateral sciatica    Lumbago of multiple sites in spine with sciatica    Lumbar radiculopathy     Past Medical History:   Diagnosis Date    Arthritis     Chronic left-sided low back pain     radiates down left leg    Diabetes mellitus     Facet arthropathy, lumbar     Hypertension     Pulmonary embolism 10/2022    pt states 2-3 blood clots in the lung    Sleep apnea     pt states doesn't really use his cpap machine     Past Surgical History:   Procedure Laterality Date    APPENDECTOMY      as a kid    CYST REMOVAL      tailbone    HAND SURGERY Left     nerve/tendon repair    KNEE ARTHROSCOPY Bilateral     x2 left, x1 right    LAPAROSCOPIC CHOLECYSTECTOMY      REPLACEMENT TOTAL KNEE Left 2023    SPINAL CORD DECOMPRESSION        General Information       Row Name 23 1303          Physical Therapy Time and Intention    Document Type re-evaluation  Sx:  PSF w/ HW L5-S1 23  -     Mode of Treatment co-treatment;physical therapy  -       Row Name 23 1303          General Information    Patient Profile Reviewed yes  -     Existing Precautions/Restrictions brace worn when out of bed;LSO;spinal;fall  -     Barriers to Rehab none identified  -       Row Name 23 1303          Cognition    Orientation Status (Cognition) oriented x 4  -       Row Name 23 1303          Safety Issues, Functional Mobility    Safety Issues Affecting Function (Mobility) ability to follow  commands  -     Impairments Affecting Function (Mobility) balance;pain  -               User Key  (r) = Recorded By, (t) = Taken By, (c) = Cosigned By      Initials Name Provider Type     Yoseph Puri, PT Physical Therapist                   Mobility       [facility] Name 08/14/23 1303          Bed Mobility    Bed Mobility rolling right;sidelying-sit-sidelying  -     Rolling Right McCormick (Bed Mobility) standby assist  -     Sidelying-Sit-Sidelying McCormick (Bed Mobility) standby assist  -     Assistive Device (Bed Mobility) head of bed elevated  -[facility] Name 08/14/23 1303          Sit-Stand Transfer    Sit-Stand McCormick (Transfers) contact guard  -[facility] Name 08/14/23 1303          Gait/Stairs (Locomotion)    McCormick Level (Gait) contact guard  -     Assistive Device (Gait) walker, front-wheeled  -     Distance in Feet (Gait) 300  -               User Key  (r) = Recorded By, (t) = Taken By, (c) = Cosigned By      Initials Name Provider Type     Yoseph Puri, PT Physical Therapist                   Obj/Interventions       Row Name 08/14/23 1303          Range of Motion Comprehensive    General Range of Motion bilateral upper extremity ROM WFL;bilateral lower extremity ROM WFL  -[facility] Name 08/14/23 1303          Strength Comprehensive (MMT)    General Manual Muscle Testing (MMT) Assessment no strength deficits identified  -LH       Row Name 08/14/23 1303          Sensory Assessment (Somatosensory)    Sensory Assessment (Somatosensory) sensation intact  -               User Key  (r) = Recorded By, (t) = Taken By, (c) = Cosigned By      Initials Name Provider Type     Yoseph Puri, PT Physical Therapist                   Goals/Plan       [facility] Name 08/14/23 1303          Bed Mobility Goal 1 (PT)    Progress/Outcomes (Bed Mobility Goal 1, PT) continuing progress toward goal  -[facility] Name 08/14/23 1303          Transfer Goal 1 (PT)    Progress/Outcome  (Transfer Goal 1, PT) continuing progress toward goal  -       Row Name 08/14/23 1303          Gait Training Goal 1 (PT)    Progress/Outcome (Gait Training Goal 1, PT) continuing progress toward goal  -               User Key  (r) = Recorded By, (t) = Taken By, (c) = Cosigned By      Initials Name Provider Type     Yoseph Puri, PT Physical Therapist                   Clinical Impression       Row Name 08/14/23 1303          Pain    Pretreatment Pain Rating 6/10  -     Posttreatment Pain Rating 6/10  -     Pain Location lower  -     Pain Location - back  -     Pain Intervention(s) Medication (See MAR);Repositioned;Ambulation/increased activity  -       Row Name 08/14/23 1303          Plan of Care Review    Plan of Care Reviewed With patient  -     Outcome Evaluation PT reevaluation complete.  A&Ox4.  C/o 6/10 LBP.  Pt is SBA log rolling and bed mobility.  CGA sit<>stand.  Ambulated 300ft CGA x1 w/ RW.  Donned LSO with min A.  Doffed LSO independent.  Resume previous POC.  Recommend home w/ assist at [place].  Thank you for referral.  -[facility] Name 08/14/23 1303          Therapy Assessment/Plan (PT)    Patient/Family Therapy Goals Statement (PT) return home  -     Rehab Potential (PT) good, to achieve stated therapy goals  -     Criteria for Skilled Interventions Met (PT) yes;skilled treatment is necessary  [facility]     Therapy Frequency (PT) 2 times/day  -     Predicted Duration of Therapy Intervention (PT) until dc  -[facility] Name 08/14/23 1303          Positioning and Restraints    In Bed fowlers;call light within reach;encouraged to call for assist;side rails up x2;with family/caregiver  -               User Key  (r) = Recorded By, (t) = Taken By, (c) = Cosigned By      Initials Name Provider Type     Yoseph Puri, PT Physical Therapist                   Outcome Measures       Row Name 08/14/23 1303 08/14/23 0961       How much help from another person do you currently need...     Turning from your back to your side while in flat bed without using bedrails? 4  - 4  -TB    Moving from lying on back to sitting on the side of a flat bed without bedrails? 4  - 3  -TB    Moving to and from a bed to a chair (including a wheelchair)? 4  - 3  -TB    Standing up from a chair using your arms (e.g., wheelchair, bedside chair)? 4  - 3  -TB    Climbing 3-5 steps with a railing? 3  - 3  -TB    To walk in hospital room? 3  - 3  -TB    AM-PAC 6 Clicks Score (PT) 22  - 19  -    Highest level of mobility 7 --> Walked 25 feet or more  - 6 --> Walked 10 steps or more  -      Row Name 08/14/23 1303          Functional Assessment    Outcome Measure Options AM-PAC 6 Clicks Basic Mobility (PT)  -               User Key  (r) = Recorded By, (t) = Taken By, (c) = Cosigned By      Initials Name Provider Type     Yoseph Puri, PT Physical Therapist    TB Destinee Adams RN Registered Nurse                                 Physical Therapy Education       Title: PT OT SLP Therapies (In Progress)       Topic: Physical Therapy (In Progress)       Point: Mobility training (Done)       Learning Progress Summary             Patient Acceptance, E,D, DU,VU by  at 8/14/2023 1337    Comment: benefits of PT and POC, call for A OOB, LSO when OOB    Acceptance, E, VU by MS at 8/11/2023 1514    Comment: role of PT in his care, spinal restrictions, use of LSO                         Point: Home exercise program (Not Started)       Learner Progress:  Not documented in this visit.              Point: Body mechanics (Done)       Learning Progress Summary             Patient Acceptance, E,D, DU,VU by  at 8/14/2023 1337    Comment: benefits of PT and POC, call for A OOB, LSO when OOB                         Point: Precautions (Done)       Learning Progress Summary             Patient Acceptance, E,D, DU,VU by  at 8/14/2023 1337    Comment: benefits of PT and POC, call for A OOB, LSO when OOB    Acceptance,  E, VU by MS at 8/11/2023 1514    Comment: role of PT in his care, spinal restrictions, use of LSO                                         User Key       Initials Effective Dates Name Provider Type Discipline     02/03/23 -  Yoseph Puri, PT Physical Therapist PT    MS 07/11/23 -  Miranda Edmond, PT, DPT, NCS Physical Therapist PT                  PT Recommendation and Plan     Plan of Care Reviewed With: patient  Outcome Evaluation: PT reevaluation complete.  A&Ox4.  C/o 6/10 LBP.  Pt is SBA log rolling and bed mobility.  CGA sit<>stand.  Ambulated 300ft CGA x1 w/ RW.  Donned LSO with min A.  Doffed LSO independent.  Resume previous POC.  Recommend home w/ assist at [place].  Thank you for referral.     Time Calculation:         PT Charges       Row Name 08/14/23 1338             Time Calculation    Start Time 1303  -      Stop Time 1333  -      Time Calculation (min) 30 min  -      PT Received On 08/14/23  -      PT Goal Re-Cert Due Date 08/24/23  -         Untimed Charges    PT Eval/Re-eval Minutes 30  -         Total Minutes    Untimed Charges Total Minutes 30  -       Total Minutes 30  -                User Key  (r) = Recorded By, (t) = Taken By, (c) = Cosigned By      Initials Name Provider Type     Yoseph Puri, PT Physical Therapist                  Therapy Charges for Today       Code Description Service Date Service Provider Modifiers Qty    25809983815 HC PT RE-EVAL ESTABLISHED PLAN 2 8/14/2023 Yoseph Puri, PT GP 1            PT G-Codes  Outcome Measure Options: AM-PAC 6 Clicks Basic Mobility (PT)  AM-PAC 6 Clicks Score (PT): 22  AM-PAC 6 Clicks Score (OT): 17  PT Discharge Summary  Anticipated Discharge Disposition (PT): home with assist    Yoseph Puri, PILAR  8/14/2023

## 2023-08-14 NOTE — ANESTHESIA PROCEDURE NOTES
Airway  Urgency: elective    Date/Time: 8/14/2023 7:16 AM  Airway not difficult    General Information and Staff    Patient location during procedure: OR  CRNA/CAA: David Brooks CRNA    Indications and Patient Condition  Indications for airway management: airway protection    Preoxygenated: yes  MILS maintained throughout  Mask difficulty assessment: 2 - vent by mask + OA or adjuvant +/- NMBA    Final Airway Details  Final airway type: endotracheal airway      Successful airway: ETT  Cuffed: yes   Successful intubation technique: video laryngoscopy  Facilitating devices/methods: intubating stylet  Endotracheal tube insertion site: oral  Blade: Addison  Blade size: 3  ETT size (mm): 8.0  Cormack-Lehane Classification: grade I - full view of glottis  Placement verified by: chest auscultation and capnometry   Measured from: teeth  ETT/EBT  to teeth (cm): 22  Number of attempts at approach: 1  Assessment: lips, teeth, and gum same as pre-op and atraumatic intubation    Additional Comments  Intubated by Sara FOSTER

## 2023-08-14 NOTE — ANESTHESIA PREPROCEDURE EVALUATION
Anesthesia Evaluation     Patient summary reviewed   no history of anesthetic complications:   NPO Solid Status: > 8 hours             Airway   Mallampati: II  TM distance: >3 FB  Neck ROM: full  Dental      Pulmonary    (+) pulmonary embolism, a smoker Former,sleep apnea on CPAP  (-) COPD, asthma  Cardiovascular   Exercise tolerance: good (4-7 METS)    ECG reviewed    (+) hypertensiondysrhythmias Paroxysmal Atrial Fib, hyperlipidemia  (-) pacemaker, past MI, angina, cardiac stents      Neuro/Psych  (-) seizures, TIA, CVA  GI/Hepatic/Renal/Endo    (+) obesity, diabetes mellitus  (-) GERD, liver disease, no renal disease    Musculoskeletal     Abdominal    Substance History      OB/GYN          Other                      Anesthesia Plan    ASA 2     general     intravenous induction     Anesthetic plan, risks, benefits, and alternatives have been provided, discussed and informed consent has been obtained with: patient.    CODE STATUS:    Code Status (Patient has no pulse and is not breathing): CPR (Attempt to Resuscitate)  Medical Interventions (Patient has pulse or is breathing): Full Support

## 2023-08-14 NOTE — PLAN OF CARE
Goal Outcome Evaluation:  Plan of Care Reviewed With: patient        Progress: no change  Outcome Evaluation: Pt A&Ox4. Up x1ast w/ LSO brace. Urinal. Insc w/ would vac in place. PPP. Denies n/t. C/o of pain w/ PRN pain meds given. Sugars monitored. NPO at midnight per orders. Call light in reach. Safety maintained. Will cont to monitor.

## 2023-08-14 NOTE — THERAPY RE-EVALUATION
Acute Care - Occupational Therapy Re-Evaluation  [facility]     Patient Name: Bella Ordaz  : 1955  MRN: [id number]  Today's Date: 2023     Date of Referral to OT: 23       Admit Date: 2023       ICD-10-CM ICD-9-CM   1. Impaired mobility [Z74.09 (ICD-10-CM)]  Z74.09 799.89     Patient Active Problem List   Diagnosis    Primary hypertension    Paroxysmal atrial fibrillation    Type 2 diabetes mellitus without complication, without long-term current use of insulin    Mixed hyperlipidemia    Lumbosacral disc disease    Chronic bilateral low back pain with bilateral sciatica    Lumbago of multiple sites in spine with sciatica    Lumbar radiculopathy     Past Medical History:   Diagnosis Date    Arthritis     Chronic left-sided low back pain     radiates down left leg    Diabetes mellitus     Facet arthropathy, lumbar     Hypertension     Pulmonary embolism 10/2022    pt states 2-3 blood clots in the lung    Sleep apnea     pt states doesn't really use his cpap machine     Past Surgical History:   Procedure Laterality Date    APPENDECTOMY      as a kid    CYST REMOVAL      tailbone    HAND SURGERY Left     nerve/tendon repair    KNEE ARTHROSCOPY Bilateral     x2 left, x1 right    LAPAROSCOPIC CHOLECYSTECTOMY      REPLACEMENT TOTAL KNEE Left 2023    SPINAL CORD DECOMPRESSION           OT ASSESSMENT FLOWSHEET (last 12 hours)       OT Evaluation and Treatment       Row Name 23 1306 23 0929                OT Time and Intention    Subjective Information complains of;pain  -AC --       Document Type re-evaluation  -AC --       Mode of Treatment occupational therapy  -AC --       Session Not Performed -- patient/family declined evaluation  -AC       Comment, Session Not Performed -- just got back from surgery, will check back later  -AC          General Information    Equipment Currently Used at Home --  reacher  -AC --       Pertinent History of Current Functional Problem s/p PSF  L5-S1 on 8/14/23  -AC --       Existing Precautions/Restrictions fall;LSO;brace worn when out of bed;spinal  -AC --       Barriers to Rehab none identified  -AC --          Pain Assessment    Pretreatment Pain Rating 6/10  -AC --       Posttreatment Pain Rating 6/10  -AC --       Pain Location lower  -AC --       Pain Location - back  -AC --       Pain Intervention(s) Medication (See MAR);Repositioned;Ambulation/increased activity  -AC --          Cognition    Orientation Status (Cognition) oriented x 4  -AC --       Personal Safety Interventions fall prevention program maintained;gait belt;nonskid shoes/slippers when out of bed;supervised activity  -AC --          Range of Motion Comprehensive    Comment, General Range of Motion not formally assessed, observed B shoulder flexion to 90 degrees, all other BUE joints WFL AROM  -AC --          Strength Comprehensive (MMT)    Comment, General Manual Muscle Testing (MMT) Assessment functionally 4+/5 BUE  -AC --          Activities of Daily Living    BADL Assessment/Intervention upper body dressing;lower body dressing  -AC --          Upper Body Dressing Assessment/Training    [place] Level (Upper Body Dressing) don;set up  LSO  -AC --       Position (Upper Body Dressing) edge of bed sitting  -AC --          Lower Body Dressing Assessment/Training    [place] Level (Lower Body Dressing) don;doff;socks;dependent (less than 25% patient effort)  -AC --       Position (Lower Body Dressing) edge of bed sitting  -AC --          BADL Safety/Performance    Impairments, BADL Safety/Performance range of motion;pain;balance  -AC --          Bed Mobility    Rolling Right [place] (Bed Mobility) standby assist  -AC --       Sidelying-Sit [place] (Bed Mobility) standby assist  -AC --       Sit-Sidelying [place] (Bed Mobility) standby assist  -AC --       Assistive Device (Bed Mobility) bed rails;head of bed elevated  -AC --          Functional Mobility     Functional Mobility- Ind. Level contact guard assist  -AC --       Functional Mobility- Device walker, front-wheeled  -AC --       Functional Mobility- Comment in hallway, back to bed  -AC --          Transfer Assessment/Treatment    Transfers sit-stand transfer;stand-sit transfer  -AC --          Sit-Stand Transfer    Sit-Stand [place] (Transfers) contact guard  -AC --          Stand-Sit Transfer    Stand-Sit [place] (Transfers) contact guard  -AC --          Safety Issues, Functional Mobility    Impairments Affecting Function (Mobility) balance;pain  -AC --          Balance    Balance Assessment sitting static balance;sitting dynamic balance;standing static balance;standing dynamic balance  -AC --       Static Sitting Balance independent  -AC --       Dynamic Sitting Balance independent  -AC --       Position, Sitting Balance sitting edge of bed  -AC --       Static Standing Balance contact guard  -AC --       Dynamic Standing Balance contact guard  -AC --       Position/Device Used, Standing Balance walker, front-wheeled  -AC --          Wound 08/11/23 0736 Left lower abdomen Incision    Wound - Properties Group Placement Date: 08/11/23  -SA Placement Time: 0736  -SA Present on Hospital Admission: N  -SA Side: Left  -SA Orientation: lower  -SA Location: abdomen  -SA Primary Wound Type: Incision  -SA    Retired Wound - Properties Group Placement Date: 08/11/23  -SA Placement Time: 0736  -SA Present on Hospital Admission: N  -SA Side: Left  -SA Orientation: lower  -SA Location: abdomen  -SA Primary Wound Type: Incision  -SA    Retired Wound - Properties Group Date first assessed: 08/11/23  -SA Time first assessed: 0736  -SA Present on Hospital Admission: N  -SA Side: Left  -SA Location: abdomen  -SA Primary Wound Type: Incision  -SA       Wound 08/14/23 0730 lumbar spine Incision    Wound - Properties Group Placement Date: 08/14/23  -DT Placement Time: 0730  -DT Present on Hospital Admission: N  -DT  Location: lumbar spine  -DT Primary Wound Type: Incision  -DT    Retired Wound - Properties Group Placement Date: 08/14/23  -DT Placement Time: 0730  -DT Present on Hospital Admission: N  -DT Location: lumbar spine  -DT Primary Wound Type: Incision  -DT    Retired Wound - Properties Group Date first assessed: 08/14/23  -DT Time first assessed: 0730  -DT Present on Hospital Admission: N  -DT Location: lumbar spine  -DT Primary Wound Type: Incision  -DT       NPWT (Negative Pressure Wound Therapy) 08/11/23 0903 left lower abdomen    NPWT (Negative Pressure Wound Therapy) - Properties Group Placement Date: 08/11/23 -SA Placement Time: 0903 -SA Location: left lower abdomen  -SA    Retired NPWT (Negative Pressure Wound Therapy) - Properties Group Placement Date: 08/11/23 -SA Placement Time: 0903 -SA Location: left lower abdomen  -SA    Retired NPWT (Negative Pressure Wound Therapy) - Properties Group Placement Date: 08/11/23 -SA Placement Time: 0903 -SA Location: left lower abdomen  -SA       Plan of Care Review    Plan of Care Reviewed With patient  -AC --       Progress no change  -AC --       Outcome Evaluation OT reeval completed.  Pt alert and oriented x4.  C/o 6/10 pain in lower back.  Came to EOB with SBA using log roll method and bed rails/HOB elevated.  Good sitting balance noted.  Pt donned LSO with set up but was dependent to don/doff socks due to pain and decreased L knee ROM.  Pt stood with CGA and ambulated in posey with walker.  Returned to supine in bed with SBA.  OT will continue to treat pt to address decreased ADL function.  Anticipate discharge home with assist.  -AC --          Positioning and Restraints    Pre-Treatment Position in bed  -AC --       Post Treatment Position bed  -AC --       In Bed fowlers;call light within reach;encouraged to call for assist;with family/caregiver;side rails up x2;SCD pump applied  -AC --          Therapy Assessment/Plan (OT)    Date of Referral to OT 08/14/23   -AC --       OT Diagnosis decreased adl  -AC --       Rehab Potential (OT) good, to achieve stated therapy goals  -AC --       Criteria for Skilled Therapeutic Interventions Met (OT) yes;meets criteria;skilled treatment is necessary  -AC --       Therapy Frequency (OT) 3 times/wk  -AC --       Predicted Duration of Therapy Intervention (OT) 10 days  -AC --       Planned Therapy Interventions (OT) adaptive equipment training;BADL retraining;functional balance retraining;occupation/activity based interventions;patient/caregiver education/training;strengthening exercise;transfer/mobility retraining  -AC --          OT Goals    Balance Goal Selection (OT) balance, OT goal 1  -AC --          Transfer Goal 1 (OT)    Activity/Assistive Device (Transfer Goal 1, OT) toilet  -AC --       [place] Level/Cues Needed (Transfer Goal 1, OT) modified independence  -AC --       Time Frame (Transfer Goal 1, OT) long term goal (LTG);10 days  -AC --       Progress/Outcome (Transfer Goal 1, OT) goal ongoing  -AC --          Dressing Goal 1 (OT)    Activity/Device (Dressing Goal 1, OT) dressing skills, all  -AC --       [place]/Cues Needed (Dressing Goal 1, OT) minimum assist (75% or more patient effort)  -AC --       Time Frame (Dressing Goal 1, OT) long term goal (LTG);10 days  -AC --       Strategies/Barriers (Dressing Goal 1, OT) with AE  -AC --       Progress/Outcome (Dressing Goal 1, OT) goal revised this date  -AC --          Toileting Goal 1 (OT)    Activity/Device (Toileting Goal 1, OT) toileting skills, all  -AC --       [place] Level/Cues Needed (Toileting Goal 1, OT) modified independence  -AC --       Time Frame (Toileting Goal 1, OT) long term goal (LTG);10 days  -AC --       Progress/Outcome (Toileting Goal 1, OT) goal not met  -AC --          Balance Goal 1 (OT)    Activity/Assistive Device (Balance Goal 1, OT) standing, dynamic  -AC --       [place] Level/Cues Needed (Balance Goal 1, OT) standby assist   -AC --       Time Frame (Balance Goal 1, OT) long term goal (LTG);10 days  -AC --       Progress/Outcomes (Balance Goal 1, OT) new goal  -AC --                 User Key  (r) = Recorded By, (t) = Taken By, (c) = Cosigned By      Initials Name Effective Dates    AC Yayo Arriaga, OTR/L, CNT 02/03/23 -     Ruby Jackson RN 06/16/21 -     Christiano Davison RN 02/17/22 -                      Occupational Therapy Education       Title: PT OT SLP Therapies (In Progress)       Topic: Occupational Therapy (In Progress)       Point: ADL training (Done)       Description:   Instruct learner(s) on proper safety adaptation and remediation techniques during self care or transfers.   Instruct in proper use of assistive devices.                  Learning Progress Summary             Patient Acceptance, E,TB, VU by AC at 8/14/2023 1343    Acceptance, E, VU by LS at 8/11/2023 1509                         Point: Home exercise program (Not Started)       Description:   Instruct learner(s) on appropriate technique for monitoring, assisting and/or progressing therapeutic exercises/activities.                  Learner Progress:  Not documented in this visit.              Point: Precautions (Done)       Description:   Instruct learner(s) on prescribed precautions during self-care and functional transfers.                  Learning Progress Summary             Patient Acceptance, E,TB, VU by AC at 8/14/2023 1343    Acceptance, E, VU by LS at 8/11/2023 1509                         Point: Body mechanics (Done)       Description:   Instruct learner(s) on proper positioning and spine alignment during self-care, functional mobility activities and/or exercises.                  Learning Progress Summary             Patient Acceptance, E,TB, VU by AC at 8/14/2023 1343    Acceptance, E, VU by LS at 8/11/2023 1509                                         User Key       Initials Effective Dates Name Provider Type Discipline     02/03/23  -  Yayo Arriaga, OTR/L, CNT Occupational Therapist OT    LS 06/20/22 -  Ilsa Rubio, OTR/L Occupational Therapist OT                      OT Recommendation and Plan  Planned Therapy Interventions (OT): adaptive equipment training, BADL retraining, functional balance retraining, occupation/activity based interventions, patient/caregiver education/training, strengthening exercise, transfer/mobility retraining  Therapy Frequency (OT): 3 times/wk  Plan of Care Review  Plan of Care Reviewed With: patient  Progress: no change  Outcome Evaluation: OT reeval completed.  Pt alert and oriented x4.  C/o 6/10 pain in lower back.  Came to EOB with SBA using log roll method and bed rails/HOB elevated.  Good sitting balance noted.  Pt donned LSO with set up but was dependent to don/doff socks due to pain and decreased L knee ROM.  Pt stood with CGA and ambulated in posey with walker.  Returned to supine in bed with SBA.  OT will continue to treat pt to address decreased ADL function.  Anticipate discharge home with assist.  Plan of Care Reviewed With: patient  Outcome Evaluation: OT reeval completed.  Pt alert and oriented x4.  C/o 6/10 pain in lower back.  Came to EOB with SBA using log roll method and bed rails/HOB elevated.  Good sitting balance noted.  Pt donned LSO with set up but was dependent to don/doff socks due to pain and decreased L knee ROM.  Pt stood with CGA and ambulated in posey with walker.  Returned to supine in bed with SBA.  OT will continue to treat pt to address decreased ADL function.  Anticipate discharge home with assist.     Outcome Measures       Row Name 08/14/23 0568             How much help from another is currently needed...    Putting on and taking off regular lower body clothing? 2  -AC      Bathing (including washing, rinsing, and drying) 2  -AC      Toileting (which includes using toilet bed pan or urinal) 3  -AC      Putting on and taking off regular upper body clothing 4  -AC      Taking  care of personal grooming (such as brushing teeth) 4  -AC      Eating meals 4  -AC      AM-PAC 6 Clicks Score (OT) 19  -AC         Functional Assessment    Outcome Measure Options AM-PAC 6 Clicks Daily Activity (OT)  -AC                User Key  (r) = Recorded By, (t) = Taken By, (c) = Cosigned By      Initials Name Provider Type    Yayo Mar, OTR/L, CRYSTAL Occupational Therapist                    Time Calculation:    Time Calculation- OT       Row Name 08/14/23 1343             Time Calculation- OT    OT Start Time 1306  -AC      OT Stop Time 1330  -AC      OT Time Calculation (min) 24 min  -      OT Received On 08/14/23  -      OT Goal Re-Cert Due Date 08/24/23  -                User Key  (r) = Recorded By, (t) = Taken By, (c) = Cosigned By      Initials Name Provider Type    Yayo Mar OTR/L, CRYSTAL Occupational Therapist                  Therapy Charges for Today       Code Description Service Date Service Provider Modifiers Qty    [id number]  OT RE-EVAL 2 8/14/2023 Yayo Arriaga OTR/L, CRYSTAL GO 1                 FATOU Pierre/L, CNT  8/14/2023

## 2023-08-14 NOTE — PLAN OF CARE
Goal Outcome Evaluation:  Plan of Care Reviewed With: patient        Progress: no change  Outcome Evaluation: OT reeval completed.  Pt alert and oriented x4.  C/o 6/10 pain in lower back.  Came to EOB with SBA using log roll method and bed rails/HOB elevated.  Good sitting balance noted.  Pt donned LSO with set up but was dependent to don/doff socks due to pain and decreased L knee ROM.  Pt stood with CGA and ambulated in posey with walker.  Returned to supine in bed with SBA.  OT will continue to treat pt to address decreased ADL function.  Anticipate discharge home with assist.

## 2023-08-14 NOTE — PLAN OF CARE
Goal Outcome Evaluation:  Plan of Care Reviewed With: patient           Outcome Evaluation: Pt arrived from PACU,  Dressing to lower back CDI.  3L NC (does not wear at home).  Pt has sleep apnea and O2 sats drop when sleeping.  Strong , flexion and extension, no complaints of numbness or tingling.  Brace when OOB.  Voiding per urinal.   Prn pain medication given x 1 for back pain w/ good results.  Family at bedside.   Call light within reach.

## 2023-08-15 ENCOUNTER — READMISSION MANAGEMENT (OUTPATIENT)
Dept: CALL CENTER | Facility: HOSPITAL | Age: 68
End: 2023-08-15
Payer: MEDICARE

## 2023-08-15 VITALS
TEMPERATURE: 98.6 F | HEART RATE: 76 BPM | WEIGHT: 262.35 LBS | DIASTOLIC BLOOD PRESSURE: 57 MMHG | SYSTOLIC BLOOD PRESSURE: 132 MMHG | OXYGEN SATURATION: 96 % | RESPIRATION RATE: 18 BRPM | HEIGHT: 73 IN | BODY MASS INDEX: 34.77 KG/M2

## 2023-08-15 LAB — GLUCOSE BLDC GLUCOMTR-MCNC: 121 MG/DL (ref 70–130)

## 2023-08-15 PROCEDURE — 82948 REAGENT STRIP/BLOOD GLUCOSE: CPT

## 2023-08-15 PROCEDURE — 25010000002 CEFAZOLIN PER 500 MG: Performed by: PHYSICIAN ASSISTANT

## 2023-08-15 RX ORDER — CYCLOBENZAPRINE HCL 10 MG
10 TABLET ORAL 3 TIMES DAILY PRN
Qty: 60 TABLET | Refills: 0 | Status: SHIPPED | OUTPATIENT
Start: 2023-08-15

## 2023-08-15 RX ORDER — NALOXONE HYDROCHLORIDE 4 MG/.1ML
SPRAY NASAL
Qty: 2 EACH | Refills: 0 | Status: SHIPPED | OUTPATIENT
Start: 2023-08-15

## 2023-08-15 RX ORDER — OXYCODONE AND ACETAMINOPHEN 7.5; 325 MG/1; MG/1
1 TABLET ORAL EVERY 4 HOURS PRN
Qty: 42 TABLET | Refills: 0 | Status: SHIPPED | OUTPATIENT
Start: 2023-08-15 | End: 2023-08-22

## 2023-08-15 RX ADMIN — EMPAGLIFLOZIN 25 MG: 25 TABLET, FILM COATED ORAL at 10:11

## 2023-08-15 RX ADMIN — HYDROCHLOROTHIAZIDE 12.5 MG: 25 TABLET ORAL at 10:10

## 2023-08-15 RX ADMIN — OXYCODONE AND ACETAMINOPHEN 2 TABLET: 7.5; 325 TABLET ORAL at 10:10

## 2023-08-15 RX ADMIN — LOSARTAN POTASSIUM 100 MG: 50 TABLET, FILM COATED ORAL at 10:11

## 2023-08-15 RX ADMIN — Medication 3 ML: at 10:11

## 2023-08-15 RX ADMIN — OXYCODONE AND ACETAMINOPHEN 2 TABLET: 7.5; 325 TABLET ORAL at 04:40

## 2023-08-15 RX ADMIN — PROPAFENONE HYDROCHLORIDE 300 MG: 150 TABLET, FILM COATED ORAL at 06:39

## 2023-08-15 RX ADMIN — DOCUSATE SODIUM 50 MG AND SENNOSIDES 8.6 MG 1 TABLET: 8.6; 5 TABLET, FILM COATED ORAL at 10:11

## 2023-08-15 RX ADMIN — ATORVASTATIN CALCIUM 40 MG: 40 TABLET, FILM COATED ORAL at 10:10

## 2023-08-15 RX ADMIN — POLYETHYLENE GLYCOL 3350 17 G: 17 POWDER, FOR SOLUTION ORAL at 10:10

## 2023-08-15 RX ADMIN — METFORMIN HCL 1000 MG: 500 TABLET ORAL at 10:10

## 2023-08-15 RX ADMIN — SODIUM CHLORIDE 1000 MG: 900 INJECTION INTRAVENOUS at 06:39

## 2023-08-15 NOTE — THERAPY DISCHARGE NOTE
Acute Care - Occupational Therapy Discharge Summary  [facility]     Patient Name: Bella Ordaz  : 1955  MRN: [id number]    Today's Date: 8/15/2023       Date of Referral to OT: 23         Admit Date: 2023        OT Recommendation and Plan    Visit Dx:    ICD-10-CM ICD-9-CM   1. Impaired mobility [Z74.09 (ICD-10-CM)]  Z74.09 799.89   2. Lumbar radiculopathy  M54.16 724.4                OT Rehab Goals       Row Name 08/15/23 1400             Transfer Goal 1 (OT)    Activity/Assistive Device (Transfer Goal 1, OT) toilet  -CS      [place] Level/Cues Needed (Transfer Goal 1, OT) modified independence  -CS      Time Frame (Transfer Goal 1, OT) long term goal (LTG);10 days  -CS      Progress/Outcome (Transfer Goal 1, OT) goal not met  -CS         Dressing Goal 1 (OT)    Activity/Device (Dressing Goal 1, OT) dressing skills, all  -CS      [place]/Cues Needed (Dressing Goal 1, OT) minimum assist (75% or more patient effort)  -CS      Time Frame (Dressing Goal 1, OT) long term goal (LTG);10 days  -CS      Strategies/Barriers (Dressing Goal 1, OT) with AE  -CS      Progress/Outcome (Dressing Goal 1, OT) goal not met  -CS         Toileting Goal 1 (OT)    Activity/Device (Toileting Goal 1, OT) toileting skills, all  -CS      [place] Level/Cues Needed (Toileting Goal 1, OT) modified independence  -CS      Time Frame (Toileting Goal 1, OT) long term goal (LTG);10 days  -CS      Progress/Outcome (Toileting Goal 1, OT) goal not met  -CS         Balance Goal 1 (OT)    Activity/Assistive Device (Balance Goal 1, OT) standing, dynamic  -CS      [place] Level/Cues Needed (Balance Goal 1, OT) standby assist  -CS      Time Frame (Balance Goal 1, OT) long term goal (LTG);10 days  -CS      Progress/Outcomes (Balance Goal 1, OT) goal not met  -CS                User Key  (r) = Recorded By, (t) = Taken By, (c) = Cosigned By      Initials Name Provider Type Discipline    CS Iesha Bashir, OTR/L, CNT  Occupational Therapist OT                     Outcome Measures       Row Name 08/14/23 0303             How much help from another is currently needed...    Putting on and taking off regular lower body clothing? 2  -AC      Bathing (including washing, rinsing, and drying) 2  -AC      Toileting (which includes using toilet bed pan or urinal) 3  -AC      Putting on and taking off regular upper body clothing 4  -AC      Taking care of personal grooming (such as brushing teeth) 4  -AC      Eating meals 4  -AC      AM-PAC 6 Clicks Score (OT) 19  -AC         Functional Assessment    Outcome Measure Options AM-PAC 6 Clicks Daily Activity (OT)  -AC                User Key  (r) = Recorded By, (t) = Taken By, (c) = Cosigned By      Initials Name Provider Type    Yayo Mar, OTR/L, CRYSTAL Occupational Therapist                            OT Discharge Summary  Anticipated Discharge Disposition (OT): home with assist  Reason for Discharge: Discharge from facility  Outcomes Achieved: Refer to plan of care for updates on goals achieved  Discharge Destination: Home with assist      Iesha Bashir OTR/L, CNT  8/15/2023

## 2023-08-15 NOTE — PROGRESS NOTES
Family Medicine Progress Note    Patient:  Bella Ordaz  YOB: 1955    MRN: [id number]     Acct: [id number]     Admit date: 8/11/2023    Patient Seen, Chart, Consults notes, Labs, Radiology studies reviewed.    Subjective: Day 4 of stay with degenerative lumbar spine disease with radiculopathy and most recent (in last 24 hours) has had no new issues overnight with continued control of his preadmission symptoms.  States he is being discharged today by admitting service.    Past, Family, Social History unchanged from admission.    Diet: Diet: Regular/House Diet; Texture: Regular Texture (IDDSI 7); Fluid Consistency: Thin (IDDSI 0)    Medications:  Scheduled Meds:atorvastatin, 40 mg, Oral, Daily  empagliflozin, 25 mg, Oral, Daily  losartan, 100 mg, Oral, Q24H   And  hydroCHLOROthiazide, 12.5 mg, Oral, Q24H  insulin lispro, 2-9 Units, Subcutaneous, 4x Daily AC & at Bedtime  metFORMIN, 1,000 mg, Oral, BID With Meals  polyethylene glycol, 17 g, Oral, BID  propafenone, 300 mg, Oral, Q8H  senna-docusate sodium, 1 tablet, Oral, BID  sodium chloride, 3 mL, Intravenous, Q12H      Continuous Infusions:lactated ringers, 100 mL/hr  lactated ringers, 9 mL/hr, Last Rate: 50 mL/hr (08/14/23 0700)  lactated ringers, 100 mL/hr, Last Rate: 100 mL/hr (08/14/23 2323)  sodium chloride, 100 mL/hr, Last Rate: 100 mL/hr (08/11/23 1424)  sodium chloride, 75 mL/hr, Last Rate: 75 mL/hr (08/14/23 1030)      PRN Meds:  acetaminophen    cyclobenzaprine    dextrose    dextrose    glucagon (human recombinant)    HYDROmorphone **AND** naloxone    lactated ringers    ondansetron **OR** ondansetron    oxyCODONE-acetaminophen    oxyCODONE-acetaminophen    sodium chloride    sodium chloride    Objective:    Lab Results (last 24 hours)       Procedure Component Value Units Date/Time    POC Glucose Once [[id number]]  (Normal) Collected: 08/15/23 0645    Specimen: Blood Updated: 08/15/23 0656     Glucose 121 mg/dL      Comment: :  [id number] Mihai CharidaMeter ID: [id number]       POC Glucose Once [[id number]]  (Abnormal) Collected: 08/14/23 2050    Specimen: Blood Updated: 08/14/23 2101     Glucose 140 mg/dL      Comment: : [id number] Mihai CharidaMeter ID: [id number]       POC Glucose Once [[id number]]  (Abnormal) Collected: 08/14/23 1647    Specimen: Blood Updated: 08/14/23 1658     Glucose 188 mg/dL      Comment: : [id number] Radha ChristinaMeter ID: [id number]       POC Glucose Once [[id number]]  (Normal) Collected: 08/14/23 1133    Specimen: Blood Updated: 08/14/23 1144     Glucose 110 mg/dL      Comment: : [id number] Radha ChristinaMeter ID: [id number]       POC Glucose Once [[id number]]  (Normal) Collected: 08/14/23 0824    Specimen: Blood Updated: 08/14/23 0836     Glucose 128 mg/dL      Comment: : [id number] Luis LoriMeter ID: [id number]                Imaging Results (Last 72 Hours)       Procedure Component Value Units Date/Time    XR Spine Lumbar AP & Lateral [[id number]] Collected: 08/14/23 0759     Updated: 08/14/23 0804    Narrative:      EXAMINATION: XR SPINE LUMBAR AP AND LATERAL- 8/14/2023 7:59 AM CDT     HISTORY: posterior fusion; Z74.09-Other reduced mobility.     Prostate time: 18.4 seconds.     Radiation dose: 3.6962 Gycm2 (Air Kerma).     Number fluoroscopic images recorded: 2.     REPORT: AP and lateral intraoperative fluoroscopic spot views at the  lumbosacral junction were obtained, under the supervision of the  orthopedic surgery service, no radiologist was present for image  acquisition. Images demonstrate anterior posterior instrumented fusion  at L5-S1, the hardware appears to be in satisfactory position. Images  are stored in PACS for review.  This report was finalized on 08/14/2023 08:00 by Dr. Rico Adams MD.    FL C Arm During Surgery [[id number]] Resulted: 08/14/23 0751     Updated: 08/14/23 0751    Narrative:      This procedure was auto-finalized with no dictation required.          "    Physical Exam:    Vitals: /51 (BP Location: Right arm, Patient Position: Lying)   Pulse 75   Temp 98.7 øF (37.1 øC) (Oral)   Resp 18   Ht 185 cm (72.84\")   Wt 119 kg (262 lb 5.6 oz)   SpO2 98%   BMI 34.77 kg/mý   24 hour intake/output:  Intake/Output Summary (Last 24 hours) at 8/15/2023 0736  Last data filed at 8/15/2023 0430  Gross per 24 hour   Intake 1240 ml   Output 2450 ml   Net -1210 ml     Last 3 weights:  Wt Readings from Last 3 Encounters:   08/11/23 119 kg (262 lb 5.6 oz)   08/10/23 122 kg (268 lb)   08/10/23 122 kg (268 lb 1.3 oz)       General Appearance alert, appears stated age, cooperative, and overweight  Head normocephalic, without obvious abnormality and atraumatic  Eyes lids and lashes normal, conjunctivae and sclerae normal, and no icterus  Neck supple and trachea midline  Lungs clear to auscultation, respirations regular, respirations even, and respirations unlabored  Heart regular rhythm & normal rate and normal S1, S2  Abdomen normal bowel sounds, soft non-tender, and no guarding  Extremities no edema and no cyanosis  Skin no bleeding, bruising or rash  Neurologic Mental Status orientated to person, place, time and situation         Assessment:      Lumbago of multiple sites in spine with sciatica    Primary hypertension    Paroxysmal atrial fibrillation    Type 2 diabetes mellitus without complication, without long-term current use of insulin    Mixed hyperlipidemia    Lumbosacral disc disease    Chronic bilateral low back pain with bilateral sciatica    Lumbar radiculopathy          Plan:  Agree with discharge home.  Has remained medically stable.  Will need to restart his home dose of Eliquis upon his discharge home.  I will plan to see him back in the office for follow-up.  Instructed to call with any medical concerns that may arise prior to that visit.      Electronically signed by Geovanny Olivarez MD on 8/15/2023 at 07:36 CDT            "

## 2023-08-15 NOTE — DISCHARGE SUMMARY
Date of Discharge:  8/15/2023    Admission Diagnosis: M54.16    Discharge Diagnosis:   1. Status post left L5-S1 hemilaminotomy, foraminotomy, decompression, 05/15/2013.   2. Mild chronic low back pain.  3. Recurrent left buttock, thigh and leg radiculopathy.  4. Neurogenic claudication.  5. Multilevel lumbar degenerative disc disease.   6. Multilevel lumbar facet arthropathy.  7. Small central disc herniation L2-3.   8. Mild spondylolisthesis, L4-5.   9. Central stenosis L2-3.   10. Severe foraminal stenosis, left L5-S1.  11.  Status post anterior decompression, ALIF with instrumentation L5-S1, 8/11/2023  12.  Status post PSF with instrumentation L5-S1, 8/14/2023     Consults During Admission: Dr. Louise    [facility]  Patient is a 68 y.o. male Known to our practice. Admitted for the above staged fusion.  This has been well tolerated and the patient will be discharged home today in good stable condition with instructions for brace when out of bed.  No driving until directed.  Patient will follow-up with Dr. Devi's clinic in two weeks. They will call if problems arise.       Condition on Discharge:  STABLE    Vital Signs  Temp:  [97.5 øF (36.4 øC)-98.8 øF (37.1 øC)] 98.7 øF (37.1 øC)  Heart Rate:  [67-77] 75  Resp:  [11-18] 18  BP: (105-135)/(51-71) 131/51    Physical Exam:   Alert and oriented x3, no acute distress, grossly neurovascularly intact, vital signs stable, dressing clean dry and intact, moving all extremities without focal deficit      Discharge Disposition      Discharge Medications     Discharge Medications        ASK your doctor about these medications        Instructions Start Date   apixaban 5 MG tablet tablet  Commonly known as: ELIQUIS   1 tablet, Oral, 2 Times Daily      atorvastatin 40 MG tablet  Commonly known as: LIPITOR   Take 1 tablet by mouth Daily.      empagliflozin 25 MG tablet tablet  Commonly known as: JARDIANCE   25 mg, Oral, Daily      metFORMIN 500 MG tablet  Commonly  known as: GLUCOPHAGE   Take 2 tablets by mouth 2 (Two) Times a Day With Meals.      olmesartan-hydrochlorothiazide 40-12.5 MG per tablet  Commonly known as: BENICAR HCT   1 tablet, Oral, Daily      oxyCODONE-acetaminophen 5-325 MG per tablet  Commonly known as: PERCOCET   1 tablet, Oral, Every 12 Hours PRN      propafenone 300 MG tablet  Commonly known as: RYTHMOL   Take 1 tablet by mouth Every 8 (Eight) Hours.               Discharge Diet: Resume Home diet, advance as tolerated    Activity at Discharge: Resume home activity, advance as tolerated, no lifting, no twisting, no bending, brace as directed, no driving until directed.     Follow-up Appointments  Followup with PCP within one week  [facility]up [facility] at 2 weeks post-op         Christopher Andre PA-C  08/15/23  07:32 CDT

## 2023-08-15 NOTE — DISCHARGE INSTR - ACTIVITY
Activity at Discharge: Resume home activity, advance as tolerated, no lifting, no twisting, no bending, brace as directed, no driving until directed.

## 2023-08-15 NOTE — OUTREACH NOTE
Prep Survey      Flowsheet Row Responses   [facility] discharged [facility]? [facility]   Is LACE score < 7 ? No   Eligibility Readm Mgmt   Discharge diagnosis tatus post left L5-S1 hemilaminotomy, foraminotomy, decompression,   Does the patient have one of the following disease processes/diagnoses(primary or secondary)? General Surgery   Does the patient have Home health ordered? No   Is there a DME ordered? No   Prep survey completed? Yes            JACQUES OCONNOR - Registered Nurse

## 2023-08-15 NOTE — PLAN OF CARE
Patient is A&O X4 and follows commands. UOP adequate and no BM this shift. Pain medication administered throughout the shift for back pain. VSS, afebrile and  patient safety maintained.   Problem: Adult Inpatient Plan of Care  Goal: Plan of Care Review  Outcome: Ongoing, Progressing  Goal: Patient-Specific Goal (Individualized)  Outcome: Ongoing, Progressing  Goal: Absence of Hospital-Acquired Illness or Injury  Outcome: Ongoing, Progressing  Intervention: Identify and Manage Fall Risk  Recent Flowsheet Documentation  Taken 8/15/2023 0510 by Ashley Holm RN  Safety Promotion/Fall Prevention: safety round/check completed  Taken 8/15/2023 0406 by Ashley Holm RN  Safety Promotion/Fall Prevention: safety round/check completed  Taken 8/15/2023 0306 by Ashley Holm RN  Safety Promotion/Fall Prevention: safety round/check completed  Taken 8/15/2023 0206 by Ashley Holm RN  Safety Promotion/Fall Prevention: safety round/check completed  Taken 8/15/2023 0106 by Ashley Holm RN  Safety Promotion/Fall Prevention: safety round/check completed  Taken 8/15/2023 0006 by Ashley Holm RN  Safety Promotion/Fall Prevention: safety round/check completed  Taken 8/14/2023 2306 by Ashley Holm RN  Safety Promotion/Fall Prevention: safety round/check completed  Taken 8/14/2023 2206 by Ashley Holm RN  Safety Promotion/Fall Prevention: safety round/check completed  Taken 8/14/2023 2106 by Ashley Holm RN  Safety Promotion/Fall Prevention: safety round/check completed  Taken 8/14/2023 2006 by Ashley Holm RN  Safety Promotion/Fall Prevention: safety round/check completed  Taken 8/14/2023 1925 by Ashley Holm RN  Safety Promotion/Fall Prevention: safety round/check completed  Intervention: Prevent Skin Injury  Recent Flowsheet Documentation  Taken 8/15/2023 0406 by Ashley Holm RN  Body Position:   position changed independently   weight shifting  Taken 8/15/2023 0206 by  Ashley Holm RN  Body Position:   position changed independently   supine  Taken 8/15/2023 0006 by Ashley Holm RN  Body Position:   position changed independently   weight shifting  Taken 8/14/2023 2206 by Ashley Holm RN  Body Position:   position changed independently   weight shifting   tilted  Taken 8/14/2023 2006 by Ashley Holm RN  Body Position:   position changed independently   tilted  Skin Protection:   adhesive use limited   incontinence pads utilized   tubing/devices free from skin contact   skin-to-skin areas padded   skin-to-device areas padded  Intervention: Prevent and Manage VTE (Venous Thromboembolism) Risk  Recent Flowsheet Documentation  Taken 8/15/2023 0406 by Ashley Holm RN  Activity Management: activity minimized  Taken 8/15/2023 0206 by Ashley Holm RN  Activity Management: activity minimized  Taken 8/15/2023 0006 by Ashley Holm RN  Activity Management: activity minimized  Taken 8/14/2023 2206 by Ashley Holm RN  Activity Management: activity minimized  Taken 8/14/2023 2006 by Ashley Holm RN  Activity Management: activity minimized  VTE Prevention/Management:   bilateral   sequential compression devices on  Range of Motion: ROM (range of motion) performed  Goal: Optimal Comfort and Wellbeing  Outcome: Ongoing, Progressing  Intervention: Monitor Pain and Promote Comfort  Recent Flowsheet Documentation  Taken 8/15/2023 0440 by Ashley Holm RN  Pain Management Interventions: see MAR  Taken 8/14/2023 2322 by Ashley Holm RN  Pain Management Interventions: see MAR  Taken 8/14/2023 1925 by Ashley Holm RN  Pain Management Interventions: see MAR  Intervention: Provide Person-Centered Care  Recent Flowsheet Documentation  Taken 8/14/2023 2006 by Ashley Holm RN  Trust Relationship/Rapport:   care explained   choices provided  Goal: Readiness for Transition of Care  Outcome: Ongoing, Progressing     Problem: Fall Injury  Risk  Goal: Absence of Fall and Fall-Related Injury  Outcome: Ongoing, Progressing  Intervention: Promote Injury-Free Environment  Recent Flowsheet Documentation  Taken 8/15/2023 0510 by Ashley Holm RN  Safety Promotion/Fall Prevention: safety round/check completed  Taken 8/15/2023 0406 by Ashley Holm RN  Safety Promotion/Fall Prevention: safety round/check completed  Taken 8/15/2023 0306 by Ashley Holm RN  Safety Promotion/Fall Prevention: safety round/check completed  Taken 8/15/2023 0206 by Ashley Holm RN  Safety Promotion/Fall Prevention: safety round/check completed  Taken 8/15/2023 0106 by Ashley Holm RN  Safety Promotion/Fall Prevention: safety round/check completed  Taken 8/15/2023 0006 by Ashley Holm RN  Safety Promotion/Fall Prevention: safety round/check completed  Taken 8/14/2023 2306 by Ashley Holm RN  Safety Promotion/Fall Prevention: safety round/check completed  Taken 8/14/2023 2206 by Ashley Holm RN  Safety Promotion/Fall Prevention: safety round/check completed  Taken 8/14/2023 2106 by Ashley Holm RN  Safety Promotion/Fall Prevention: safety round/check completed  Taken 8/14/2023 2006 by Ashley Holm RN  Safety Promotion/Fall Prevention: safety round/check completed  Taken 8/14/2023 1925 by Ashley Holm RN  Safety Promotion/Fall Prevention: safety round/check completed     Problem: Diabetes Comorbidity  Goal: Blood Glucose Level Within Targeted Range  Outcome: Ongoing, Progressing     Problem: Hypertension Comorbidity  Goal: Blood Pressure in Desired Range  Outcome: Ongoing, Progressing     Problem: Obstructive Sleep Apnea Risk or Actual Comorbidity Management  Goal: Unobstructed Breathing During Sleep  Outcome: Ongoing, Progressing     Problem: Pain Chronic (Persistent) (Comorbidity Management)  Goal: Acceptable Pain Control and Functional Ability  Outcome: Ongoing, Progressing  Intervention: Develop Pain Management  Plan  Recent Flowsheet Documentation  Taken 8/15/2023 0440 by Ashley Holm, RN  Pain Management Interventions: see MAR  Taken 8/14/2023 2322 by Ashley Holm, RN  Pain Management Interventions: see MAR  Taken 8/14/2023 1925 by Ashley Holm, RN  Pain Management Interventions: see MAR  Intervention: Optimize Psychosocial Wellbeing  Recent Flowsheet Documentation  Taken 8/14/2023 2006 by Ashley Hlom, RN  Diversional Activities: television  Family/Support System Care: support provided   Goal Outcome Evaluation:

## 2023-08-16 NOTE — THERAPY DISCHARGE NOTE
Acute Care - Physical Therapy Discharge Summary  [facility]       Patient Name: Bella Ordaz  : 1955  MRN: [id number]    Today's Date: 2023                 Admit Date: 2023      PT Recommendation and Plan    Visit Dx:    ICD-10-CM ICD-9-CM   1. Impaired mobility [Z74.09 (ICD-10-CM)]  Z74.09 799.89   2. Lumbar radiculopathy  M54.16 724.4        Outcome Measures       Row Name 23 1306             How much help from another is currently needed...    Putting on and taking off regular lower body clothing? 2  -AC      Bathing (including washing, rinsing, and drying) 2  -AC      Toileting (which includes using toilet bed pan or urinal) 3  -AC      Putting on and taking off regular upper body clothing 4  -AC      Taking care of personal grooming (such as brushing teeth) 4  -AC      Eating meals 4  -AC      AM-PAC 6 Clicks Score (OT) 19  -AC         Functional Assessment    Outcome Measure Options AM-PAC 6 Clicks Daily Activity (OT)  -AC                User Key  (r) = Recorded By, (t) = Taken By, (c) = Cosigned By      Initials Name Provider Type    AC Yayo Arriaga, OTR/L, CNT Occupational Therapist                         PT Rehab Goals       Row Name 23 1000             Bed Mobility Goal 1 (PT)    Activity/Assistive Device (Bed Mobility Goal 1, PT) bed mobility activities, all  -AB      [place] Level/Cues Needed (Bed Mobility Goal 1, PT) independent  -AB      Time Frame (Bed Mobility Goal 1, PT) long term goal (LTG);by discharge  -AB      Progress/Outcomes (Bed Mobility Goal 1, PT) goal not met  -AB         Transfer Goal 1 (PT)    Activity/Assistive Device (Transfer Goal 1, PT) sit-to-stand/stand-to-sit;bed-to-chair/chair-to-bed;walker, rolling  -AB      [place] Level/Cues Needed (Transfer Goal 1, PT) independent  -AB      Time Frame (Transfer Goal 1, PT) long term goal (LTG);by discharge  -AB      Progress/Outcome (Transfer Goal 1, PT) goal not met  -AB         Gait Training  Goal 1 (PT)    Activity/Assistive Device (Gait Training Goal 1, PT) gait (walking locomotion);assistive device use;decrease fall risk;diminish gait deviation;improve balance and speed;increase endurance/gait distance;walker, rolling  -AB      [place] Level (Gait Training Goal 1, PT) modified independence  -AB      Distance (Gait Training Goal 1, PT) 400ft  -AB      Time Frame (Gait Training Goal 1, PT) long term goal (LTG);by discharge  -AB      Progress/Outcome (Gait Training Goal 1, PT) goal not met  -AB                User Key  (r) = Recorded By, (t) = Taken By, (c) = Cosigned By      Initials Name Provider Type Discipline    Bella Chandler PTA Physical Therapist Assistant PT                        PT Discharge Summary  Anticipated Discharge Disposition (PT): home with assist  Reason for Discharge: Discharge from facility  Outcomes Achieved: Refer to plan of care for updates on goals achieved  Discharge Destination: Home with assist      Bella Torres PTA   8/16/2023

## 2023-08-17 ENCOUNTER — READMISSION MANAGEMENT (OUTPATIENT)
Dept: CALL CENTER | Facility: HOSPITAL | Age: 68
End: 2023-08-17
Payer: MEDICARE

## 2023-08-17 NOTE — OUTREACH NOTE
General Surgery Week 1 Survey      Flowsheet Row Responses   [facility] patient discharged from? [place]   Does the patient have one of the following disease processes/diagnoses(primary or secondary)? General Surgery   Week 1 attempt successful? Yes   Call start time 0827   Call end time 0830   Discharge diagnosis tatus post left L5-S1 hemilaminotomy, foraminotomy, decompression,   Meds reviewed with patient/caregiver? Yes   Is the patient having any side effects they believe may be caused by any medication additions or changes? No   Does the patient have all medications related to this admission filled (includes all antibiotics, pain medications, etc.) Yes   Is the patient taking all medications as directed (includes completed medication regime)? Yes   Does the patient have a follow up appointment scheduled with their surgeon? Yes  [8/30/23]   Has the patient kept scheduled appointments due by today? N/A   Comments PCP appt next week   Has home health visited the patient within 72 hours of discharge? N/A   Psychosocial issues? No   Did the patient receive a copy of their discharge instructions? Yes   Nursing interventions Reviewed instructions with patient   What is the patient's perception of their health status since discharge? Improving   Nursing interventions Nurse provided patient education   Is the patient /caregiver able to teach back basic post-op care? Take showers only when approved by MD-sponge bathe until then, No tub bath, swimming, or hot tub until instructed by MD, Lifting as instructed by MD in discharge instructions, Keep incision areas clean,dry and protected, Continue use of incentive spirometry at least 1 week post discharge   Is the patient/caregiver able to teach back steps to recovery at home? Set small, achievable goals for return to baseline health   Is the patient/caregiver able to teach back the hierarchy of who to call/visit for symptoms/problems? PCP, Specialist, Home health  nurse, Urgent Care, ED, 911 Yes   Week 1 call completed? Yes   Call end time 0830            Kita GAUTHIER - Registered Nurse

## 2024-02-06 ENCOUNTER — TRANSCRIBE ORDERS (OUTPATIENT)
Dept: ADMINISTRATIVE | Facility: HOSPITAL | Age: 69
End: 2024-02-06
Payer: MEDICARE

## 2024-02-06 DIAGNOSIS — M54.2 CERVICALGIA: Primary | ICD-10-CM

## 2024-10-02 ENCOUNTER — APPOINTMENT (OUTPATIENT)
Dept: CT IMAGING | Age: 69
End: 2024-10-02
Payer: MEDICARE

## 2024-10-02 ENCOUNTER — APPOINTMENT (OUTPATIENT)
Dept: GENERAL RADIOLOGY | Age: 69
End: 2024-10-02
Payer: MEDICARE

## 2024-10-02 ENCOUNTER — HOSPITAL ENCOUNTER (EMERGENCY)
Age: 69
End: 2024-10-02
Attending: EMERGENCY MEDICINE
Payer: MEDICARE

## 2024-10-02 VITALS
HEART RATE: 41 BPM | RESPIRATION RATE: 20 BRPM | DIASTOLIC BLOOD PRESSURE: 28 MMHG | SYSTOLIC BLOOD PRESSURE: 75 MMHG | BODY MASS INDEX: 33.38 KG/M2 | OXYGEN SATURATION: 93 % | WEIGHT: 260 LBS

## 2024-10-02 DIAGNOSIS — I46.9 CARDIAC ARREST: Primary | ICD-10-CM

## 2024-10-02 LAB
ALBUMIN SERPL-MCNC: 3.6 G/DL (ref 3.5–5.2)
ALLENS TEST: ABNORMAL
ALP SERPL-CCNC: 134 U/L (ref 40–129)
ALT SERPL-CCNC: 175 U/L (ref 5–41)
AMMONIA PLAS-SCNC: 102 UMOL/L (ref 16–60)
AMPHET UR QL SCN: NEGATIVE
ANION GAP SERPL CALCULATED.3IONS-SCNC: 28 MMOL/L (ref 7–19)
ANISOCYTOSIS BLD QL SMEAR: ABNORMAL
AST SERPL-CCNC: 225 U/L (ref 5–40)
BACTERIA #/AREA URNS HPF: ABNORMAL /HPF
BARBITURATES UR QL SCN: NEGATIVE
BASE EXCESS ARTERIAL: -17.1 MMOL/L (ref -2–2)
BASO STIPL BLD QL SMEAR: ABNORMAL
BASOPHILS # BLD: 0 K/UL (ref 0–0.2)
BASOPHILS NFR BLD: 0 % (ref 0–1)
BENZODIAZ UR QL SCN: NEGATIVE
BILIRUB SERPL-MCNC: 0.5 MG/DL (ref 0.2–1.2)
BILIRUB UR STRIP.AUTO-MCNC: NEGATIVE MG/DL
BUN SERPL-MCNC: 17 MG/DL (ref 8–23)
BUPRENORPHINE URINE: NEGATIVE
CALCIUM SERPL-MCNC: 9.7 MG/DL (ref 8.8–10.2)
CANNABINOIDS UR QL SCN: NEGATIVE
CARBOXYHEMOGLOBIN ARTERIAL: 0.9 % (ref 0–5)
CHARACTER UR: ABNORMAL
CHLORIDE SERPL-SCNC: 102 MMOL/L (ref 98–111)
CLARITY UR: ABNORMAL
CO2 SERPL-SCNC: 13 MMOL/L (ref 22–29)
COCAINE UR QL SCN: NEGATIVE
COLOR UR: YELLOW
CREAT SERPL-MCNC: 1.4 MG/DL (ref 0.7–1.2)
DRUG SCREEN COMMENT UR-IMP: NORMAL
EOSINOPHIL # BLD: 0.21 K/UL (ref 0–0.6)
EOSINOPHIL NFR BLD: 1 % (ref 0–5)
ERYTHROCYTE [DISTWIDTH] IN BLOOD BY AUTOMATED COUNT: 14.8 % (ref 11.5–14.5)
ETHANOLAMINE SERPL-MCNC: <10 MG/DL (ref 0–0.08)
FENTANYL SCREEN, URINE: NEGATIVE
GLUCOSE BLD-MCNC: 364 MG/DL (ref 70–99)
GLUCOSE SERPL-MCNC: 312 MG/DL (ref 70–99)
GLUCOSE UR STRIP.AUTO-MCNC: >=1000 MG/DL
HCO3 ARTERIAL: 13.7 MMOL/L (ref 22–26)
HCT VFR BLD AUTO: 49.1 % (ref 42–52)
HEMOGLOBIN, ART, EXTENDED: 11.9 G/DL (ref 14–18)
HGB BLD-MCNC: 13 G/DL (ref 14–18)
HGB UR STRIP.AUTO-MCNC: ABNORMAL MG/L
IMM GRANULOCYTES # BLD: 1.1 K/UL
KETONES UR STRIP.AUTO-MCNC: NEGATIVE MG/DL
LEUKOCYTE ESTERASE UR QL STRIP.AUTO: NEGATIVE
LYMPHOCYTES # BLD: 12.7 K/UL (ref 1.1–4.5)
LYMPHOCYTES NFR BLD: 62 % (ref 20–40)
MACROCYTES BLD QL SMEAR: ABNORMAL
MCH RBC QN AUTO: 27.3 PG (ref 27–31)
MCHC RBC AUTO-ENTMCNC: 26.5 G/DL (ref 33–37)
MCV RBC AUTO: 103.2 FL (ref 80–94)
METAMYELOCYTES NFR BLD MANUAL: 1 %
METHADONE UR QL SCN: NEGATIVE
METHAMPHETAMINE, URINE: NEGATIVE
METHEMOGLOBIN ARTERIAL: 0 %
MONOCYTES # BLD: 0.6 K/UL (ref 0–0.9)
MONOCYTES NFR BLD: 3 % (ref 0–10)
NEUTROPHILS # BLD: 7 K/UL (ref 1.5–7.5)
NEUTS BAND NFR BLD MANUAL: 1 % (ref 0–5)
NEUTS SEG NFR BLD: 32 % (ref 50–65)
NITRITE UR QL STRIP.AUTO: NEGATIVE
O2 CONTENT ARTERIAL: 16.1 ML/DL
O2 SAT, ARTERIAL: 95.4 %
O2 THERAPY: ABNORMAL
OPIATES UR QL SCN: NEGATIVE
OXYCODONE UR QL SCN: NEGATIVE
PCO2 ARTERIAL: 53 MMHG (ref 35–45)
PCP UR QL SCN: NEGATIVE
PERFORMED ON: ABNORMAL
PH ARTERIAL: 7.02 (ref 7.35–7.45)
PH UR STRIP.AUTO: 6 [PH] (ref 5–8)
PLATELET # BLD AUTO: 172 K/UL (ref 130–400)
PLATELET SLIDE REVIEW: ADEQUATE
PMV BLD AUTO: 12.6 FL (ref 9.4–12.4)
PO2 ARTERIAL: 104 MMHG (ref 80–100)
POLYCHROMASIA BLD QL SMEAR: ABNORMAL
POTASSIUM BLD-SCNC: 5.1 MMOL/L
POTASSIUM SERPL-SCNC: 4.1 MMOL/L (ref 3.5–5)
PROT SERPL-MCNC: 6.2 G/DL (ref 6.4–8.3)
PROT UR STRIP.AUTO-MCNC: NEGATIVE MG/DL
RBC # BLD AUTO: 4.76 M/UL (ref 4.7–6.1)
RBC #/AREA URNS HPF: ABNORMAL /HPF (ref 0–2)
SAMPLE SOURCE: ABNORMAL
SODIUM SERPL-SCNC: 143 MMOL/L (ref 136–145)
SP GR UR STRIP.AUTO: <=1.005 (ref 1–1.03)
SQUAMOUS #/AREA URNS HPF: ABNORMAL /HPF
TRICYCLIC ANTIDEPRESSANTS, UR: NEGATIVE
TROPONIN, HIGH SENSITIVITY: 21 NG/L (ref 0–22)
URN SPEC COLLECT METH UR: ABNORMAL
UROBILINOGEN UR STRIP.AUTO-MCNC: 0.2 E.U./DL
WBC # BLD AUTO: 20.5 K/UL (ref 4.8–10.8)
WBC #/AREA URNS HPF: ABNORMAL /HPF (ref 0–5)

## 2024-10-02 PROCEDURE — 84484 ASSAY OF TROPONIN QUANT: CPT

## 2024-10-02 PROCEDURE — 31500 INSERT EMERGENCY AIRWAY: CPT

## 2024-10-02 PROCEDURE — 81001 URINALYSIS AUTO W/SCOPE: CPT

## 2024-10-02 PROCEDURE — 6360000002 HC RX W HCPCS: Performed by: EMERGENCY MEDICINE

## 2024-10-02 PROCEDURE — 82140 ASSAY OF AMMONIA: CPT

## 2024-10-02 PROCEDURE — 70450 CT HEAD/BRAIN W/O DYE: CPT

## 2024-10-02 PROCEDURE — 87040 BLOOD CULTURE FOR BACTERIA: CPT

## 2024-10-02 PROCEDURE — 82803 BLOOD GASES ANY COMBINATION: CPT

## 2024-10-02 PROCEDURE — 2500000003 HC RX 250 WO HCPCS: Performed by: EMERGENCY MEDICINE

## 2024-10-02 PROCEDURE — 80053 COMPREHEN METABOLIC PANEL: CPT

## 2024-10-02 PROCEDURE — 71045 X-RAY EXAM CHEST 1 VIEW: CPT

## 2024-10-02 PROCEDURE — 82077 ASSAY SPEC XCP UR&BREATH IA: CPT

## 2024-10-02 PROCEDURE — 85025 COMPLETE CBC W/AUTO DIFF WBC: CPT

## 2024-10-02 PROCEDURE — 92950 HEART/LUNG RESUSCITATION CPR: CPT

## 2024-10-02 PROCEDURE — 2580000003 HC RX 258: Performed by: EMERGENCY MEDICINE

## 2024-10-02 PROCEDURE — 36600 WITHDRAWAL OF ARTERIAL BLOOD: CPT

## 2024-10-02 PROCEDURE — 6360000002 HC RX W HCPCS

## 2024-10-02 PROCEDURE — 82962 GLUCOSE BLOOD TEST: CPT

## 2024-10-02 PROCEDURE — 99285 EMERGENCY DEPT VISIT HI MDM: CPT

## 2024-10-02 PROCEDURE — 80307 DRUG TEST PRSMV CHEM ANLYZR: CPT

## 2024-10-02 PROCEDURE — 36415 COLL VENOUS BLD VENIPUNCTURE: CPT

## 2024-10-02 PROCEDURE — G0480 DRUG TEST DEF 1-7 CLASSES: HCPCS

## 2024-10-02 RX ORDER — EPINEPHRINE IN SOD CHLOR,ISO 1 MG/10 ML
SYRINGE (ML) INTRAVENOUS DAILY PRN
Status: COMPLETED | OUTPATIENT
Start: 2024-10-02 | End: 2024-10-02

## 2024-10-02 RX ORDER — DOBUTAMINE HYDROCHLORIDE 200 MG/100ML
10 INJECTION INTRAVENOUS CONTINUOUS
Status: DISCONTINUED | OUTPATIENT
Start: 2024-10-02 | End: 2024-10-02

## 2024-10-02 RX ORDER — DOBUTAMINE HYDROCHLORIDE 200 MG/100ML
INJECTION INTRAVENOUS
Status: DISCONTINUED
Start: 2024-10-02 | End: 2024-10-03 | Stop reason: HOSPADM

## 2024-10-02 RX ORDER — AMIODARONE HYDROCHLORIDE 150 MG/3ML
INJECTION, SOLUTION INTRAVENOUS DAILY PRN
Status: COMPLETED | OUTPATIENT
Start: 2024-10-02 | End: 2024-10-02

## 2024-10-02 RX ORDER — CALCIUM CHLORIDE 100 MG/ML
INJECTION INTRAVENOUS; INTRAVENTRICULAR DAILY PRN
Status: COMPLETED | OUTPATIENT
Start: 2024-10-02 | End: 2024-10-02

## 2024-10-02 RX ADMIN — Medication 1 MG: at 16:33

## 2024-10-02 RX ADMIN — CALCIUM CHLORIDE 1000 MG: 100 INJECTION, SOLUTION INTRAVENOUS at 15:52

## 2024-10-02 RX ADMIN — Medication 1 MG: at 16:30

## 2024-10-02 RX ADMIN — SODIUM BICARBONATE 50 MEQ: 84 INJECTION, SOLUTION INTRAVENOUS at 16:29

## 2024-10-02 RX ADMIN — Medication 1 MG: at 15:47

## 2024-10-02 RX ADMIN — Medication 1 MG: at 15:33

## 2024-10-02 RX ADMIN — EPINEPHRINE 10 MCG/MIN: 1 INJECTION INTRAMUSCULAR; INTRAVENOUS; SUBCUTANEOUS at 15:59

## 2024-10-02 RX ADMIN — Medication 1 MG: at 16:27

## 2024-10-02 RX ADMIN — Medication 1 MG: at 15:53

## 2024-10-02 RX ADMIN — Medication 1 MG: at 15:44

## 2024-10-02 RX ADMIN — Medication 1 MG: at 16:07

## 2024-10-02 RX ADMIN — Medication 1 MG: at 15:30

## 2024-10-02 RX ADMIN — AMIODARONE HYDROCHLORIDE 300 MG: 50 INJECTION, SOLUTION INTRAVENOUS at 15:43

## 2024-10-02 RX ADMIN — SODIUM BICARBONATE 50 MEQ: 84 INJECTION, SOLUTION INTRAVENOUS at 15:46

## 2024-10-02 RX ADMIN — Medication 1 MG: at 16:39

## 2024-10-02 RX ADMIN — Medication 1 MG: at 16:12

## 2024-10-02 RX ADMIN — Medication 1 MG: at 16:36

## 2024-10-02 NOTE — PROGRESS NOTES
Pt arrived to er intubated with 7.5 et tube at the 20 cm bernardo. Pt placed on vent settings ACVC 18, , 5 peep and 100% Fio2

## 2024-10-02 NOTE — ED PROVIDER NOTES
stayed in [facility] and code was called at family request.    CONSULTS:  None    PROCEDURES:  Unless otherwise notedbelow, none     Intubation    Date/Time: 10/2/2024 8:24 PM    Performed by: Sharon Pink MD  Authorized by: Sharon Pink MD    Consent:     Consent obtained:  Emergent situation  Universal protocol:     Patient identity confirmed:  Anonymous protocol, patient vented/unresponsive  Pre-procedure details:     Indications: cardio/pulmonary arrest      Patient status:  Unresponsive    Look externally: no concerns      Pharmacologic strategy: none      Induction agents:  None    Paralytics:  None  Procedure details:     Preoxygenation:  Bag valve mask    CPR in progress: yes      Number of attempts:  1  Successful intubation attempt details:     Intubation method:  Oral    Intubation technique: video assisted      Laryngoscope blade:  Mac 4    Tube size (mm):  7.5    Tube type:  Cuffed    Tube visualized through cords: yes    Placement assessment:     ETT at teeth/gumline (cm):  19    Tube secured with:  ETT thompson    Breath sounds:  Equal    Placement verification: chest rise, colorimetric ETCO2, CXR verification, direct visualization and equal breath sounds      CXR findings:  Appropriate position      FINAL IMPRESSION     1. Cardiac arrest          DISPOSITION/PLAN   DISPOSITION  10/02/2024 04:54:02 PM  Condition at Disposition: Data Unavailable      PATIENT REFERRED TO:  @FUP@    DISCHARGE MEDICATIONS:  New Prescriptions    No medications on file        CRITICAL CARE TIME   Total Critical Care time was 65 minutes, excluding separately reportable procedures. Time includes direct patient care, reassessing patient, documenting care, and coordinating care for the patient. Time also includes data interpretation of any lab tests or imaging that were performed. There was a high probability of clinically significant/life threatening deterioration in the patient's condition which required my urgent

## 2024-10-03 LAB
BACTERIA BLD CULT ORG #2: NORMAL
BACTERIA BLD CULT: NORMAL

## 2024-10-07 LAB
BACTERIA BLD CULT ORG #2: NORMAL
BACTERIA BLD CULT: NORMAL
EKG P AXIS: 47 DEGREES
EKG P AXIS: 50 DEGREES
EKG P AXIS: 60 DEGREES
EKG P-R INTERVAL: 144 MS
EKG P-R INTERVAL: 184 MS
EKG P-R INTERVAL: 254 MS
EKG Q-T INTERVAL: 334 MS
EKG Q-T INTERVAL: 370 MS
EKG Q-T INTERVAL: 424 MS
EKG QRS DURATION: 160 MS
EKG QRS DURATION: 166 MS
EKG QRS DURATION: 170 MS
EKG QTC CALCULATION (BAZETT): 439 MS
EKG QTC CALCULATION (BAZETT): 452 MS
EKG QTC CALCULATION (BAZETT): 470 MS
EKG T AXIS: -18 DEGREES
EKG T AXIS: 38 DEGREES
EKG T AXIS: 52 DEGREES

## (undated) DEVICE — SOLUTION IRRIG 3000ML 0.9% SOD CHL USP UROMATIC PLAS CONT

## (undated) DEVICE — 3M™ STERI-DRAPE™ INSTRUMENT POUCH 1018: Brand: STERI-DRAPE™

## (undated) DEVICE — INSTRUMENT KIT ORTHOPEDIC KNEE NAVITRACK

## (undated) DEVICE — ELECTRD BLD EZ CLN MOD XLNG 2.75IN

## (undated) DEVICE — ZIMMER® STERILE DISPOSABLE TOURNIQUET CUFF WITH PLC, DUAL PORT, SINGLE BLADDER, 34 IN. (86 CM)

## (undated) DEVICE — GLV SURG SENSICARE W/ALOE PF LF 7.5 STRL

## (undated) DEVICE — UNDERGLOVE SURG SZ 8 FNGR THK0.21MIL GRN LTX BEAD CUF

## (undated) DEVICE — ROSA RBTC UNT 20 DROP

## (undated) DEVICE — SHEET,DRAPE,53X77,STERILE: Brand: MEDLINE

## (undated) DEVICE — GLV SURG BIOGEL LTX PF 6 1/2

## (undated) DEVICE — GLOVE SURG SZ 85 L12IN FNGR THK79MIL GRN LTX FREE

## (undated) DEVICE — SURGICAL PROCEDURE PACK KNEE TOT DBD CDS LOURDES HOSP LF

## (undated) DEVICE — PIN FIX L80MM DIA3.2MM STRL FLUT CAS

## (undated) DEVICE — SEALANT TISS 10 CC FIBRIN VISTASEAL

## (undated) DEVICE — NDL TP BVL JAMSHIDI ACC GUIDE ARCUS

## (undated) DEVICE — SOLUTION IV IRRIG POUR BRL 0.9% SODIUM CHL 2F7124

## (undated) DEVICE — PAD,NON-ADHERENT,3X8,STERILE,LF,1/PK: Brand: MEDLINE

## (undated) DEVICE — TRAP FLD MINIVAC MEGADYNE 100ML

## (undated) DEVICE — ADHESIVE SKIN CLOSURE WND 8.661X1.5 IN 22 CM LIQUIBAND SECUR

## (undated) DEVICE — NEPTUNE E-SEP SMOKE EVACUATION PENCIL, COATED, 70MM BLADE, PUSH BUTTON SWITCH: Brand: NEPTUNE E-SEP

## (undated) DEVICE — SUTURE VCRL SZ 2-0 L36IN ABSRB UD L36MM CT-1 1/2 CIR J945H

## (undated) DEVICE — DRAPE,UTILITY,TAPE,15X26,STERILE: Brand: MEDLINE

## (undated) DEVICE — 4-PORT MANIFOLD: Brand: NEPTUNE 2

## (undated) DEVICE — CVR UNIV C/ARM

## (undated) DEVICE — GLV SURG GRN DERMASSURE LF PF 7.5

## (undated) DEVICE — ROYAL SILK SURGICAL GOWN, XXL: Brand: CONVERTORS

## (undated) DEVICE — DUAL CUT SAGITTAL BLADE

## (undated) DEVICE — DRP C/ARMOR

## (undated) DEVICE — PK SPINE POST 30

## (undated) DEVICE — DRESSING FOAM ABSORBENT 8X4 IN BORDER SELF ADH MEPILEX

## (undated) DEVICE — SUTURE VCRL SZ 1 L18IN ABSRB UD L36MM CT-1 1/2 CIR J841D

## (undated) DEVICE — PIN FIX L150MM DIA3.2MM FLUT CAS

## (undated) DEVICE — GLOVE SURG SZ 8 L12IN FNGR THK79MIL GRN LTX FREE

## (undated) DEVICE — CHLORAPREP 26ML ORANGE

## (undated) DEVICE — TROCAR TIP NITINOL GUIDEWIRE 18": Brand: INVICTUS

## (undated) DEVICE — SPNG GZ WOVN 4X4IN 12PLY 10/BX STRL

## (undated) DEVICE — YANKAUER SUCTION INSTRUMENT WITHOUT CONTROL VENT, OPEN TIP, CLEAR: Brand: YANKAUER

## (undated) DEVICE — GLOVE SURG SZ 75 CRM LTX FREE POLYISOPRENE POLYMER BEAD ANTI

## (undated) DEVICE — APPL CHLORAPREP HI/LITE 26ML ORNG

## (undated) DEVICE — GLOVE SURG SZ 85 CRM LTX FREE POLYISOPRENE POLYMER BEAD ANTI

## (undated) DEVICE — SUTURE VCRL SZ 2-0 L27IN ABSRB UD L26MM SH 1/2 CIR J417H

## (undated) DEVICE — BAPTIST TURNOVER KIT: Brand: MEDLINE INDUSTRIES, INC.

## (undated) DEVICE — GLV SURG BIOGEL LTX PF 7 1/2

## (undated) DEVICE — GOWN,PREVENTION PLUS,XL,ST,24/CS: Brand: MEDLINE

## (undated) DEVICE — FAN SPRAY KIT: Brand: PULSAVAC®

## (undated) DEVICE — SPK10277 JACKSON/PRO-AXIS KIT: Brand: SPK10277 JACKSON/PRO-AXIS KIT

## (undated) DEVICE — GLV SURG DERMASSURE GRN LF PF 8.0